# Patient Record
Sex: FEMALE | Race: WHITE | NOT HISPANIC OR LATINO | Employment: FULL TIME | URBAN - METROPOLITAN AREA
[De-identification: names, ages, dates, MRNs, and addresses within clinical notes are randomized per-mention and may not be internally consistent; named-entity substitution may affect disease eponyms.]

---

## 2017-04-12 ENCOUNTER — OFFICE VISIT (OUTPATIENT)
Dept: FAMILY MEDICINE | Facility: CLINIC | Age: 62
End: 2017-04-12
Payer: COMMERCIAL

## 2017-04-12 VITALS
OXYGEN SATURATION: 100 % | TEMPERATURE: 98 F | HEART RATE: 60 BPM | HEIGHT: 65 IN | SYSTOLIC BLOOD PRESSURE: 128 MMHG | WEIGHT: 154 LBS | DIASTOLIC BLOOD PRESSURE: 76 MMHG | BODY MASS INDEX: 25.66 KG/M2

## 2017-04-12 DIAGNOSIS — Z71.1 CONCERN ABOUT CARDIOVASCULAR DISEASE WITHOUT DIAGNOSIS: Primary | ICD-10-CM

## 2017-04-12 PROCEDURE — 99212 OFFICE O/P EST SF 10 MIN: CPT | Performed by: NURSE PRACTITIONER

## 2017-04-12 RX ORDER — CHOLECALCIFEROL (VITAMIN D3) 50 MCG
2000 TABLET ORAL DAILY
COMMUNITY
End: 2017-12-20

## 2017-04-12 NOTE — PROGRESS NOTES
HPI    SUBJECTIVE:                                                    Neli Gregorio is a 61 year old female who presents to clinic today for the following health issues:      Chief Complaint   Patient presents with     Hypertension     blood pressure at last OBGYN visit was 132/86, wanting to make sure this does not need corrective action       BP slightly elevated around 126/80 when [facility] checks [facility]  She is concerned as she has always been in the 1-teens.   She has had a couple fleeting episodes of a hazy brain feeling but those have not been severe at all   No other symptoms     Past Medical History:   Diagnosis Date     Abdominal pain      Atrophic vaginitis      Decreased libido 2015    trial of testosterone cream. Not interested in IM.     Hypercholesteraemia 2010     Hyperlipidemia     PATIENT STARTED LOVASTATIN 12/09     Insomnia, unspecified     Takes 1/4 tab of Ambien 5mg nightly.      Proctitis      Psoriasis     Dx originally by derm, mild disease. TCM prn.     Past Surgical History:   Procedure Laterality Date     MAMMOPLASTY AUGMENTATION  2007    Breast lift     Social History   Substance Use Topics     Smoking status: Former Smoker     Quit date: 1/1/1985     Smokeless tobacco: Never Used     Alcohol use 0.0 oz/week     0 Standard drinks or equivalent per week      Comment: 1/day     Current Outpatient Prescriptions   Medication Sig Dispense Refill     Cholecalciferol (VITAMIN D) 2000 UNITS tablet Take 2,000 Units by mouth daily       atorvastatin (LIPITOR) 40 MG tablet Take 1 tablet (40 mg) by mouth daily 90 tablet 3     desonide (DESOWEN) 0.05 % cream Apply sparingly to vulva area two times daily for 14 days. 15 g 1     EPINEPHrine (EPIPEN) 0.3 MG/0.3ML injection Inject 0.3 mLs (0.3 mg) into the muscle once as needed for anaphylaxis       Allergies   Allergen Reactions     Cats Other (See Comments)     scratchy throat         Reviewed PMH, med list and allergies.      ROS  Detailed as above  "      /76 (BP Location: Right arm, Patient Position: Chair, Cuff Size: Adult Regular)  Pulse 60  Temp 98  F (36.7  C) (Oral)  Ht 5' 5\" (1.651 m)  Wt 154 lb (69.9 kg)  SpO2 100%  Breastfeeding? No  BMI 25.63 kg/m2    Physical Exam   Constitutional: She is well-developed, well-nourished, and in no distress.   Neurological: She is alert.   Psychiatric: Mood and affect normal.   Vitals reviewed.      Assessment and Plan:       ICD-10-CM    1. Concern about cardiovascular disease without diagnosis Z71.1        Concern about BP recently that has been minimally elevated   Normal BP today  Recommended she continue with healthy diet and exercise   Continue following up appropriately     The total visit time was 10 minutes more  than 50% was spent in counseling and coordination of care as discussed above.      LETICIA Meier, CNP  [facility]        "

## 2017-04-12 NOTE — MR AVS SNAPSHOT
"              After Visit Summary   4/12/2017    Neli Gregorio    MRN: [id number]           Patient Information     Date Of Birth          1955        Visit Information        Provider Department      4/12/2017 8:00 AM Nilesh Mckeon APRN CNP [facility]        Today's Diagnoses     Concern about cardiovascular disease without diagnosis    -  1       Follow-ups after your visit        Who to contact     If you have questions or need follow up information about today's clinic visit or your schedule please contact [facility] directly at [phone].  Normal or non-critical lab and imaging results will be communicated to you by W[employer] Labshart, letter or phone within 4 business days after the clinic has received the results. If you do not hear from us within 7 days, please contact the clinic through [employer] or phone. If you have a critical or abnormal lab result, we will notify you by phone as soon as possible.  Submit refill requests through SEDLine or call your pharmacy and they will forward the refill request to us. Please allow 3 business days for your refill to be completed.          Additional Information About Your Visit        MyChart Information     SEDLine gives you secure access to your electronic health record. If you see a primary care provider, you can also send messages to your care team and make appointments. If you have questions, please call your primary care clinic.  If you do not have a primary care provider, please call [phone] and they will assist you.        Care EveryWhere ID     This is your Care EveryWhere ID. This could be used by other organizations to access your [place] medical records  [id number]        Your Vitals Were     Pulse Temperature Height Pulse Oximetry Breastfeeding? BMI (Body Mass Index)    60 98  F (36.7  C) (Oral) 5' 5\" (1.651 m) 100% No 25.63 kg/m2       Blood Pressure from Last 3 Encounters:   04/12/17 128/76   12/14/16 132/86 "   02/17/16 118/70    Weight from Last 3 Encounters:   04/12/17 154 lb (69.9 kg)   12/14/16 155 lb (70.3 kg)   02/17/16 148 lb (67.1 kg)              Today, you had the following     No orders found for display         Today's Medication Changes          These changes are accurate as of: 4/12/17  8:36 AM.  If you have any questions, ask your nurse or doctor.               Stop taking these medicines if you haven't already. Please contact your care team if you have questions.     calcium carb 1250 mg (500 mg [place])/vitamin D 200 units 500-200 MG-UNIT per tablet   Commonly known as:  OSCAL with D   Stopped by:  Nilesh Mckeon APRN CNP                    Primary Care Provider Office Phone # Fax #    Guru Bradley Mccord [phone]       [facility] [address]  [facility] [address]        Thank you!     Thank you for choosing [facility]  for your care. Our goal is always to provide you with excellent care. Hearing back from our patients is one way we can continue to improve our services. Please take a few minutes to complete the written survey that you may receive in the mail after your visit with us. Thank you!             Your Updated Medication List - Protect others around you: Learn how to safely use, store and throw away your medicines at www.disposemymeds.org.          This list is accurate as of: 4/12/17  8:36 AM.  Always use your most recent med list.                   Brand Name Dispense Instructions for use    atorvastatin 40 MG tablet    LIPITOR    90 tablet    Take 1 tablet (40 mg) by mouth daily       desonide 0.05 % cream    DESOWEN    15 g    Apply sparingly to vulva area two times daily for 14 days.       EPINEPHrine 0.3 MG/0.3ML injection      Inject 0.3 mLs (0.3 mg) into the muscle once as needed for anaphylaxis       vitamin D 2000 UNITS tablet      Take 2,000 Units by mouth daily

## 2017-04-12 NOTE — NURSING NOTE
"Chief Complaint   Patient presents with     Hypertension     blood pressure at last OBGYN visit was 132/86, wanting to make sure this does not need corrective action       Initial /76 (BP Location: Right arm, Patient Position: Chair, Cuff Size: Adult Regular)  Pulse 60  Temp 98  F (36.7  C) (Oral)  Ht 5' 5\" (1.651 m)  Wt 154 lb (69.9 kg)  SpO2 100%  Breastfeeding? No  BMI 25.63 kg/m2 Estimated body mass index is 25.63 kg/(m^2) as calculated from the following:    Height as of this encounter: 5' 5\" (1.651 m).    Weight as of this encounter: 154 lb (69.9 kg).  Medication Reconciliation: complete     Wolfgang Mcnally CMA     "

## 2017-04-13 DIAGNOSIS — R23.8 SKIN IRRITATION: ICD-10-CM

## 2017-04-13 RX ORDER — DESONIDE 0.5 MG/G
CREAM TOPICAL
Qty: 15 G | Refills: 1 | Status: SHIPPED | OUTPATIENT
Start: 2017-04-13 | End: 2017-12-20

## 2017-04-13 NOTE — TELEPHONE ENCOUNTER
desonide (DESOWEN) 0.05 % cream   Last Written Prescription Date:  5/3/16  Last Fill Quantity: 15 gm,   # refills: 1  Last Office Visit with [facility] primary care provider:  12/14/16  Future Office visit: none    Routing refill request to provider for review/approval because:  Refill sent per [place] Protocol.

## 2017-12-20 ENCOUNTER — RADIANT APPOINTMENT (OUTPATIENT)
Dept: MAMMOGRAPHY | Facility: CLINIC | Age: 62
End: 2017-12-20
Payer: COMMERCIAL

## 2017-12-20 ENCOUNTER — OFFICE VISIT (OUTPATIENT)
Dept: OBGYN | Facility: CLINIC | Age: 62
End: 2017-12-20
Payer: COMMERCIAL

## 2017-12-20 VITALS
SYSTOLIC BLOOD PRESSURE: 108 MMHG | WEIGHT: 147 LBS | BODY MASS INDEX: 24.49 KG/M2 | HEIGHT: 65 IN | DIASTOLIC BLOOD PRESSURE: 66 MMHG | HEART RATE: 68 BPM

## 2017-12-20 DIAGNOSIS — E78.5 HYPERLIPIDEMIA WITH TARGET LDL LESS THAN 100: ICD-10-CM

## 2017-12-20 DIAGNOSIS — E78.00 HYPERCHOLESTEROLEMIA: ICD-10-CM

## 2017-12-20 DIAGNOSIS — Z13.21 ENCOUNTER FOR VITAMIN DEFICIENCY SCREENING: ICD-10-CM

## 2017-12-20 DIAGNOSIS — Z12.31 VISIT FOR SCREENING MAMMOGRAM: ICD-10-CM

## 2017-12-20 DIAGNOSIS — R23.8 SKIN IRRITATION: ICD-10-CM

## 2017-12-20 DIAGNOSIS — Z01.419 ENCOUNTER FOR GYNECOLOGICAL EXAMINATION WITHOUT ABNORMAL FINDING: Primary | ICD-10-CM

## 2017-12-20 PROCEDURE — 82306 VITAMIN D 25 HYDROXY: CPT | Performed by: OBSTETRICS & GYNECOLOGY

## 2017-12-20 PROCEDURE — 99396 PREV VISIT EST AGE 40-64: CPT | Performed by: OBSTETRICS & GYNECOLOGY

## 2017-12-20 PROCEDURE — 80061 LIPID PANEL: CPT | Performed by: OBSTETRICS & GYNECOLOGY

## 2017-12-20 PROCEDURE — 80053 COMPREHEN METABOLIC PANEL: CPT | Performed by: OBSTETRICS & GYNECOLOGY

## 2017-12-20 PROCEDURE — 36415 COLL VENOUS BLD VENIPUNCTURE: CPT | Performed by: OBSTETRICS & GYNECOLOGY

## 2017-12-20 PROCEDURE — G0202 SCR MAMMO BI INCL CAD: HCPCS | Mod: TC

## 2017-12-20 RX ORDER — ATORVASTATIN CALCIUM 40 MG/1
40 TABLET, FILM COATED ORAL DAILY
Qty: 90 TABLET | Refills: 3 | Status: SHIPPED | OUTPATIENT
Start: 2017-12-20 | End: 2019-02-20

## 2017-12-20 RX ORDER — DESONIDE 0.5 MG/G
CREAM TOPICAL
Qty: 15 G | Refills: 3 | Status: SHIPPED | OUTPATIENT
Start: 2017-12-20 | End: 2019-07-19

## 2017-12-20 ASSESSMENT — ANXIETY QUESTIONNAIRES
1. FEELING NERVOUS, ANXIOUS, OR ON EDGE: NOT AT ALL
2. NOT BEING ABLE TO STOP OR CONTROL WORRYING: NOT AT ALL
6. BECOMING EASILY ANNOYED OR IRRITABLE: NOT AT ALL
IF YOU CHECKED OFF ANY PROBLEMS ON THIS QUESTIONNAIRE, HOW DIFFICULT HAVE THESE PROBLEMS MADE IT FOR YOU TO DO YOUR WORK, TAKE CARE OF THINGS AT HOME, OR GET ALONG WITH OTHER PEOPLE: NOT DIFFICULT AT ALL
3. WORRYING TOO MUCH ABOUT DIFFERENT THINGS: NOT AT ALL
GAD7 TOTAL SCORE: 1
7. FEELING AFRAID AS IF SOMETHING AWFUL MIGHT HAPPEN: NOT AT ALL
5. BEING SO RESTLESS THAT IT IS HARD TO SIT STILL: NOT AT ALL

## 2017-12-20 ASSESSMENT — PATIENT HEALTH QUESTIONNAIRE - PHQ9
5. POOR APPETITE OR OVEREATING: SEVERAL DAYS
SUM OF ALL RESPONSES TO PHQ QUESTIONS 1-9: 0

## 2017-12-20 NOTE — MR AVS SNAPSHOT
"              After Visit Summary   12/20/2017    Neli Gregorio    MRN: [id number]           Patient Information     Date Of Birth          1955        Visit Information        Provider Department      12/20/2017 8:20 AM Nuvia Riley MD [facility]        Today's Diagnoses     Encounter for gynecological examination without abnormal finding    -  1    Hypercholesterolemia        Skin irritation        Hyperlipidemia with target LDL less than 100        Encounter for vitamin deficiency screening           Follow-ups after your visit        Who to contact     If you have questions or need follow up information about today's clinic visit or your schedule please contact [facility] directly at [phone].  Normal or non-critical lab and imaging results will be communicated to you by Mirror Digitalhart, letter or phone within 4 business days after the clinic has received the results. If you do not hear from us within 7 days, please contact the clinic through Mirror Digitalhart or phone. If you have a critical or abnormal lab result, we will notify you by phone as soon as possible.  Submit refill requests through AdsNative or call your pharmacy and they will forward the refill request to us. Please allow 3 business days for your refill to be completed.          Additional Information About Your Visit        MyChart Information     A[employer] gives you secure access to your electronic health record. If you see a primary care provider, you can also send messages to your care team and make appointments. If you have questions, please call your primary care clinic.  If you do not have a primary care provider, please call [phone] and they will assist you.        Care EveryWhere ID     This is your Care EveryWhere ID. This could be used by other organizations to access your [place] medical records  [id number]        Your Vitals Were     Pulse Height BMI (Body Mass Index)             68 5' 5\" " (1.651 m) 24.46 kg/m2          Blood Pressure from Last 3 Encounters:   12/20/17 108/66   04/12/17 128/76   12/14/16 132/86    Weight from Last 3 Encounters:   12/20/17 147 lb (66.7 kg)   04/12/17 154 lb (69.9 kg)   12/14/16 155 lb (70.3 kg)              We Performed the Following     Comprehensive metabolic panel     Lipid panel reflex to direct LDL Fasting     Vitamin D Deficiency          Where to get your medicines      These medications were sent to [id number] - [place], [place] - [address]  [address], [place] [address]     Phone:  [phone]     atorvastatin 40 MG tablet    desonide 0.05 % cream          Primary Care Provider Office Phone # Fax #    Guru Mccord [phone] [address] [facility] [address]  [facility] [address]        Equal Access to Services     BISMARK CATES AH: Hadii star ku hadasho Soomaali, waaxda luqadaha, qaybta kaalmada adeegyada, med cross . So [facility] [phone].    ATENCIÓN: Si samirala español, tiene a bo disposición servicios gratuitos de asistencia lingüística. Llame al [phone].    We comply with applicable federal civil rights laws and [place] laws. We do not discriminate on the basis of race, color, national origin, age, disability, sex, sexual orientation, or gender identity.            Thank you!     Thank you for choosing [facility] [place] [place]  for your care. Our goal is always to provide you with excellent care. Hearing back from our patients is one way we can continue to improve our services. Please take a few minutes to complete the written survey that you may receive in the mail after your visit with us. Thank you!             Your Updated Medication List - Protect others around you: Learn how to safely use, store and throw away your medicines at www.disposemymeds.org.          This list is accurate as of: 12/20/17  9:13 AM.  Always use your most recent med list.                    Brand Name Dispense Instructions for use Diagnosis    atorvastatin 40 MG tablet    LIPITOR    90 tablet    Take 1 tablet (40 mg) by mouth daily    Hypercholesterolemia       desonide 0.05 % cream    DESOWEN    15 g    Apply sparingly to vulva area two times daily for 14 days.    Skin irritation       EPINEPHrine 0.3 MG/0.3ML injection 2-pack    EPIPEN/ADRENACLICK/or ANY BX GENERIC EQUIV     Inject 0.3 mLs (0.3 mg) into the muscle once as needed for anaphylaxis

## 2017-12-20 NOTE — PROGRESS NOTES
Neli is a 62 year old  female who presents for annual exam.     Besides routine health maintenance, she has no other health concerns today .    HPI:  The patient's PCP is Guru Mccord MD.    Prior breast reduction  Some rashes in creases occ  Wants labs today  Not taking vit D      GYNECOLOGIC HISTORY:    No LMP recorded. Patient is postmenopausal.  Her current contraception method is: menopause.  She  reports that she quit smoking about 32 years ago. She has never used smokeless tobacco.      Patient is sexually active.  STD testing offered?  Declined  Last PHQ-9 score on record = PHQ-9 SCORE 2017   Total Score 0     Last GAD7 score on record =   GRIFFIN-7 SCORE 2017   Total Score 1     Alcohol Score = 5    HEALTH MAINTENANCE:  Cholesterol: 16   Total= 179, Triglycerides=65, HDL=65, ASM=717, FBS=84  Last Mammo: one year ago, Result: normal, Next Mammo: today   Pap: 01/15/13 wnl, HPV-  Colonoscopy:  07/15/08, Result: normal, Next Colonoscopy: 1 year.  Dexa:  13 normal    Health maintenance updated:  yes    HISTORY:  Obstetric History       T1      L1     SAB0   TAB1   Ectopic0   Multiple0   Live Births1       # Outcome Date GA Lbr Delonte/2nd Weight Sex Delivery Anes PTL Lv   2 Term 85 39w0d  7 lb 6 oz (3.345 kg) M Vag-Vacuum   MAX   1 TAB                   Patient Active Problem List   Diagnosis     Hyperlipidemia with target LDL less than 100     Past Surgical History:   Procedure Laterality Date     MAMMOPLASTY AUGMENTATION  2007    Breast lift      Social History   Substance Use Topics     Smoking status: Former Smoker     Quit date: 1985     Smokeless tobacco: Never Used     Alcohol use 0.0 oz/week     0 Standard drinks or equivalent per week      Comment: 1/day      Problem (# of Occurrences) Relation (Name,Age of Onset)    Alzheimer Disease (1) Mother    HEART DISEASE (1) Father    Hyperlipidemia (2) Father, Mother    Hypertension (1) Father    OSTEOPOROSIS  "(2) Other: aunt, Maternal Grandmother            Current Outpatient Prescriptions   Medication Sig     desonide (DESOWEN) 0.05 % cream Apply sparingly to vulva area two times daily for 14 days.     atorvastatin (LIPITOR) 40 MG tablet Take 1 tablet (40 mg) by mouth daily     EPINEPHrine (EPIPEN) 0.3 MG/0.3ML injection Inject 0.3 mLs (0.3 mg) into the muscle once as needed for anaphylaxis     No current facility-administered medications for this visit.      Allergies   Allergen Reactions     Cats Other (See Comments)     scratchy throat         Past medical, surgical, social and family histories were reviewed and updated in EPIC.    ROS:   12 point review of systems negative other than symptoms noted below.  Breast: Tenderness  Respiratory: Cough  Genitourinary: No Periods and Vaginal Dryness  Skin: Rash  Endocrine: Decreased Libido    EXAM:  /66  Pulse 68  Ht 5' 5\" (1.651 m)  Wt 147 lb (66.7 kg)  BMI 24.46 kg/m2   BMI: Body mass index is 24.46 kg/(m^2).    PHYSICAL EXAM:  Constitutional:  Appearance: Well nourished, well developed, alert, in no acute distress  Neck:  Lymph Nodes:  No lymphadenopathy present    Thyroid:  Gland size normal, nontender, no nodules or masses present  on palpation  Chest:  Respiratory Effort:  Breathing unlabored  Cardiovascular:    Heart: Auscultation:  Regular rate, normal rhythm, no murmurs present  Breasts: Inspection of Breasts:  No lymphadenopathy present., Palpation of Breasts and Axillae:  No masses present on palpation, no breast tenderness., Axillary Lymph Nodes:  No lymphadenopathy present. and No nodularity, asymmetry or nipple discharge bilaterally.  Gastrointestinal:   Abdominal Examination:  Abdomen nontender to palpation, tone normal without rigidity or guarding, no masses present, umbilicus without lesions   Liver and Spleen:  No hepatomegaly present, liver nontender to palpation    Hernias:  No hernias present  Lymphatic: Lymph Nodes:  No other lymphadenopathy " present  Skin:  General Inspection:  No rashes present, no lesions present, no areas of  discoloration    Genitalia and Groin:  No rashes present, no lesions present, no areas of  discoloration, no masses present  Neurologic/Psychiatric:    Mental Status:  Oriented X3     Pelvic Exam:  External Genitalia:     Normal appearance for age, no discharge present, no tenderness present, no inflammatory lesions present, color normal  Vagina:     Normal vaginal vault without central or paravaginal defects, no discharge present, no inflammatory lesions present, no masses present  Bladder:     Nontender to palpation  Urethra:   Urethral Body:  Urethra palpation normal, urethra structural support normal   Urethral Meatus:  No erythema or lesions present  Cervix:     Appearance healthy, no lesions present, nontender to palpation, no bleeding present  Uterus:     Uterus: firm, normal sized and nontender, anteverted in position.   Adnexa:     No adnexal tenderness present, no adnexal masses present  Perineum:     Perineum within normal limits, no evidence of trauma, no rashes or skin lesions present  Anus:     Anus within normal limits, no hemorrhoids present  Inguinal Lymph Nodes:     No lymphadenopathy present  Pubic Hair:     Normal pubic hair distribution for age  Genitalia and Groin:     No rashes present, no lesions present, no areas of discoloration, no masses present      COUNSELING:   Reviewed preventive health counseling, as reflected in patient instructions       Regular exercise       Healthy diet/nutrition    BMI: Body mass index is 24.46 kg/(m^2).        ASSESSMENT:  62 year old female with satisfactory annual exam.  ICD-10-CM    1. Encounter for gynecological examination without abnormal finding Z01.419        PLAN:  Return 1 yr    Nuvia Riley MD

## 2017-12-21 LAB
ALBUMIN SERPL-MCNC: 4.2 G/DL (ref 3.4–5)
ALP SERPL-CCNC: 53 U/L (ref 40–150)
ALT SERPL W P-5'-P-CCNC: 26 U/L (ref 0–50)
ANION GAP SERPL CALCULATED.3IONS-SCNC: 6 MMOL/L (ref 3–14)
AST SERPL W P-5'-P-CCNC: 18 U/L (ref 0–45)
BILIRUB SERPL-MCNC: 0.6 MG/DL (ref 0.2–1.3)
BUN SERPL-MCNC: 17 MG/DL (ref 7–30)
CALCIUM SERPL-MCNC: 8.9 MG/DL (ref 8.5–10.1)
CHLORIDE SERPL-SCNC: 105 MMOL/L (ref 94–109)
CHOLEST SERPL-MCNC: 172 MG/DL
CO2 SERPL-SCNC: 28 MMOL/L (ref 20–32)
CREAT SERPL-MCNC: 0.64 MG/DL (ref 0.52–1.04)
DEPRECATED CALCIDIOL+CALCIFEROL SERPL-MC: 28 UG/L (ref 20–75)
GFR SERPL CREATININE-BSD FRML MDRD: >90 ML/MIN/1.7M2
GLUCOSE SERPL-MCNC: 81 MG/DL (ref 70–99)
HDLC SERPL-MCNC: 76 MG/DL
LDLC SERPL CALC-MCNC: 86 MG/DL
NONHDLC SERPL-MCNC: 96 MG/DL
POTASSIUM SERPL-SCNC: 3.9 MMOL/L (ref 3.4–5.3)
PROT SERPL-MCNC: 6.7 G/DL (ref 6.8–8.8)
SODIUM SERPL-SCNC: 139 MMOL/L (ref 133–144)
TRIGL SERPL-MCNC: 49 MG/DL

## 2017-12-21 ASSESSMENT — ANXIETY QUESTIONNAIRES: GAD7 TOTAL SCORE: 1

## 2017-12-22 ENCOUNTER — MYC MEDICAL ADVICE (OUTPATIENT)
Dept: OBGYN | Facility: CLINIC | Age: 62
End: 2017-12-22

## 2017-12-23 NOTE — TELEPHONE ENCOUNTER
The lipids are entirely normal.  We don't treat for total cholesterol numbers.  The normal value for total cholesterol is < 200 so to answer your question, 172 is completely normal, not extremely high as you were thinking.   The normal ranges show on the report you can see in My chart.    The LDL is way less than 100 which is excellent and well below goal.  That is the most important number to follow.    There is no need to adjust the medication dose.

## 2018-03-06 ENCOUNTER — OFFICE VISIT (OUTPATIENT)
Dept: FAMILY MEDICINE | Facility: CLINIC | Age: 63
End: 2018-03-06
Payer: COMMERCIAL

## 2018-03-06 VITALS
SYSTOLIC BLOOD PRESSURE: 110 MMHG | WEIGHT: 149 LBS | HEIGHT: 65 IN | BODY MASS INDEX: 24.83 KG/M2 | TEMPERATURE: 97.1 F | OXYGEN SATURATION: 98 % | DIASTOLIC BLOOD PRESSURE: 75 MMHG | HEART RATE: 70 BPM

## 2018-03-06 DIAGNOSIS — M25.511 RIGHT SHOULDER PAIN, UNSPECIFIED CHRONICITY: ICD-10-CM

## 2018-03-06 DIAGNOSIS — M54.6 BILATERAL THORACIC BACK PAIN, UNSPECIFIED CHRONICITY: Primary | ICD-10-CM

## 2018-03-06 PROCEDURE — 99213 OFFICE O/P EST LOW 20 MIN: CPT | Performed by: NURSE PRACTITIONER

## 2018-03-06 NOTE — MR AVS SNAPSHOT
After Visit Summary   3/6/2018    Neli Gregorio    MRN: [id number]           Patient Information     Date Of Birth          1955        Visit Information        Provider Department      3/6/2018 4:00 PM Nilesh Mckeon APRN [facility]        Today's Diagnoses     Bilateral thoracic back pain, unspecified chronicity    -  1    Right shoulder pain, unspecified chronicity          Care Instructions    Take Tylenol, Ibuprofen or Aleve for pain.   Ibuprofen and Aleve are both antiinflammatories so you should never take these together during the same 24 hour period.  If you take a high dose of Ibuprofen, do not take it consistently for more than 5 days   Tylenol only helps with pain and does not help with inflammation. Tylenol is the safest option if you have kidney or heart history.  You have to take at least 600mg of ibuprofen to get the antiinflammatory affect. 200-400mg of Ibuprofen will only help with pain.  It is ok to take Tylenol with Ibuprofen or Aleve  Do not take more than:  Tylenol (acetaminophen)  1000 mg 3 times a day  Ibuprofen (Advil/Motrin) 600-800 mg 3-4 times a day WITH FOOD  Aleve 220mg 2 pills twice a day WITH FOOD            Follow-ups after your visit        Additional Services     [facility] PT, HAND, AND CHIROPRACTIC REFERRAL       **This order will print in the [facility] Scheduling Office**    Physical Therapy, Hand Therapy and Chiropractic Care are available through:    *[facility]  *[facility]  *[facility]    Call one number to schedule at any of the above locations: [phone].    Your provider has referred you to: [facility] - PT and/or Chiropractic Care determined by clinical presentation at [facility] or [facility] Initial Visit    Indication/Reason for Referral: Shoulder Pain and Thoracic Pain  Onset of Illness: 6 weeks  Therapy Orders: Evaluate and Treat  Special Programs: None  Special Request:  "None    Hanna Ferreira      Additional Comments for the Therapist or Chiropractor:     Please be aware that coverage of these services is subject to the terms and limitations of your health insurance plan.  Call member services at your health plan with any benefit or coverage questions.      Please bring the following to your appointment:    *Your personal calendar for scheduling future appointments  *Comfortable clothing                  Who to contact     If you have questions or need follow up information about today's clinic visit or your schedule please contact [facility] directly at [phone].  Normal or non-critical lab and imaging results will be communicated to you by Sidensehart, letter or phone within 4 business days after the clinic has received the results. If you do not hear from us within 7 days, please contact the clinic through [employer] or phone. If you have a critical or abnormal lab result, we will notify you by phone as soon as possible.  Submit refill requests through [email or web address] or call your pharmacy and they will forward the refill request to us. Please allow 3 business days for your refill to be completed.          Additional Information About Your Visit        Sidensehart Information     [email or web address] gives you secure access to your electronic health record. If you see a primary care provider, you can also send messages to your care team and make appointments. If you have questions, please call your primary care clinic.  If you do not have a primary care provider, please call [phone] and they will assist you.        Care EveryWhere ID     This is your Care EveryWhere ID. This could be used by other organizations to access your [facility] medical records  [id number]        Your Vitals Were     Pulse Temperature Height Pulse Oximetry Breastfeeding? BMI (Body Mass Index)    70 97.1  F (36.2  C) (Oral) 5' 5\" (1.651 m) 98% No 24.79 kg/m2       Blood Pressure from Last 3 Encounters:   03/06/18 " 110/75   12/20/17 108/66   04/12/17 128/76    Weight from Last 3 Encounters:   03/06/18 149 lb (67.6 kg)   12/20/17 147 lb (66.7 kg)   04/12/17 154 lb (69.9 kg)              We Performed the Following     [facility] PT, HAND, AND CHIROPRACTIC REFERRAL          Today's Medication Changes          These changes are accurate as of 3/6/18  4:31 PM.  If you have any questions, ask your nurse or doctor.               These medicines have changed or have updated prescriptions.        Dose/Directions    atorvastatin 40 MG tablet   Commonly known as:  LIPITOR   This may have changed:  how much to take   Used for:  Hypercholesterolemia        Dose:  40 mg   Take 1 tablet (40 mg) by mouth daily   Quantity:  90 tablet   Refills:  3                Primary Care Provider Office Phone # Fax #    Guru Mccord [phone] [address] [address]        Equal Access to Services     ELOINA CATES AH: Hadii star ku hadasho Soomaali, waaxda luqadaha, qaybta kaalmada adeegyada, waxay kandis haygarcia cross . So [facility] [phone].    ATENCIÓN: Si habla español, tiene a bo disposición servicios gratuitos de asistencia lingüística. Llame al [phone].    We comply with applicable federal civil rights laws and [place] laws. We do not discriminate on the basis of race, color, national origin, age, disability, sex, sexual orientation, or gender identity.            Thank you!     Thank you for choosing [facility]  for your care. Our goal is always to provide you with excellent care. Hearing back from our patients is one way we can continue to improve our services. Please take a few minutes to complete the written survey that you may receive in the mail after your visit with us. Thank you!             Your Updated Medication List - Protect others around you: Learn how to safely use, store and throw away your medicines at www.disposemymeds.org.          This list is accurate as of  3/6/18  4:31 PM.  Always use your most recent med list.                   Brand Name Dispense Instructions for use Diagnosis    atorvastatin 40 MG tablet    LIPITOR    90 tablet    Take 1 tablet (40 mg) by mouth daily    Hypercholesterolemia       desonide 0.05 % cream    DESOWEN    15 g    Apply sparingly to vulva area two times daily for 14 days.    Skin irritation       EPINEPHrine 0.3 MG/0.3ML injection 2-pack    EPIPEN/ADRENACLICK/or ANY BX GENERIC EQUIV     Inject 0.3 mLs (0.3 mg) into the muscle once as needed for anaphylaxis

## 2018-03-06 NOTE — PROGRESS NOTES
HPI      SUBJECTIVE:   Neli Gregorio is a 62 year old female who presents to clinic today for the following health issues:    Chief Complaint   Patient presents with     Back Pain     mid to right side back pain 6 weeks & right shoulder nagging pain. No known injury, no cough, no rash        Mid back pain off and on for about 6 week. More on right than left    Also right shoulder pain   In [place] last week. Diarrhea 1 day that is resolved   Had a cold at the end of December and again at the end of Jan but never had a terrible cough   No SOB  Always has pain of right breast but other no CP   Some morning the pain is worse, but nothing specific brings on the pain   Advil resolves the pain but doesn't want to take the daily   Certain positions do help and also massage helps   No paresthesias   Shoulder pain can go a little into the deltoid   Minimal chronic neck pain       Past Medical History:   Diagnosis Date     Abdominal pain      Atrophic vaginitis      Decreased libido 2015    trial of testosterone cream. Not interested in IM.     Hypercholesteraemia 2010     Hyperlipidemia     PATIENT STARTED LOVASTATIN 12/09     Insomnia, unspecified     Takes 1/4 tab of Ambien 5mg nightly.      Proctitis      Psoriasis     Dx originally by derm, mild disease. TCM prn.     Past Surgical History:   Procedure Laterality Date     MAMMOPLASTY AUGMENTATION  2007    Breast lift     Social History   Substance Use Topics     Smoking status: Former Smoker     Quit date: 1/1/1985     Smokeless tobacco: Never Used     Alcohol use 0.0 oz/week     0 Standard drinks or equivalent per week      Comment: 1/day     Current Outpatient Prescriptions   Medication Sig Dispense Refill     atorvastatin (LIPITOR) 40 MG tablet Take 1 tablet (40 mg) by mouth daily (Patient taking differently: Take 20 mg by mouth daily ) 90 tablet 3     desonide (DESOWEN) 0.05 % cream Apply sparingly to vulva area two times daily for 14 days. 15 g 3     EPINEPHrine  "(EPIPEN) 0.3 MG/0.3ML injection Inject 0.3 mLs (0.3 mg) into the muscle once as needed for anaphylaxis       Allergies   Allergen Reactions     Cats Other (See Comments)     scratchy throat         Reviewed and updated as needed this visit by clinical staff and provider    ROS  Detailed as above       /75 (BP Location: Right arm, Cuff Size: Adult Regular)  Pulse 70  Temp 97.1  F (36.2  C) (Oral)  Ht 5' 5\" (1.651 m)  Wt 149 lb (67.6 kg)  SpO2 98%  Breastfeeding? No  BMI 24.79 kg/m2      Physical Exam   Constitutional: She is well-developed, well-nourished, and in no distress.   HENT:   Head: Normocephalic.   Eyes: Conjunctivae are normal.   Neck: Normal range of motion.   Cardiovascular: Normal rate, regular rhythm and normal heart sounds.    No murmur heard.  Pulmonary/Chest: Effort normal and breath sounds normal.   Musculoskeletal: Normal range of motion. She exhibits no tenderness.   Notable asymmetry of bilateral upper arm musculature as well as bilateral upper back.   Neurological: She is alert.   Psychiatric: Mood and affect normal.   Vitals reviewed.        Assessment and Plan:       ICD-10-CM    1. Bilateral thoracic back pain, unspecified chronicity M54.6 [facility] PT, HAND, AND CHIROPRACTIC REFERRAL   2. Right shoulder pain, unspecified chronicity M25.511 [facility] PT, HAND, AND CHIROPRACTIC REFERRAL       Persistent upper back pain with notable asymmetry of musculature.  Will send to PT for eval and treatment as this is clearly muscular.  She can take Tylenol or ibuprofen as needed for pain.  Call or return to clinic as needed      LETICIA Meier, CNP  [facility]    "

## 2018-03-06 NOTE — NURSING NOTE
"Chief Complaint   Patient presents with     Back Pain     mid to right side back pain 6 weeks & right shoulder nagging pain. No known injury, no cough, no rash        Initial /75 (BP Location: Right arm, Cuff Size: Adult Regular)  Pulse 70  Temp 97.1  F (36.2  C) (Oral)  Ht 5' 5\" (1.651 m)  Wt 149 lb (67.6 kg)  SpO2 98%  Breastfeeding? No  BMI 24.79 kg/m2 Estimated body mass index is 24.79 kg/(m^2) as calculated from the following:    Height as of this encounter: 5' 5\" (1.651 m).    Weight as of this encounter: 149 lb (67.6 kg).  Medication Reconciliation: complete    "

## 2018-03-06 NOTE — PATIENT INSTRUCTIONS
Take Tylenol, Ibuprofen or Aleve for pain.   Ibuprofen and Aleve are both antiinflammatories so you should never take these together during the same 24 hour period.  If you take a high dose of Ibuprofen, do not take it consistently for more than 5 days   Tylenol only helps with pain and does not help with inflammation. Tylenol is the safest option if you have kidney or heart history.  You have to take at least 600mg of ibuprofen to get the antiinflammatory affect. 200-400mg of Ibuprofen will only help with pain.  It is ok to take Tylenol with Ibuprofen or Aleve  Do not take more than:  Tylenol (acetaminophen)  1000 mg 3 times a day  Ibuprofen (Advil/Motrin) 600-800 mg 3-4 times a day WITH FOOD  Aleve 220mg 2 pills twice a day WITH FOOD

## 2018-03-17 ENCOUNTER — HEALTH MAINTENANCE LETTER (OUTPATIENT)
Age: 63
End: 2018-03-17

## 2018-03-21 ENCOUNTER — THERAPY VISIT (OUTPATIENT)
Dept: PHYSICAL THERAPY | Facility: CLINIC | Age: 63
End: 2018-03-21
Payer: COMMERCIAL

## 2018-03-21 DIAGNOSIS — M75.41 IMPINGEMENT SYNDROME OF SHOULDER REGION, RIGHT: Primary | ICD-10-CM

## 2018-03-21 PROCEDURE — 97112 NEUROMUSCULAR REEDUCATION: CPT | Mod: GP

## 2018-03-21 PROCEDURE — 97161 PT EVAL LOW COMPLEX 20 MIN: CPT | Mod: GP

## 2018-03-21 PROCEDURE — 97110 THERAPEUTIC EXERCISES: CPT | Mod: GP

## 2018-03-21 NOTE — PROGRESS NOTES
[facility] Initial Evaluation  Subjective:  Patient is a 62 year old female presenting with rehab right shoulder hpi.   Neli Grgeorio is a 62 year old female with a right shoulder condition.  Condition occurred with:  Unknown cause.  Condition occurred: for unknown reasons.  This is a new condition  Onset: 4-6 weeks ago, thinks it might be due to lifting at gym; .    Patient reports pain:  In the joint.    Pain is described as sharp and is intermittent and reported as 5/10.   Pain is the same all the time.  Symptoms are exacerbated by lying on extremity and certain positions Relieved by: avoidance   Pain course: a little worse since onset.                                                      Objective:  Standing Alignment:      Shoulder/UE:  Rounded shoulders                                       Shoulder Evaluation:  ROM:  AROM:    Flexion:  Right:  160    Abduction:  Right:  150    Internal Rotation:  Right:  35  External Rotation:  Right:  95                      Strength:  : R RTC: grossly 4+/5 all motions, painful with scaption, ER.                        Special Tests:      Right shoulder positive for the following special tests:Impingement  Right shoulder negative for the following special tests:Rotator cuff tear                                       General     ROS    Assessment/Plan:    Patient is a 62 year old female with right side shoulder complaints.    Patient has the following significant findings with corresponding treatment plan.                Diagnosis 1:  R shoulder pain, s&sx consistent with impingement   Decreased strength - therapeutic exercise and therapeutic activities  Impaired muscle performance - neuro re-education  Decreased function - therapeutic activities    Therapy Evaluation Codes:   1) History comprised of:   Personal factors that impact the plan of care:      None.    Comorbidity factors that impact the plan of care are:      None.     Medications impacting  care: None.  2) Examination of Body Systems comprised of:   Body structures and functions that impact the plan of care:      Shoulder.   Activity limitations that impact the plan of care are:      reaching .  3) Clinical presentation characteristics are:   Stable/Uncomplicated.  4) Decision-Making    Low complexity using standardized patient assessment instrument and/or measureable assessment of functional outcome.  Cumulative Therapy Evaluation is: Low complexity.    Previous and current functional limitations:  (See Goal Flow Sheet for this information)    Short term and Long term goals: (See Goal Flow Sheet for this information)     Communication ability:  Patient appears to be able to clearly communicate and understand verbal and written communication and follow directions correctly.  Treatment Explanation - The following has been discussed with the patient:   RX ordered/plan of care  Anticipated outcomes  Possible risks and side effects  This patient would benefit from PT intervention to resume normal activities.   Rehab potential is good.    Frequency:  1 X week, once daily  Duration:  for 6 weeks  Discharge Plan:  Achieve all LTG.  Independent in home treatment program.  Reach maximal therapeutic benefit.    Please refer to the daily flowsheet for treatment today, total treatment time and time spent performing 1:1 timed codes.

## 2018-03-21 NOTE — MR AVS SNAPSHOT
After Visit Summary   3/21/2018    Neli Gregorio    MRN: [id number]           Patient Information     Date Of Birth          1955        Visit Information        Provider Department      3/21/2018 4:50 PM Marc Quiroga, PT [facility]        Today's Diagnoses     Impingement syndrome of shoulder region, right    -  1       Follow-ups after your visit        Your next 10 appointments already scheduled     Apr 02, 2018  5:30 PM CDT   [facility] Spine with Marc Quiroga PT   [facility] ([facility] [place])    [address] [place] [address]  [address]  [place] [address]   [phone]            Apr 11, 2018  4:50 PM CDT   [facility] Spine with Marc Quiroga PT   [facility] ([facility] [place])    [address] [place] [address]  [address]  [place] [address]   [phone]              Who to contact     If you have questions or need follow up information about today's clinic visit or your schedule please contact [facility] directly at [phone].  Normal or non-critical lab and imaging results will be communicated to you by Handmade Mobilehart, letter or phone within 4 business days after the clinic has received the results. If you do not hear from us within 7 days, please contact the clinic through Handmade Mobilehart or phone. If you have a critical or abnormal lab result, we will notify you by phone as soon as possible.  Submit refill requests through [employer] or call your pharmacy and they will forward the refill request to us. Please allow 3 business days for your refill to be completed.          Additional Information About Your Visit        Handmade Mobilehart Information     [employer] gives you secure access to your electronic health record. If you see a primary care provider, you can also send messages to your care team and make  appointments. If you have questions, please call your primary care clinic.  If you do not have a primary care provider, please call [phone] and they will assist you.        Care EveryWhere ID     This is your Care EveryWhere ID. This could be used by other organizations to access your [place] medical records  [id number]         Blood Pressure from Last 3 Encounters:   03/06/18 110/75   12/20/17 108/66   04/12/17 128/76    Weight from Last 3 Encounters:   03/06/18 67.6 kg (149 lb)   12/20/17 66.7 kg (147 lb)   04/12/17 69.9 kg (154 lb)              We Performed the Following     [facility] PT EVAL, LOW COMPLEXITY     [facility] INITIAL EVAL REPORT     NEUROMUSCULAR RE-EDUCATION     THERAPEUTIC EXERCISES          Today's Medication Changes          These changes are accurate as of 3/21/18  5:24 PM.  If you have any questions, ask your nurse or doctor.               These medicines have changed or have updated prescriptions.        Dose/Directions    atorvastatin 40 MG tablet   Commonly known as:  LIPITOR   This may have changed:  how much to take   Used for:  Hypercholesterolemia        Dose:  40 mg   Take 1 tablet (40 mg) by mouth daily   Quantity:  90 tablet   Refills:  3                Primary Care Provider Office Phone # Fax #    Guru Mccord [phone] [address] [address]        Equal Access to Services     ELOINA CATES AH: Hadii star ham hadasho Soomaali, waaxda luqadaha, qaybta kaalmada edvin, med rosario. So [facility] [phone].    ATENCIÓN: Si habla español, tiene a bo disposición servicios gratuitos de asistencia lingüística. Llame al [phone].    We comply with applicable federal civil rights laws and [place] laws. We do not discriminate on the basis of race, color, national origin, age, disability, sex, sexual orientation, or gender identity.            Thank you!     Thank you for choosing [facility]  for your care. Our goal is always to provide you with excellent care. Hearing back from our patients is one way we can continue to improve our services. Please take a few minutes to complete the written survey that you may receive in the mail after your visit with us. Thank you!             Your Updated Medication List - Protect others around you: Learn how to safely use, store and throw away your medicines at www.disposemymeds.org.          This list is accurate as of 3/21/18  5:24 PM.  Always use your most recent med list.                   Brand Name Dispense Instructions for use Diagnosis    atorvastatin 40 MG tablet    LIPITOR    90 tablet    Take 1 tablet (40 mg) by mouth daily    Hypercholesterolemia       desonide 0.05 % cream    DESOWEN    15 g    Apply sparingly to vulva area two times daily for 14 days.    Skin irritation       EPINEPHrine 0.3 MG/0.3ML injection 2-pack    EPIPEN/ADRENACLICK/or ANY BX GENERIC EQUIV     Inject 0.3 mLs (0.3 mg) into the muscle once as needed for anaphylaxis

## 2018-03-23 NOTE — PROGRESS NOTES
[facility] Initial Evaluation  Subjective:  Patient is a 62 year old female presenting with rehab right shoulder hpi.                                      Past medical history: Pain at night/rest some in shoulder.  Medical allergies: no.    Medication history: Atorvostatin-20mgs.  Current occupation is Employee . .    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                                Objective:  System    Physical Exam    General     ROS    Assessment/Plan:

## 2018-03-28 ENCOUNTER — TELEPHONE (OUTPATIENT)
Dept: NURSING | Facility: CLINIC | Age: 63
End: 2018-03-28

## 2018-03-28 DIAGNOSIS — N64.4 BREAST PAIN, RIGHT: Primary | ICD-10-CM

## 2018-03-28 NOTE — TELEPHONE ENCOUNTER
[facility], they received the u/s order. They will contact patient today to schedule appt for after MD Riley sees pt at 3:40p on 4/2/18.

## 2018-03-28 NOTE — TELEPHONE ENCOUNTER
I am going to want a diagnostic mammo and breast ultrasound  [facility] after her visit with me.   She could make the appt now to speed things up  I am placing order now

## 2018-03-28 NOTE — TELEPHONE ENCOUNTER
"Patient calling in with symptoms    Reports R breast pain \"for years.\" Superficial sensitive-to-touch spot on areola near nipple  Calling today because pain now more constant, rates a 2-3/10, not bad, \"just annoying\"  No lumps, d/c, or change in appearance  No vaginal bleeding (post-menopausal). Reports yearly mammograms have been clear (most recent 12/2017)  U/s done 8-10 years ago, breast redux 10 years ago    Wonders if change in discomfort is r/t musculoskeletal issues since pain is worse with a R shoulder rotator cuff dx 1 month ago. Started PT 1 week ago for that. Reducing caffeine helped this breast pain before, not working now    Pt unsure if she's ever discussed this pain with MD Riley (no related notes). Wonders if she needs another u/s or visit to [facility]. Had pt make appt 4/2/18 with MD Riley for evaluation. Discussed calling clinic before then if sig change in sx or pain to be seen sooner. Pt verbalized understanding.    Routing to MD Riley to review and see if there is anything else you would add. Thank you!                    "

## 2018-03-30 ENCOUNTER — HOSPITAL ENCOUNTER (OUTPATIENT)
Dept: MAMMOGRAPHY | Facility: CLINIC | Age: 63
End: 2018-03-30
Attending: OBSTETRICS & GYNECOLOGY
Payer: COMMERCIAL

## 2018-03-30 ENCOUNTER — HOSPITAL ENCOUNTER (OUTPATIENT)
Dept: MAMMOGRAPHY | Facility: CLINIC | Age: 63
Discharge: HOME | End: 2018-03-30
Attending: OBSTETRICS & GYNECOLOGY | Admitting: OBSTETRICS & GYNECOLOGY
Payer: COMMERCIAL

## 2018-03-30 DIAGNOSIS — N64.4 BREAST PAIN, RIGHT: ICD-10-CM

## 2018-03-30 PROCEDURE — 76642 ULTRASOUND BREAST LIMITED: CPT | Mod: RT

## 2018-03-30 PROCEDURE — G0279 TOMOSYNTHESIS, MAMMO: HCPCS

## 2018-04-03 ENCOUNTER — THERAPY VISIT (OUTPATIENT)
Dept: PHYSICAL THERAPY | Facility: CLINIC | Age: 63
End: 2018-04-03
Payer: COMMERCIAL

## 2018-04-03 DIAGNOSIS — M75.41 IMPINGEMENT SYNDROME OF SHOULDER REGION, RIGHT: ICD-10-CM

## 2018-04-03 PROCEDURE — 97110 THERAPEUTIC EXERCISES: CPT | Mod: GP

## 2018-04-03 PROCEDURE — 97112 NEUROMUSCULAR REEDUCATION: CPT | Mod: GP

## 2018-04-11 ENCOUNTER — THERAPY VISIT (OUTPATIENT)
Dept: PHYSICAL THERAPY | Facility: CLINIC | Age: 63
End: 2018-04-11
Payer: COMMERCIAL

## 2018-04-11 DIAGNOSIS — M75.41 IMPINGEMENT SYNDROME OF SHOULDER REGION, RIGHT: ICD-10-CM

## 2018-04-11 PROCEDURE — 97110 THERAPEUTIC EXERCISES: CPT | Mod: GP

## 2018-04-11 PROCEDURE — 97112 NEUROMUSCULAR REEDUCATION: CPT | Mod: GP

## 2018-07-18 ENCOUNTER — TRANSFERRED RECORDS (OUTPATIENT)
Dept: HEALTH INFORMATION MANAGEMENT | Facility: CLINIC | Age: 63
End: 2018-07-18

## 2018-07-27 ENCOUNTER — TELEPHONE (OUTPATIENT)
Dept: FAMILY MEDICINE | Facility: CLINIC | Age: 63
End: 2018-07-27

## 2018-07-27 NOTE — TELEPHONE ENCOUNTER
Per outreach, patient is aware of overdue screening and will call on their own time for scheduling. STATES SHE WILL GET IT DONE AT HER NEXT ANNUAL PHYSICAL     Screening: Preventive Health Screening Cervical/PAP    SHE ALSO GETS THERE COLONOSCOPY DONE ELSEWHERE    Thanks

## 2018-09-10 ENCOUNTER — OFFICE VISIT (OUTPATIENT)
Dept: FAMILY MEDICINE | Facility: CLINIC | Age: 63
End: 2018-09-10
Payer: COMMERCIAL

## 2018-09-10 VITALS
TEMPERATURE: 97.2 F | HEIGHT: 65 IN | DIASTOLIC BLOOD PRESSURE: 80 MMHG | WEIGHT: 148 LBS | HEART RATE: 61 BPM | OXYGEN SATURATION: 99 % | SYSTOLIC BLOOD PRESSURE: 128 MMHG | BODY MASS INDEX: 24.66 KG/M2

## 2018-09-10 DIAGNOSIS — Z01.818 PREOP GENERAL PHYSICAL EXAM: Primary | ICD-10-CM

## 2018-09-10 DIAGNOSIS — G89.29 CHRONIC LEFT SHOULDER PAIN: ICD-10-CM

## 2018-09-10 DIAGNOSIS — M25.512 CHRONIC LEFT SHOULDER PAIN: ICD-10-CM

## 2018-09-10 LAB
HGB BLD-MCNC: 13.1 G/DL (ref 11.7–15.7)
PLATELET # BLD AUTO: 172 10E9/L (ref 150–450)

## 2018-09-10 PROCEDURE — 36415 COLL VENOUS BLD VENIPUNCTURE: CPT | Performed by: INTERNAL MEDICINE

## 2018-09-10 PROCEDURE — 85018 HEMOGLOBIN: CPT | Performed by: INTERNAL MEDICINE

## 2018-09-10 PROCEDURE — 99214 OFFICE O/P EST MOD 30 MIN: CPT | Performed by: INTERNAL MEDICINE

## 2018-09-10 PROCEDURE — 85049 AUTOMATED PLATELET COUNT: CPT | Performed by: INTERNAL MEDICINE

## 2018-09-10 PROCEDURE — 93000 ELECTROCARDIOGRAM COMPLETE: CPT | Performed by: INTERNAL MEDICINE

## 2018-09-10 NOTE — PROGRESS NOTES
[address] [address]  [phone]  Dept: [phone]    PRE-OP EVALUATION:  Today's date: 9/10/2018    Neli Gregorio (: 1955) presents for pre-operative evaluation assessment as requested by Dr. Thanh Barajas.  She requires evaluation and anesthesia risk assessment prior to undergoing surgery/procedure for treatment of chronic Left Shoulder pain.    Fax number for surgical facility: [phone]  Primary Physician: Guru Mccord  Type of Anesthesia Anticipated: General  Time of Procedure: 6:30am  Patient has a Health Care Directive or Living Will:  NO    Preop Questions 9/10/2018   Who is doing your surgery? Dr. Thanh Barajas   What are you having done? left shoulder arthroscopic   Date of Surgery/Procedure: 2018   Facility or Hospital where procedure/surgery will be performed: [facility]   1.  Do you have a history of Heart attack, stroke, stent, coronary bypass surgery, or other heart surgery? No   2.  Do you ever have any pain or discomfort in your chest? No   3.  Do you have a history of  Heart Failure? No   4.   Are you troubled by shortness of breath when:  walking on a level surface, or up a slight hill, or at night? No   5.  Do you currently have a cold, bronchitis or other respiratory infection? No   6.  Do you have a cough, shortness of breath, or wheezing? No   7.  Do you sometimes get pains in the calves of your legs when you walk? No   8. Do you or anyone in your family have previous history of blood clots? No   9.  Do you or does anyone in your family have a serious bleeding problem such as prolonged bleeding following surgeries or cuts? No   10. Have you ever had problems with anemia or been told to take iron pills? YES - Hx of in 20's.   11. Have you had any abnormal blood loss such as black, tarry or bloody stools, or abnormal vaginal bleeding? No   12. Have you ever had a blood transfusion? No   13. Have you or  any of your relatives ever had problems with anesthesia? No   14. Do you have sleep apnea, excessive snoring or daytime drowsiness? No   15. Do you have any prosthetic heart valves? No   16. Do you have prosthetic joints? No   17. Is there any chance that you may be pregnant? No         HPI:     HPI related to upcoming procedure: patient Neli Gregorio is a very pleasant 63 year old female with history of chronic left shoulder pains who presents to internal medicine clinic for pre op cardiac evaluation for upcoming ortho surgery for   left shoulder arthroscopic surgery for treatment of chronic left shoulder pains. Patient denies any chest pain, headaches, fever or chills. Patient denies any known CAD, CVA or Type 2 Diabetes. Patient does not take any daily aspirin or any other blood thinner medications. Patient denies any known allergies to anesthesia agents.       MEDICAL HISTORY:     Patient Active Problem List    Diagnosis Date Noted     Impingement syndrome of shoulder region, right 03/21/2018     Priority: Medium     Hyperlipidemia with target LDL less than 100 07/16/2015     Priority: Medium     Diagnosis updated by automated process. Provider to review and confirm.        Past Medical History:   Diagnosis Date     Abdominal pain      Atrophic vaginitis      Decreased libido 2015    trial of testosterone cream. Not interested in IM.     Hypercholesteraemia 2010     Hyperlipidemia     PATIENT STARTED LOVASTATIN 12/09     Insomnia, unspecified     Takes 1/4 tab of Ambien 5mg nightly.      Proctitis      Psoriasis     Dx originally by derm, mild disease. TCM prn.     Past Surgical History:   Procedure Laterality Date     MAMMOPLASTY AUGMENTATION  2007    Breast lift     Current Outpatient Prescriptions   Medication Sig Dispense Refill     atorvastatin (LIPITOR) 40 MG tablet Take 1 tablet (40 mg) by mouth daily (Patient taking differently: Take 20 mg by mouth daily ) 90 tablet 3     desonide (DESOWEN) 0.05 % cream  "Apply sparingly to vulva area two times daily for 14 days. 15 g 3     EPINEPHrine (EPIPEN) 0.3 MG/0.3ML injection Inject 0.3 mLs (0.3 mg) into the muscle once as needed for anaphylaxis       OTC products: None, except as noted above    Allergies   Allergen Reactions     Cats Other (See Comments)     scratchy throat        Latex Allergy: NO    Social History   Substance Use Topics     Smoking status: Former Smoker     Quit date: 1/1/1985     Smokeless tobacco: Never Used     Alcohol use 0.0 oz/week     0 Standard drinks or equivalent per week      Comment: 1/day     History   Drug Use No       REVIEW OF SYSTEMS:   Constitutional, neuro, ENT, endocrine, pulmonary, cardiac, gastrointestinal, genitourinary, musculoskeletal, integument and psychiatric systems are negative, except as otherwise noted.    EXAM:   /80 (BP Location: Left arm, Patient Position: Sitting, Cuff Size: Adult Regular)  Pulse 61  Temp 97.2  F (36.2  C) (Oral)  Ht 5' 5\" (1.651 m)  Wt 148 lb (67.1 kg)  SpO2 99%  Breastfeeding? No  BMI 24.63 kg/m2    GENERAL APPEARANCE: healthy, alert and no distress     EYES: EOMI, PERRL     HENT: ear canals and TM's normal and nose and mouth without ulcers or lesions     NECK: no adenopathy, no asymmetry, masses, or scars and thyroid normal to palpation     RESP: lungs clear to auscultation - no rales, rhonchi or wheezes     CV: regular rates and rhythm, normal S1 S2, no S3 or S4 and no murmur, click or rub     ABDOMEN:  soft, nontender, no HSM or masses and bowel sounds normal     MS: left shoulder pains noted     SKIN: no suspicious lesions or rashes     NEURO: Normal strength and tone, sensory exam grossly normal, mentation intact and speech normal     PSYCH: mentation appears normal. and affect normal/bright     LYMPHATICS: No cervical adenopathy    DIAGNOSTICS:   EKG: sinus bradycardia, normal axis, normal intervals, no acute ST/T changes c/w ischemia, no LVH by voltage criteria    Recent Labs   Lab " Test  12/20/17   0849  12/14/16   0931   NA  139  139   POTASSIUM  3.9  3.6   CR  0.64  0.71       Lab Results   Component Value Date    HGB 13.1 09/10/2018       Lab Results   Component Value Date     09/10/2018         IMPRESSION:   Reason for surgery/procedure: chronic left shoulder pains.   Diagnosis/reason for consult: pre op cardiac evaluation for upcoming ortho surgery for left shoulder arthroscopic surgery for treatment of chronic left shoulder pains.     The proposed surgical procedure is considered INTERMEDIATE risk.    REVISED CARDIAC RISK INDEX  The patient has the following serious cardiovascular risks for perioperative complications such as (MI, PE, VFib and 3  AV Block):  No serious cardiac risks  INTERPRETATION: 0 risks: Class I (very low risk - 0.4% complication rate)    The patient has the following additional risks for perioperative complications:  No identified additional risks      ICD-10-CM    1. Preop general physical exam Z01.818 EKG 12-lead complete w/read - Clinics     Hemoglobin     Platelet count   2. Chronic left shoulder pain M25.512 EKG 12-lead complete w/read - Clinics    G89.29 Hemoglobin     Platelet count       RECOMMENDATIONS:     --Patient is to take all scheduled medications on the day of surgery.    APPROVAL GIVEN to proceed with proposed procedure, without further diagnostic evaluation       Signed Electronically by: Berna Goodson MD    Copy of this evaluation report is provided to requesting physician.    [facility] Preop Guidelines    Revised Cardiac Risk Index

## 2018-09-10 NOTE — MR AVS SNAPSHOT
After Visit Summary   9/10/2018    Neli Gregorio    MRN: [id number]           Patient Information     Date Of Birth          1955        Visit Information        Provider Department      9/10/2018 4:40 PM Berna Goodson MD [facility]        Today's Diagnoses     Preop general physical exam    -  1    Chronic left shoulder pain          Care Instructions      Before Your Surgery      Call your surgeon if there is any change in your health. This includes signs of a cold or flu (such as a sore throat, runny nose, cough, rash or fever).    Do not smoke, drink alcohol or take over the counter medicine (unless your surgeon or primary care doctor tells you to) for the 24 hours before and after surgery.    If you take prescribed drugs: Follow your doctor s orders about which medicines to take and which to stop until after surgery.    Eating and drinking prior to surgery: follow the instructions from your surgeon    Take a shower or bath the night before surgery. Use the soap your surgeon gave you to gently clean your skin. If you do not have soap from your surgeon, use your regular soap. Do not shave or scrub the surgery site.  Wear clean pajamas and have clean sheets on your bed.           Follow-ups after your visit        Who to contact     If you have questions or need follow up information about today's clinic visit or your schedule please contact [facility] directly at [phone].  Normal or non-critical lab and imaging results will be communicated to you by MyChart, letter or phone within 4 business days after the clinic has received the results. If you do not hear from us within 7 days, please contact the clinic through MyChart or phone. If you have a critical or abnormal lab result, we will notify you by phone as soon as possible.  Submit refill requests through [employer] or call your pharmacy and they will forward the refill request to us. Please allow 3 business  "days for your refill to be completed.          Additional Information About Your Visit        MyChart Information     [employer] gives you secure access to your electronic health record. If you see a primary care provider, you can also send messages to your care team and make appointments. If you have questions, please call your primary care clinic.  If you do not have a primary care provider, please call [phone] and they will assist you.        Care EveryWhere ID     This is your Care EveryWhere ID. This could be used by other organizations to access your [facility] medical records  [id number]        Your Vitals Were     Pulse Temperature Height Pulse Oximetry Breastfeeding? BMI (Body Mass Index)    61 97.2  F (36.2  C) (Oral) 5' 5\" (1.651 m) 99% No 24.63 kg/m2       Blood Pressure from Last 3 Encounters:   09/10/18 128/80   03/06/18 110/75   12/20/17 108/66    Weight from Last 3 Encounters:   09/10/18 148 lb (67.1 kg)   03/06/18 149 lb (67.6 kg)   12/20/17 147 lb (66.7 kg)              We Performed the Following     EKG 12-lead complete w/read - Clinics     Hemoglobin     Platelet count          Today's Medication Changes          These changes are accurate as of 9/10/18  5:26 PM.  If you have any questions, ask your nurse or doctor.               These medicines have changed or have updated prescriptions.        Dose/Directions    atorvastatin 40 MG tablet   Commonly known as:  LIPITOR   This may have changed:  how much to take   Used for:  Hypercholesterolemia        Dose:  40 mg   Take 1 tablet (40 mg) by mouth daily   Quantity:  90 tablet   Refills:  3                Primary Care Provider Office Phone # Fax #    Guru Mccord [phone] [address] [facility] [address]  [place] [address]        Equal Access to Services     ELOINA CATES : Gera Huber, brian blue, kevin pardo, med rosario. So [facility] [phone].    ATENCIÓN: " Si habla arabella, tiene a bo disposición servicios gratuitos de asistencia lingüística. Reji lund [phone].    We comply with applicable federal civil rights laws and [place] laws. We do not discriminate on the basis of race, color, national origin, age, disability, sex, sexual orientation, or gender identity.            Thank you!     Thank you for choosing [facility]  for your care. Our goal is always to provide you with excellent care. Hearing back from our patients is one way we can continue to improve our services. Please take a few minutes to complete the written survey that you may receive in the mail after your visit with us. Thank you!             Your Updated Medication List - Protect others around you: Learn how to safely use, store and throw away your medicines at www.disposemymeds.org.          This list is accurate as of 9/10/18  5:26 PM.  Always use your most recent med list.                   Brand Name Dispense Instructions for use Diagnosis    atorvastatin 40 MG tablet    LIPITOR    90 tablet    Take 1 tablet (40 mg) by mouth daily    Hypercholesterolemia       desonide 0.05 % cream    DESOWEN    15 g    Apply sparingly to vulva area two times daily for 14 days.    Skin irritation       EPINEPHrine 0.3 MG/0.3ML injection 2-pack    EPIPEN/ADRENACLICK/or ANY BX GENERIC EQUIV     Inject 0.3 mLs (0.3 mg) into the muscle once as needed for anaphylaxis

## 2018-09-15 ENCOUNTER — HEALTH MAINTENANCE LETTER (OUTPATIENT)
Age: 63
End: 2018-09-15

## 2018-10-01 ENCOUNTER — TRANSFERRED RECORDS (OUTPATIENT)
Dept: HEALTH INFORMATION MANAGEMENT | Facility: CLINIC | Age: 63
End: 2018-10-01

## 2018-10-24 ENCOUNTER — TRANSFERRED RECORDS (OUTPATIENT)
Dept: HEALTH INFORMATION MANAGEMENT | Facility: CLINIC | Age: 63
End: 2018-10-24

## 2018-10-29 ENCOUNTER — TRANSFERRED RECORDS (OUTPATIENT)
Dept: HEALTH INFORMATION MANAGEMENT | Facility: CLINIC | Age: 63
End: 2018-10-29

## 2018-12-03 ENCOUNTER — TRANSFERRED RECORDS (OUTPATIENT)
Dept: HEALTH INFORMATION MANAGEMENT | Facility: CLINIC | Age: 63
End: 2018-12-03

## 2018-12-17 ENCOUNTER — OFFICE VISIT (OUTPATIENT)
Dept: FAMILY MEDICINE | Facility: CLINIC | Age: 63
End: 2018-12-17
Payer: COMMERCIAL

## 2018-12-17 VITALS — OXYGEN SATURATION: 100 % | HEART RATE: 63 BPM | HEIGHT: 65 IN | BODY MASS INDEX: 24.99 KG/M2 | WEIGHT: 150 LBS

## 2018-12-17 DIAGNOSIS — Z12.11 SPECIAL SCREENING FOR MALIGNANT NEOPLASMS, COLON: ICD-10-CM

## 2018-12-17 DIAGNOSIS — Z01.818 PREOP GENERAL PHYSICAL EXAM: Primary | ICD-10-CM

## 2018-12-17 DIAGNOSIS — K64.9 HEMORRHOIDS, UNSPECIFIED HEMORRHOID TYPE: ICD-10-CM

## 2018-12-17 PROCEDURE — 99214 OFFICE O/P EST MOD 30 MIN: CPT | Performed by: INTERNAL MEDICINE

## 2018-12-17 ASSESSMENT — MIFFLIN-ST. JEOR: SCORE: 1246.28

## 2018-12-17 NOTE — PROGRESS NOTES
[facility]  [address] [address]  [phone]  Dept: [phone]    PRE-OP EVALUATION:  Today's date: 2018    Neli Gregorio (: 1955) presents for pre-operative evaluation assessment as requested by Mary Portillo.  She requires evaluation and anesthesia risk assessment prior to undergoing surgery/procedure for treatment of Hemorrhoids and for colon cancer screening.    Proposed Surgery/ Procedure:   INTRAOPERATIVE COLONOSCOPY & HEMORRHOIDECTOMY INTERNAL  Date of Surgery/ Procedure: 2018  Time of Surgery/ Procedure: 11:50   Hospital/Surgical Facility: [facility]     Primary Physician: Guru Mccord  Type of Anesthesia Anticipated: Monitor Anesthesia Care    Patient has a Health Care Directive or Living Will:  NO    1. NO - Do you have a history of heart attack, stroke, stent, bypass or surgery on an artery in the head, neck, heart or legs?  2. NO - Do you ever have any pain or discomfort in your chest?  3. NO - Do you have a history of  Heart Failure?  4. NO - Are you troubled by shortness of breath when: walking on the level, up a slight hill or at night?  5. NO - Do you currently have a cold, bronchitis or other respiratory infection?  6. NO - Do you have a cough, shortness of breath or wheezing?  7. NO - Do you sometimes get pains in the calves of your legs when you walk?  8. NO - Do you or anyone in your family have previous history of blood clots?  9. NO - Do you or does anyone in your family have a serious bleeding problem such as prolonged bleeding following surgeries or cuts?  10. NO - HAVE YOU EVER HAD PROBLEMS WITH ANEMIA OR BEEN TOLD TO TAKE IRON PILLS?   11. NO - Have you had any abnormal blood loss such as black, tarry or bloody stools, or abnormal vaginal bleeding?  12. NO - Have you ever had a blood transfusion?  13. NO - Have you or any of your relatives ever had problems with anesthesia?  14. NO - Do you have sleep apnea,  excessive snoring or daytime drowsiness?  15. NO - Do you have any prosthetic heart valves?  16. NO - Do you have prosthetic joints?  17. NO - Is there any chance that you may be pregnant?      HPI:     HPI related to upcoming procedure: patient Neli Gregorio is a very pleasant 63 year old female who presents to internal medicine clinic for pre op cardiac evaluation for upcoming INTRAOPERATIVE COLONOSCOPY & HEMORRHOIDECTOMY INTERNAL for treatment of Hemorrhoids and for colon cancer screening. Patient denies any allergies to anesthesia agents. patient denies any chest pain, headaches, fever or chills. Patient denies any history of CAD, CVA or Type 2 Diabetes.     MEDICAL HISTORY:     Patient Active Problem List    Diagnosis Date Noted     Impingement syndrome of shoulder region, right 03/21/2018     Priority: Medium     Hyperlipidemia with target LDL less than 100 07/16/2015     Priority: Medium     Diagnosis updated by automated process. Provider to review and confirm.        Past Medical History:   Diagnosis Date     Abdominal pain      Atrophic vaginitis      Decreased libido 2015    trial of testosterone cream. Not interested in IM.     Hypercholesteraemia 2010     Hyperlipidemia     PATIENT STARTED LOVASTATIN 12/09     Insomnia, unspecified     Takes 1/4 tab of Ambien 5mg nightly.      Proctitis      Psoriasis     Dx originally by derm, mild disease. TCM prn.     Past Surgical History:   Procedure Laterality Date     MAMMOPLASTY AUGMENTATION  2007    Breast lift     Current Outpatient Medications   Medication Sig Dispense Refill     atorvastatin (LIPITOR) 40 MG tablet Take 1 tablet (40 mg) by mouth daily (Patient taking differently: Take 20 mg by mouth daily ) 90 tablet 3     desonide (DESOWEN) 0.05 % cream Apply sparingly to vulva area two times daily for 14 days. 15 g 3     EPINEPHrine (EPIPEN) 0.3 MG/0.3ML injection Inject 0.3 mLs (0.3 mg) into the muscle once as needed for anaphylaxis       OTC products:  None, except as noted above    Allergies   Allergen Reactions     Bee Venom      Cats Other (See Comments)     scratchy throat        Latex Allergy: NO    Social History     Tobacco Use     Smoking status: Former Smoker     Last attempt to quit: 1985     Years since quittin.9     Smokeless tobacco: Never Used   Substance Use Topics     Alcohol use: Yes     Alcohol/week: 0.0 oz     Comment: 1/day     History   Drug Use No       REVIEW OF SYSTEMS:   Constitutional, neuro, ENT, endocrine, pulmonary, cardiac, gastrointestinal, genitourinary, musculoskeletal, integument and psychiatric systems are negative, except as otherwise noted.    EXAM:   There were no vitals taken for this visit.    GENERAL APPEARANCE: healthy, alert and no distress     EYES: EOMI, PERRL     HENT: ear canals and TM's normal and nose and mouth without ulcers or lesions     NECK: no adenopathy, no asymmetry, masses, or scars and thyroid normal to palpation     RESP: lungs clear to auscultation - no rales, rhonchi or wheezes     CV: regular rates and rhythm, normal S1 S2, no S3 or S4 and no murmur, click or rub     ABDOMEN:  soft, nontender, no HSM or masses and bowel sounds normal     MS: extremities normal- no gross deformities noted, no evidence of inflammation in joints, FROM in all extremities.     SKIN: no suspicious lesions or rashes     NEURO: Normal strength and tone, sensory exam grossly normal, mentation intact and speech normal     PSYCH: mentation appears normal. and affect normal/bright     LYMPHATICS: No cervical adenopathy    DIAGNOSTICS:   EKG: appears normal, NSR, normal axis, normal intervals, no acute ST/T changes c/w ischemia, no LVH by voltage criteria    Recent Labs   Lab Test 09/10/18  1706 17  0849 16  0931   HGB 13.1  --   --      --   --    NA  --  139 139   POTASSIUM  --  3.9 3.6   CR  --  0.64 0.71        IMPRESSION:   Reason for surgery/procedure: Hemorrhoids and for colon cancer  screening.  Diagnosis/reason for consult: pre op cardiac evaluation for upcoming INTRAOPERATIVE COLONOSCOPY & HEMORRHOIDECTOMY INTERNAL for treatment of Hemorrhoids and for colon cancer screening.    The proposed surgical procedure is considered INTERMEDIATE risk.    REVISED CARDIAC RISK INDEX  The patient has the following serious cardiovascular risks for perioperative complications such as (MI, PE, VFib and 3  AV Block):  No serious cardiac risks  INTERPRETATION: 0 risks: Class I (very low risk - 0.4% complication rate)    The patient has the following additional risks for perioperative complications:  No identified additional risks      ICD-10-CM    1. Preop general physical exam Z01.818    2. Hemorrhoids, unspecified hemorrhoid type K64.9    3. Special screening for malignant neoplasms, colon Z12.11        RECOMMENDATIONS:     --Patient is to take all scheduled medications on the day of surgery.    APPROVAL GIVEN to proceed with proposed procedure, without further diagnostic evaluation       Signed Electronically by: Berna Goodson MD    Copy of this evaluation report is provided to requesting physician.    [place] Preop Guidelines    Revised Cardiac Risk Index

## 2018-12-21 ENCOUNTER — ANESTHESIA EVENT (OUTPATIENT)
Dept: SURGERY | Facility: CLINIC | Age: 63
End: 2018-12-21
Payer: COMMERCIAL

## 2018-12-21 ENCOUNTER — HOSPITAL ENCOUNTER (OUTPATIENT)
Facility: CLINIC | Age: 63
Discharge: HOME | End: 2018-12-21
Attending: COLON & RECTAL SURGERY | Admitting: COLON & RECTAL SURGERY
Payer: COMMERCIAL

## 2018-12-21 ENCOUNTER — ANESTHESIA (OUTPATIENT)
Dept: SURGERY | Facility: CLINIC | Age: 63
End: 2018-12-21
Payer: COMMERCIAL

## 2018-12-21 VITALS
HEIGHT: 65 IN | RESPIRATION RATE: 20 BRPM | HEART RATE: 56 BPM | WEIGHT: 148.5 LBS | DIASTOLIC BLOOD PRESSURE: 76 MMHG | OXYGEN SATURATION: 100 % | BODY MASS INDEX: 24.74 KG/M2 | TEMPERATURE: 97 F | SYSTOLIC BLOOD PRESSURE: 121 MMHG

## 2018-12-21 DIAGNOSIS — K64.9 HEMORRHOIDS, UNSPECIFIED HEMORRHOID TYPE: Primary | ICD-10-CM

## 2018-12-21 LAB — COLONOSCOPY: NORMAL

## 2018-12-21 PROCEDURE — 71000012 ZZH RECOVERY PHASE 1 LEVEL 1 FIRST HR: Performed by: COLON & RECTAL SURGERY

## 2018-12-21 PROCEDURE — 27210794 ZZH OR GENERAL SUPPLY STERILE: Performed by: COLON & RECTAL SURGERY

## 2018-12-21 PROCEDURE — 88304 TISSUE EXAM BY PATHOLOGIST: CPT | Mod: 26 | Performed by: COLON & RECTAL SURGERY

## 2018-12-21 PROCEDURE — 25000128 H RX IP 250 OP 636: Performed by: NURSE ANESTHETIST, CERTIFIED REGISTERED

## 2018-12-21 PROCEDURE — 25000132 ZZH RX MED GY IP 250 OP 250 PS 637: Performed by: COLON & RECTAL SURGERY

## 2018-12-21 PROCEDURE — 37000009 ZZH ANESTHESIA TECHNICAL FEE, EACH ADDTL 15 MIN: Performed by: COLON & RECTAL SURGERY

## 2018-12-21 PROCEDURE — 37000008 ZZH ANESTHESIA TECHNICAL FEE, 1ST 30 MIN: Performed by: COLON & RECTAL SURGERY

## 2018-12-21 PROCEDURE — 88304 TISSUE EXAM BY PATHOLOGIST: CPT | Performed by: COLON & RECTAL SURGERY

## 2018-12-21 PROCEDURE — 40000170 ZZH STATISTIC PRE-PROCEDURE ASSESSMENT II: Performed by: COLON & RECTAL SURGERY

## 2018-12-21 PROCEDURE — 36000052 ZZH SURGERY LEVEL 2 EA 15 ADDTL MIN: Performed by: COLON & RECTAL SURGERY

## 2018-12-21 PROCEDURE — 36000050 ZZH SURGERY LEVEL 2 1ST 30 MIN: Performed by: COLON & RECTAL SURGERY

## 2018-12-21 PROCEDURE — 71000027 ZZH RECOVERY PHASE 2 EACH 15 MINS: Performed by: COLON & RECTAL SURGERY

## 2018-12-21 PROCEDURE — 25000125 ZZHC RX 250: Performed by: NURSE ANESTHETIST, CERTIFIED REGISTERED

## 2018-12-21 RX ORDER — KETOROLAC TROMETHAMINE 30 MG/ML
30 INJECTION, SOLUTION INTRAMUSCULAR; INTRAVENOUS EVERY 6 HOURS PRN
Status: DISCONTINUED | OUTPATIENT
Start: 2018-12-21 | End: 2018-12-21 | Stop reason: HOSPADM

## 2018-12-21 RX ORDER — FENTANYL CITRATE 50 UG/ML
25-50 INJECTION, SOLUTION INTRAMUSCULAR; INTRAVENOUS
Status: DISCONTINUED | OUTPATIENT
Start: 2018-12-21 | End: 2018-12-21 | Stop reason: HOSPADM

## 2018-12-21 RX ORDER — HYDROMORPHONE HYDROCHLORIDE 1 MG/ML
.3-.5 INJECTION, SOLUTION INTRAMUSCULAR; INTRAVENOUS; SUBCUTANEOUS EVERY 10 MIN PRN
Status: DISCONTINUED | OUTPATIENT
Start: 2018-12-21 | End: 2018-12-21 | Stop reason: HOSPADM

## 2018-12-21 RX ORDER — ONDANSETRON 2 MG/ML
4 INJECTION INTRAMUSCULAR; INTRAVENOUS EVERY 30 MIN PRN
Status: DISCONTINUED | OUTPATIENT
Start: 2018-12-21 | End: 2018-12-21 | Stop reason: HOSPADM

## 2018-12-21 RX ORDER — SODIUM CHLORIDE, SODIUM LACTATE, POTASSIUM CHLORIDE, CALCIUM CHLORIDE 600; 310; 30; 20 MG/100ML; MG/100ML; MG/100ML; MG/100ML
INJECTION, SOLUTION INTRAVENOUS CONTINUOUS PRN
Status: DISCONTINUED | OUTPATIENT
Start: 2018-12-21 | End: 2018-12-21

## 2018-12-21 RX ORDER — ACETAMINOPHEN 325 MG/1
975 TABLET ORAL ONCE
Status: COMPLETED | OUTPATIENT
Start: 2018-12-21 | End: 2018-12-21

## 2018-12-21 RX ORDER — LIDOCAINE HYDROCHLORIDE 20 MG/ML
INJECTION, SOLUTION INFILTRATION; PERINEURAL PRN
Status: DISCONTINUED | OUTPATIENT
Start: 2018-12-21 | End: 2018-12-21

## 2018-12-21 RX ORDER — HYDRALAZINE HYDROCHLORIDE 20 MG/ML
2.5-5 INJECTION INTRAMUSCULAR; INTRAVENOUS EVERY 10 MIN PRN
Status: DISCONTINUED | OUTPATIENT
Start: 2018-12-21 | End: 2018-12-21 | Stop reason: HOSPADM

## 2018-12-21 RX ORDER — LIDOCAINE 40 MG/G
CREAM TOPICAL
Status: DISCONTINUED | OUTPATIENT
Start: 2018-12-21 | End: 2018-12-21 | Stop reason: HOSPADM

## 2018-12-21 RX ORDER — DEXAMETHASONE SODIUM PHOSPHATE 4 MG/ML
4 INJECTION, SOLUTION INTRA-ARTICULAR; INTRALESIONAL; INTRAMUSCULAR; INTRAVENOUS; SOFT TISSUE EVERY 10 MIN PRN
Status: DISCONTINUED | OUTPATIENT
Start: 2018-12-21 | End: 2018-12-21 | Stop reason: HOSPADM

## 2018-12-21 RX ORDER — SODIUM CHLORIDE, SODIUM LACTATE, POTASSIUM CHLORIDE, CALCIUM CHLORIDE 600; 310; 30; 20 MG/100ML; MG/100ML; MG/100ML; MG/100ML
INJECTION, SOLUTION INTRAVENOUS CONTINUOUS
Status: DISCONTINUED | OUTPATIENT
Start: 2018-12-21 | End: 2018-12-21 | Stop reason: HOSPADM

## 2018-12-21 RX ORDER — PROPOFOL 10 MG/ML
INJECTION, EMULSION INTRAVENOUS PRN
Status: DISCONTINUED | OUTPATIENT
Start: 2018-12-21 | End: 2018-12-21

## 2018-12-21 RX ORDER — PROPOFOL 10 MG/ML
INJECTION, EMULSION INTRAVENOUS CONTINUOUS PRN
Status: DISCONTINUED | OUTPATIENT
Start: 2018-12-21 | End: 2018-12-21

## 2018-12-21 RX ORDER — ONDANSETRON 4 MG/1
4 TABLET, ORALLY DISINTEGRATING ORAL EVERY 30 MIN PRN
Status: DISCONTINUED | OUTPATIENT
Start: 2018-12-21 | End: 2018-12-21 | Stop reason: HOSPADM

## 2018-12-21 RX ORDER — MEPERIDINE HYDROCHLORIDE 25 MG/ML
12.5 INJECTION INTRAMUSCULAR; INTRAVENOUS; SUBCUTANEOUS
Status: DISCONTINUED | OUTPATIENT
Start: 2018-12-21 | End: 2018-12-21 | Stop reason: HOSPADM

## 2018-12-21 RX ORDER — NALOXONE HYDROCHLORIDE 0.4 MG/ML
.1-.4 INJECTION, SOLUTION INTRAMUSCULAR; INTRAVENOUS; SUBCUTANEOUS
Status: DISCONTINUED | OUTPATIENT
Start: 2018-12-21 | End: 2018-12-21 | Stop reason: HOSPADM

## 2018-12-21 RX ORDER — LABETALOL HYDROCHLORIDE 5 MG/ML
10 INJECTION, SOLUTION INTRAVENOUS
Status: DISCONTINUED | OUTPATIENT
Start: 2018-12-21 | End: 2018-12-21 | Stop reason: HOSPADM

## 2018-12-21 RX ORDER — ONDANSETRON 2 MG/ML
4 INJECTION INTRAMUSCULAR; INTRAVENOUS
Status: DISCONTINUED | OUTPATIENT
Start: 2018-12-21 | End: 2018-12-21 | Stop reason: HOSPADM

## 2018-12-21 RX ORDER — OXYCODONE HYDROCHLORIDE 5 MG/1
5-10 TABLET ORAL EVERY 4 HOURS PRN
Qty: 10 TABLET | Refills: 0 | Status: SHIPPED | OUTPATIENT
Start: 2018-12-21 | End: 2019-02-20

## 2018-12-21 RX ADMIN — SODIUM CHLORIDE, POTASSIUM CHLORIDE, SODIUM LACTATE AND CALCIUM CHLORIDE: 600; 310; 30; 20 INJECTION, SOLUTION INTRAVENOUS at 12:00

## 2018-12-21 RX ADMIN — PROPOFOL 80 MG: 10 INJECTION, EMULSION INTRAVENOUS at 12:11

## 2018-12-21 RX ADMIN — ACETAMINOPHEN 975 MG: 325 TABLET, FILM COATED ORAL at 11:23

## 2018-12-21 RX ADMIN — MIDAZOLAM 2 MG: 1 INJECTION INTRAMUSCULAR; INTRAVENOUS at 12:09

## 2018-12-21 RX ADMIN — PROPOFOL 150 MCG/KG/MIN: 10 INJECTION, EMULSION INTRAVENOUS at 12:11

## 2018-12-21 RX ADMIN — LIDOCAINE HYDROCHLORIDE 60 MG: 20 INJECTION, SOLUTION INFILTRATION; PERINEURAL at 12:09

## 2018-12-21 ASSESSMENT — MIFFLIN-ST. JEOR: SCORE: 1239.47

## 2018-12-21 NOTE — ANESTHESIA PREPROCEDURE EVALUATION
Anesthesia Pre-Procedure Evaluation    Patient: Neli Gregorio   MRN: [id number] : 1955          Preoperative Diagnosis: HEMORRHOIDS     Procedure(s):  INTRAOPERATIVE COLONOSCOPY  HEMORRHOIDECTOMY INTERNAL    Past Medical History:   Diagnosis Date     Abdominal pain      Atrophic vaginitis      Decreased libido     trial of testosterone cream. Not interested in IM.     Hypercholesteraemia      Hyperlipidemia     PATIENT STARTED LOVASTATIN      Insomnia, unspecified     Takes 1/4 tab of Ambien 5mg nightly.      Proctitis      Psoriasis     Dx originally by derm, mild disease. TCM prn.     Past Surgical History:   Procedure Laterality Date     MAMMOPLASTY AUGMENTATION      Breast lift       Anesthesia Evaluation     . Pt has had prior anesthetic.     No history of anesthetic complications          ROS/MED HX    ENT/Pulmonary:      (-) sleep apnea   Neurologic:  - neg neurologic ROS     Cardiovascular:     (+) Dyslipidemia, ----. : . . . :. .       METS/Exercise Tolerance:     Hematologic:         Musculoskeletal: Comment: Shoulder impingement syndrome        GI/Hepatic:        (-) GERD   Renal/Genitourinary:  - ROS Renal section negative       Endo:  - neg endo ROS       Psychiatric:         Infectious Disease:         Malignancy:         Other:                          Physical Exam  Normal systems: cardiovascular and pulmonary    Airway   Mallampati: II  TM distance: >3 FB  Neck ROM: full    Dental   (+) caps    Cardiovascular       Pulmonary             Lab Results   Component Value Date    HGB 13.1 09/10/2018     09/10/2018     2017    POTASSIUM 3.9 2017    CHLORIDE 105 2017    CO2 28 2017    BUN 17 2017    CR 0.64 2017    GLC 81 2017    LIBIA 8.9 2017    ALBUMIN 4.2 2017    PROTTOTAL 6.7 (L) 2017    ALT 26 2017    AST 18 2017    ALKPHOS 53 2017    BILITOTAL 0.6 2017       Preop Vitals  BP  "Readings from Last 3 Encounters:   12/21/18 127/81   09/10/18 128/80   03/06/18 110/75    Pulse Readings from Last 3 Encounters:   12/17/18 63   09/10/18 61   03/06/18 70      Resp Readings from Last 3 Encounters:   12/21/18 16   02/17/16 16   02/01/16 16    SpO2 Readings from Last 3 Encounters:   12/21/18 100%   12/17/18 100%   09/10/18 99%      Temp Readings from Last 1 Encounters:   12/21/18 36.5  C (97.7  F) (Temporal)    Ht Readings from Last 1 Encounters:   12/21/18 1.651 m (5' 5\")      Wt Readings from Last 1 Encounters:   12/21/18 67.4 kg (148 lb 8 oz)    Estimated body mass index is 24.71 kg/m  as calculated from the following:    Height as of this encounter: 1.651 m (5' 5\").    Weight as of this encounter: 67.4 kg (148 lb 8 oz).       Anesthesia Plan      History & Physical Review  History and physical reviewed and following examination; no interval change.    ASA Status:  2 .    NPO Status:  > 8 hours    Plan for MAC Reason for MAC:  Deep or markedly invasive procedure (G8)         Postoperative Care  Postoperative pain management:  IV analgesics.      Consents  Anesthetic plan, risks, benefits and alternatives discussed with:  Patient..                 Hector Metcalf MD  "

## 2018-12-21 NOTE — ANESTHESIA CARE TRANSFER NOTE
Patient: Neli Gregorio    Procedure(s):  INTRAOPERATIVE COLONOSCOPY  HEMORRHOIDECTOMY INTERNAL    Diagnosis: HEMORRHOIDS   Diagnosis Additional Information: No value filed.    Anesthesia Type:   MAC     Note:    Patient transferred to:PACU  Handoff Report: Identifed the Patient, Identified the Reponsible Provider, Reviewed the pertinent medical history, Discussed the surgical course, Reviewed Intra-OP anesthesia mangement and issues during anesthesia, Set expectations for post-procedure period and Allowed opportunity for questions and acknowledgement of understanding      Vitals: (Last set prior to Anesthesia Care Transfer)    CRNA VITALS  12/21/2018 1244 - 12/21/2018 1319      12/21/2018             Pulse:  85    SpO2:  99 %    Resp Rate (set):  10                Electronically Signed By: LETICIA Cash CRNA  December 21, 2018  1:19 PM

## 2018-12-21 NOTE — BRIEF OP NOTE
[facility]    Brief Operative Note    Pre-operative diagnosis: HEMORRHOIDS   Post-operative diagnosis normal colon, mixed hemorrhoids  Procedure: Procedure(s):  INTRAOPERATIVE COLONOSCOPY  HEMORRHOIDECTOMY INTERNAL  Surgeon: Surgeon(s) and Role:     * Mary Grigsby MD - Primary  Anesthesia: Monitor Anesthesia Care   Estimated blood loss:   5 cc  Drains: None  Specimens:   ID Type Source Tests Collected by Time Destination   A : hemorrhoids Tissue Other SURGICAL PATHOLOGY EXAM Mary Grigsby MD 12/21/2018  1:02 PM      Findings:   Normal colon, mixed anterior hemorrhoid.  Complications: None.  Implants: None.

## 2018-12-21 NOTE — DISCHARGE INSTRUCTIONS

## 2018-12-21 NOTE — ANESTHESIA POSTPROCEDURE EVALUATION
Patient: Neli Gregorio    Procedure(s):  INTRAOPERATIVE COLONOSCOPY  HEMORRHOIDECTOMY INTERNAL    Diagnosis:HEMORRHOIDS   Diagnosis Additional Information: No value filed.    Anesthesia Type:  MAC    Note:  Anesthesia Post Evaluation    Patient location during evaluation: PACU  Patient participation: Able to fully participate in evaluation  Level of consciousness: awake and alert  Pain management: adequate  Airway patency: patent  Cardiovascular status: acceptable  Respiratory status: acceptable and unassisted  Hydration status: acceptable  PONV: none             Last vitals:  Vitals:    12/21/18 1049 12/21/18 1316 12/21/18 1319   BP: 127/81 (!) 86/73 112/68   Pulse:  78 73   Resp: 16 16 21   Temp: 36.5  C (97.7  F) 36.1  C (97  F)    SpO2: 100% 100% 100%         Electronically Signed By: Hector Metcalf MD  December 21, 2018  1:58 PM

## 2018-12-24 LAB — COPATH REPORT: NORMAL

## 2018-12-27 ENCOUNTER — OFFICE VISIT (OUTPATIENT)
Dept: FAMILY MEDICINE | Facility: CLINIC | Age: 63
End: 2018-12-27
Payer: COMMERCIAL

## 2018-12-27 VITALS
TEMPERATURE: 97.7 F | DIASTOLIC BLOOD PRESSURE: 77 MMHG | SYSTOLIC BLOOD PRESSURE: 115 MMHG | BODY MASS INDEX: 24.99 KG/M2 | HEART RATE: 53 BPM | HEIGHT: 65 IN | WEIGHT: 150 LBS

## 2018-12-27 DIAGNOSIS — L67.9 HAIR CHANGES: Primary | ICD-10-CM

## 2018-12-27 PROCEDURE — 99213 OFFICE O/P EST LOW 20 MIN: CPT | Performed by: NURSE PRACTITIONER

## 2018-12-27 PROCEDURE — 82728 ASSAY OF FERRITIN: CPT | Performed by: NURSE PRACTITIONER

## 2018-12-27 PROCEDURE — 36415 COLL VENOUS BLD VENIPUNCTURE: CPT | Performed by: NURSE PRACTITIONER

## 2018-12-27 PROCEDURE — 84443 ASSAY THYROID STIM HORMONE: CPT | Performed by: NURSE PRACTITIONER

## 2018-12-27 ASSESSMENT — MIFFLIN-ST. JEOR: SCORE: 1246.28

## 2018-12-27 NOTE — PATIENT INSTRUCTIONS
Think about a multivitamin. [employer] is a good company     Try to cut out dairy and gluten to see how your stomach does

## 2018-12-27 NOTE — PROGRESS NOTES
HPI    SUBJECTIVE:   Neli Gregorio is a 63 year old female who presents to clinic today for the following health issues:      Issues with hair and skin  Hair issues on and off for years. Hair is significantly thinner and breaks   Skin is different. Extra dry and itchy   Is always bloated   Just had a colonoscopy that was normal   Diet is fairly good.   No significant stress. Had left shoulder surgery in Sept and that is healing well   Otherwise she feels well     Past Medical History:   Diagnosis Date     Abdominal pain      Atrophic vaginitis      Decreased libido     trial of testosterone cream. Not interested in IM.     Hypercholesteraemia      Hyperlipidemia     PATIENT STARTED LOVASTATIN      Insomnia, unspecified     Takes 1/4 tab of Ambien 5mg nightly.      Proctitis      Psoriasis     Dx originally by derm, mild disease. TCM prn.     Family History   Problem Relation Age of Onset     Heart Disease Father      Hypertension Father      Hyperlipidemia Father      Alzheimer Disease Mother      Hyperlipidemia Mother      Osteoporosis Other         aunt     Osteoporosis Maternal Grandmother      Past Surgical History:   Procedure Laterality Date     COLONOSCOPY       COLONOSCOPY N/A 2018    Procedure: INTRAOPERATIVE COLONOSCOPY;  Surgeon: Mary Grigsby MD;  Location: [facility] OR     HEMORRHOIDECTOMY INTERNAL N/A 2018    Procedure: HEMORRHOIDECTOMY INTERNAL;  Surgeon: Mary Grigsby MD;  Location:  OR     MAMMOPLASTY AUGMENTATION      Breast lift     ORTHOPEDIC SURGERY  2018    left shoulder repair     Social History     Tobacco Use     Smoking status: Former Smoker     Last attempt to quit: 1985     Years since quittin.0     Smokeless tobacco: Never Used   Substance Use Topics     Alcohol use: Yes     Alcohol/week: 0.0 oz     Comment: 1/day     Current Outpatient Medications   Medication Sig Dispense Refill     atorvastatin (LIPITOR) 40 MG tablet Take 1 tablet (40  "mg) by mouth daily (Patient taking differently: Take 20 mg by mouth daily (0.5 x 40 mg = 20 mg dose)) 90 tablet 3     BIOTIN PO Take 5,000 mcg by mouth daily       Cholecalciferol (VITAMIN D3 PO) Take 2,000 Units by mouth daily       desonide (DESOWEN) 0.05 % cream Apply sparingly to vulva area two times daily for 14 days. (Patient taking differently: Apply sparingly to perineal area two times daily for 14 days.) 15 g 3     EPINEPHrine (EPIPEN) 0.3 MG/0.3ML injection Inject 0.3 mLs (0.3 mg) into the muscle once as needed for anaphylaxis       Allergies   Allergen Reactions     Bee Venom Anaphylaxis     Cats Other (See Comments)     scratchy throat         Reviewed and updated as needed this visit by clinical staff and provider     ROS  Detailed as above     /77 (BP Location: Right arm, Patient Position: Chair, Cuff Size: Adult Large)   Pulse 53   Temp 97.7  F (36.5  C) (Oral)   Ht 1.651 m (5' 5\")   Wt 68 kg (150 lb)   Breastfeeding? No   BMI 24.96 kg/m        Physical Exam   Constitutional: She appears well-developed.   Dry hair   HENT:   Head: Normocephalic.   Eyes: Conjunctivae are normal.   Pulmonary/Chest: Effort normal.   Neurological: She is alert.   Psychiatric: She has a normal mood and affect. Judgment normal.     Assessment and Plan:       ICD-10-CM    1. Hair changes L67.9 TSH with free T4 reflex     Ferritin     JUST IN CASE       Screening labs completed and are normal. Already saw dermatology who was not concerned.   We talked about using natural products and eating a healthy diet and exercising.  She may consider a multivitamin    She could see a functional medicine provider if she desires.   F/u with PCP with further concerns       Nilesh Mckeon, APRN, CNP  [facility]      "

## 2018-12-28 LAB
FERRITIN SERPL-MCNC: 88 NG/ML (ref 8–252)
TSH SERPL DL<=0.005 MIU/L-ACNC: 2.75 MU/L (ref 0.4–4)

## 2018-12-28 NOTE — RESULT ENCOUNTER NOTE
Neli  Your thyroid is absolutely perfect and so are your iron stores. I'm not sure an endocrinologist will help us much with your number being so perfect. One other option is you could consider a function medicine provider. They are not covered by insurance but they really dig into things and could potentially find a cause. I recommend [facility]. They have 3 amazing providers. You can look them up online. Let me know if you have any questions   Nilesh

## 2019-01-16 ENCOUNTER — TRANSFERRED RECORDS (OUTPATIENT)
Dept: HEALTH INFORMATION MANAGEMENT | Facility: CLINIC | Age: 64
End: 2019-01-16

## 2019-01-19 NOTE — OP NOTE
Procedure Date: 12/21/2018      PREOPERATIVE DIAGNOSIS:  Screening colonoscopy.      POSTOPERATIVE DIAGNOSIS:  Hemorrhoids.      PROCEDURE:  Colonoscopy and 1 quadrant hemorrhoidectomy.      SURGEON:  Mary Grigsby MD      ASSISTANT:  None.      ANESTHESIA:  MAC.      INDICATIONS:  The patient is a 63-year-old woman who had been having difficulty with a symptomatic anterior hemorrhoid.  This hemorrhoid had both external and internal components.  After discussion of the options, she wished to have that hemorrhoid excised.  The indications, procedure and risks were discussed with the patient who understands and wishes to proceed.  In addition, the patient was due for a screening colonoscopy as she had not had one for more than 10 years.  The indications, procedure and risks for screening colonoscopy were discussed with the patient and she wished to proceed.      OPERATIVE PROCEDURE:  After mechanical prep, the patient was brought to the operating room.  She was placed in the left lateral position and then received sedation from the anesthesia team.  When she was comfortable, the colonoscopy was performed.  Details of this are documented in [employer]ation, but in summary of her colon was normal.  The patient was then placed in the prone jackknife position.  Her buttocks were taped apart for exposure.  A second timeout was done and everyone present in the operating room agreed to the planned procedure.  The area was prepped and draped in the usual sterile manner.  A perianal nerve block using a mixture of Marcaine, lidocaine and sodium bicarbonate was then induced.  When she was comfortable, an anoscope was introduced.  An elliptical incision was made from the anal verge to up above the anorectal ring, excising the underlying internal and external hemorrhoids in continuity.  Hemostasis was checked for and felt to be adequate.  A proctoplasty was performed with a continuous 3-0 chromic suture.  A pad was placed over the anal  canal.  All sponge, blade and needle counts were reported as correct x 2.  The patient was transferred to the recovery room in stable condition.         MARY SOLOMON MD             D: 2019   T: 2019   MT: JUANITO      Name:     CIRO MANZO   MRN:      [id number]        Account:        [id number]   :      1955           Procedure Date: 2018      Document: [id number]       cc: Mary Riley MD

## 2019-02-19 NOTE — PROGRESS NOTES
Neli is a 63 year old  female who presents for annual exam.     Besides routine health maintenance, she has no other health concerns today .    HPI:    Patient doing well  Takes vit D  Refill meds  Fasting labs today    The patient's PCP is Dr Guru Mccord MD as needed.      GYNECOLOGIC HISTORY:    No LMP recorded. Patient is postmenopausal.  She  reports that she quit smoking about 34 years ago. she has never used smokeless tobacco.  Patient is sexually active.  STD testing offered?  Declined     Last PHQ-9 score on record =   PHQ-9 SCORE 2019   PHQ-9 Total Score 0     Last GAD7 score on record =   GRIFFIN-7 SCORE 2019   Total Score 0     Alcohol Score = 2    HEALTH MAINTENANCE:  Cholesterol: 17   Total= 172, Triglycerides=49, HDL=76, LDL=86, FBS=81, Vit D = 28  Cholesterol   Date Value Ref Range Status   2017 172 <200 mg/dL Final   2016 179 <200 mg/dL Final   Last Mammo: 17, Result: normal, Next Mammo: today   Diagnostic Mammo: 3/20/18 for right breast pain negative  Pap: 1/15/13  Negative, Neg-HPV, due for co-test  Colonoscopy:  18, Result: normal, repeat 10 years, Next Colonoscopy:   Dexa:  14 Spine 0.2, Hip -0.1  Health maintenance updated:  yes    HISTORY:  Obstetric History       T1      L1     SAB0   TAB1   Ectopic0   Multiple0   Live Births1       # Outcome Date GA Lbr Delonte/2nd Weight Sex Delivery Anes PTL Lv   2 Term 85 39w0d  3.345 kg (7 lb 6 oz) M Vag-Vacuum   MAX   1 TAB                   Patient Active Problem List   Diagnosis     Hyperlipidemia with target LDL less than 100     Impingement syndrome of shoulder region, right     Past Surgical History:   Procedure Laterality Date     COLONOSCOPY       COLONOSCOPY N/A 2018    Procedure: INTRAOPERATIVE COLONOSCOPY;  Surgeon: Mary Grigsby MD;  Location: [facility] OR     HEMORRHOIDECTOMY INTERNAL N/A 2018    Procedure: HEMORRHOIDECTOMY INTERNAL;  Surgeon: Mary Grigsby  "MD Shelby;  Location: [facility] OR     MAMMOPLASTY AUGMENTATION  2007    Breast lift     ORTHOPEDIC SURGERY  2018    left shoulder repair      Social History     Tobacco Use     Smoking status: Former Smoker     Last attempt to quit: 1985     Years since quittin.1     Smokeless tobacco: Never Used   Substance Use Topics     Alcohol use: Yes     Alcohol/week: 0.0 oz     Comment: 1/day      Problem (# of Occurrences) Relation (Name,Age of Onset)    Alzheimer Disease (1) Mother    Heart Disease (1) Father    Hyperlipidemia (2) Father, Mother    Hypertension (1) Father    Osteoporosis (2) Other: aunt, Maternal Grandmother            Current Outpatient Medications   Medication Sig     atorvastatin (LIPITOR) 40 MG tablet Take 1 tablet (40 mg) by mouth daily     BIOTIN PO Take 5,000 mcg by mouth daily     Cholecalciferol (VITAMIN D3 PO) Take 4,000 Units by mouth daily      desonide (DESOWEN) 0.05 % cream Apply sparingly to vulva area two times daily for 14 days. (Patient taking differently: Apply sparingly to perineal area two times daily for 14 days.)     EPINEPHrine (EPIPEN) 0.3 MG/0.3ML injection Inject 0.3 mLs (0.3 mg) into the muscle once as needed for anaphylaxis     No current facility-administered medications for this visit.      Allergies   Allergen Reactions     Bee Venom Anaphylaxis     Cats Other (See Comments)     scratchy throat         Past medical, surgical, social and family histories were reviewed and updated in EPIC.    ROS:   12 point review of systems negative other than symptoms noted below.  Head: Nasal Congestion  Genitourinary: No Periods and Vaginal Dryness  Endocrine: Decreased Libido    EXAM:  /70   Ht 1.651 m (5' 5\")   Wt 67.1 kg (148 lb)   BMI 24.63 kg/m     BMI: Body mass index is 24.63 kg/m .    PHYSICAL EXAM:  Constitutional:  Appearance: Well nourished, well developed, alert, in no acute distress  Neck:  Lymph Nodes:  No lymphadenopathy present    Thyroid:  Gland size " normal, nontender, no nodules or masses present  on palpation  Chest:  Respiratory Effort:  Breathing unlabored  Cardiovascular:    Heart: Auscultation:  Regular rate, normal rhythm, no murmurs present  Breasts: Inspection of Breasts:  No lymphadenopathy present., Palpation of Breasts and Axillae:  No masses present on palpation, no breast tenderness., Axillary Lymph Nodes:  No lymphadenopathy present. and No nodularity, asymmetry or nipple discharge bilaterally.  Gastrointestinal:   Abdominal Examination:  Abdomen nontender to palpation, tone normal without rigidity or guarding, no masses present, umbilicus without lesions   Liver and Spleen:  No hepatomegaly present, liver nontender to palpation    Hernias:  No hernias present  Lymphatic: Lymph Nodes:  No other lymphadenopathy present  Skin:  General Inspection:  No rashes present, no lesions present, no areas of  discoloration    Genitalia and Groin:  No rashes present, no lesions present, no areas of  discoloration, no masses present  Neurologic/Psychiatric:    Mental Status:  Oriented X3     Pelvic Exam:  External Genitalia:     Normal appearance for age, no discharge present, no tenderness present, no inflammatory lesions present, color normal  Vagina:     Normal vaginal vault without central or paravaginal defects, no discharge present, no inflammatory lesions present, no masses present  Bladder:     Nontender to palpation  Urethra:   Urethral Body:  Urethra palpation normal, urethra structural support normal   Urethral Meatus:  No erythema or lesions present  Cervix:     Appearance healthy, no lesions present, nontender to palpation, no bleeding present  Uterus:     Uterus: firm, normal sized and nontender, midplane in position.   Adnexa:     No adnexal tenderness present, no adnexal masses present  Perineum:     Perineum within normal limits, no evidence of trauma, no rashes or skin lesions present  Anus:     Anus within normal limits, no hemorrhoids  present  Inguinal Lymph Nodes:     No lymphadenopathy present  Pubic Hair:     Normal pubic hair distribution for age  Genitalia and Groin:     No rashes present, no lesions present, no areas of discoloration, no masses present      COUNSELING:   Reviewed preventive health counseling, as reflected in patient instructions       Regular exercise       Healthy diet/nutrition    BMI: Body mass index is 24.63 kg/m .      ASSESSMENT:  63 year old female with satisfactory annual exam.    ICD-10-CM    1. Encounter for gynecological examination without abnormal finding Z01.419 Pap imaged thin layer screen with HPV - recommended age 30 - 65     HPV High Risk Types DNA Cervical   2. Screening for metabolic disorder Z13.228 Comprehensive metabolic panel   3. Hyperlipidemia with target LDL less than 100 E78.5 Lipid panel reflex to direct LDL Fasting   4. Hypercholesterolemia E78.00 atorvastatin (LIPITOR) 40 MG tablet       PLAN:  Return 1 years  mammo done  Pap and hpv done today  Discussed pap guidelines    Nuvia Riley MD

## 2019-02-20 ENCOUNTER — TELEPHONE (OUTPATIENT)
Dept: OBGYN | Facility: CLINIC | Age: 64
End: 2019-02-20

## 2019-02-20 ENCOUNTER — OFFICE VISIT (OUTPATIENT)
Dept: OBGYN | Facility: CLINIC | Age: 64
End: 2019-02-20
Attending: OBSTETRICS & GYNECOLOGY
Payer: COMMERCIAL

## 2019-02-20 ENCOUNTER — HOSPITAL ENCOUNTER (OUTPATIENT)
Dept: MAMMOGRAPHY | Facility: CLINIC | Age: 64
Discharge: HOME | End: 2019-02-20
Attending: OBSTETRICS & GYNECOLOGY | Admitting: OBSTETRICS & GYNECOLOGY
Payer: COMMERCIAL

## 2019-02-20 VITALS
DIASTOLIC BLOOD PRESSURE: 70 MMHG | SYSTOLIC BLOOD PRESSURE: 112 MMHG | WEIGHT: 148 LBS | HEIGHT: 65 IN | BODY MASS INDEX: 24.66 KG/M2

## 2019-02-20 DIAGNOSIS — Z01.419 ENCOUNTER FOR GYNECOLOGICAL EXAMINATION WITHOUT ABNORMAL FINDING: Primary | ICD-10-CM

## 2019-02-20 DIAGNOSIS — E78.5 HYPERLIPIDEMIA WITH TARGET LDL LESS THAN 100: ICD-10-CM

## 2019-02-20 DIAGNOSIS — Z13.228 SCREENING FOR METABOLIC DISORDER: ICD-10-CM

## 2019-02-20 DIAGNOSIS — Z12.31 VISIT FOR SCREENING MAMMOGRAM: ICD-10-CM

## 2019-02-20 DIAGNOSIS — E78.00 HYPERCHOLESTEROLEMIA: ICD-10-CM

## 2019-02-20 LAB
ALBUMIN SERPL-MCNC: 4.2 G/DL (ref 3.4–5)
ALP SERPL-CCNC: 52 U/L (ref 40–150)
ALT SERPL W P-5'-P-CCNC: 29 U/L (ref 0–50)
ANION GAP SERPL CALCULATED.3IONS-SCNC: <1 MMOL/L (ref 3–14)
AST SERPL W P-5'-P-CCNC: 17 U/L (ref 0–45)
BILIRUB SERPL-MCNC: 0.5 MG/DL (ref 0.2–1.3)
BUN SERPL-MCNC: 9 MG/DL (ref 7–30)
CALCIUM SERPL-MCNC: 8.7 MG/DL (ref 8.5–10.1)
CHLORIDE SERPL-SCNC: 106 MMOL/L (ref 94–109)
CHOLEST SERPL-MCNC: 168 MG/DL
CO2 SERPL-SCNC: 31 MMOL/L (ref 20–32)
CREAT SERPL-MCNC: 0.66 MG/DL (ref 0.52–1.04)
GFR SERPL CREATININE-BSD FRML MDRD: >90 ML/MIN/{1.73_M2}
GLUCOSE SERPL-MCNC: 87 MG/DL (ref 70–99)
HDLC SERPL-MCNC: 70 MG/DL
LDLC SERPL CALC-MCNC: 87 MG/DL
NONHDLC SERPL-MCNC: 98 MG/DL
POTASSIUM SERPL-SCNC: 3.6 MMOL/L (ref 3.4–5.3)
PROT SERPL-MCNC: 6.8 G/DL (ref 6.8–8.8)
SODIUM SERPL-SCNC: 137 MMOL/L (ref 133–144)
TRIGL SERPL-MCNC: 57 MG/DL

## 2019-02-20 PROCEDURE — 80061 LIPID PANEL: CPT | Performed by: OBSTETRICS & GYNECOLOGY

## 2019-02-20 PROCEDURE — 36415 COLL VENOUS BLD VENIPUNCTURE: CPT | Performed by: OBSTETRICS & GYNECOLOGY

## 2019-02-20 PROCEDURE — 99396 PREV VISIT EST AGE 40-64: CPT | Performed by: OBSTETRICS & GYNECOLOGY

## 2019-02-20 PROCEDURE — 80053 COMPREHEN METABOLIC PANEL: CPT | Performed by: OBSTETRICS & GYNECOLOGY

## 2019-02-20 PROCEDURE — 77067 SCR MAMMO BI INCL CAD: CPT

## 2019-02-20 PROCEDURE — 87624 HPV HI-RISK TYP POOLED RSLT: CPT | Performed by: OBSTETRICS & GYNECOLOGY

## 2019-02-20 PROCEDURE — G0145 SCR C/V CYTO,THINLAYER,RESCR: HCPCS | Performed by: OBSTETRICS & GYNECOLOGY

## 2019-02-20 RX ORDER — ATORVASTATIN CALCIUM 40 MG/1
40 TABLET, FILM COATED ORAL DAILY
Qty: 90 TABLET | Refills: 3 | Status: SHIPPED | OUTPATIENT
Start: 2019-02-20 | End: 2020-02-26

## 2019-02-20 ASSESSMENT — PATIENT HEALTH QUESTIONNAIRE - PHQ9
SUM OF ALL RESPONSES TO PHQ QUESTIONS 1-9: 0
5. POOR APPETITE OR OVEREATING: NOT AT ALL

## 2019-02-20 ASSESSMENT — ANXIETY QUESTIONNAIRES
7. FEELING AFRAID AS IF SOMETHING AWFUL MIGHT HAPPEN: NOT AT ALL
1. FEELING NERVOUS, ANXIOUS, OR ON EDGE: NOT AT ALL
GAD7 TOTAL SCORE: 0
IF YOU CHECKED OFF ANY PROBLEMS ON THIS QUESTIONNAIRE, HOW DIFFICULT HAVE THESE PROBLEMS MADE IT FOR YOU TO DO YOUR WORK, TAKE CARE OF THINGS AT HOME, OR GET ALONG WITH OTHER PEOPLE: NOT DIFFICULT AT ALL
6. BECOMING EASILY ANNOYED OR IRRITABLE: NOT AT ALL
5. BEING SO RESTLESS THAT IT IS HARD TO SIT STILL: NOT AT ALL
2. NOT BEING ABLE TO STOP OR CONTROL WORRYING: NOT AT ALL
3. WORRYING TOO MUCH ABOUT DIFFERENT THINGS: NOT AT ALL

## 2019-02-20 ASSESSMENT — MIFFLIN-ST. JEOR: SCORE: 1237.2

## 2019-02-21 ASSESSMENT — ANXIETY QUESTIONNAIRES: GAD7 TOTAL SCORE: 0

## 2019-02-23 LAB
COPATH REPORT: NORMAL
PAP: NORMAL

## 2019-02-25 LAB
FINAL DIAGNOSIS: NORMAL
HPV HR 12 DNA CVX QL NAA+PROBE: NEGATIVE
HPV16 DNA SPEC QL NAA+PROBE: NEGATIVE
HPV18 DNA SPEC QL NAA+PROBE: NEGATIVE
SPECIMEN DESCRIPTION: NORMAL
SPECIMEN SOURCE CVX/VAG CYTO: NORMAL

## 2019-04-18 ENCOUNTER — TRANSFERRED RECORDS (OUTPATIENT)
Dept: HEALTH INFORMATION MANAGEMENT | Facility: CLINIC | Age: 64
End: 2019-04-18

## 2019-06-05 ENCOUNTER — MYC MEDICAL ADVICE (OUTPATIENT)
Dept: FAMILY MEDICINE | Facility: CLINIC | Age: 64
End: 2019-06-05

## 2019-06-05 NOTE — TELEPHONE ENCOUNTER
Vaccine documented in patient's immunization history.    Dorian Sullivan, CMA on 6/5/2019 at 10:26 AM

## 2019-07-19 DIAGNOSIS — R23.8 SKIN IRRITATION: ICD-10-CM

## 2019-07-19 RX ORDER — DESONIDE 0.5 MG/G
CREAM TOPICAL
Qty: 15 G | Refills: 3 | Status: SHIPPED | OUTPATIENT
Start: 2019-07-19 | End: 2020-02-25

## 2019-07-19 NOTE — TELEPHONE ENCOUNTER
Requested Prescriptions   Pending Prescriptions Disp Refills     desonide (DESOWEN) 0.05 % external cream 15 g 3     Sig: Apply sparingly to perineal area two times daily for 14 days.       There is no refill protocol information for this order        Medication refilled  Lia Martinez RN on 7/19/2019 at 3:25 PM

## 2019-07-19 NOTE — TELEPHONE ENCOUNTER
Requested Prescriptions   Pending Prescriptions Disp Refills     desonide (DESOWEN) 0.05 % external cream 15 g 3     Sig: Apply sparingly to perineal area two times daily for 14 days.       There is no refill protocol information for this order        Last Written Prescription Date:  12/20/17  Last Fill Quantity: 15g,  # refills: 3   Last office visit: 2/20/2019 with prescribing provider:  Dr Nuvia Riley   Future Office Visit:

## 2019-08-05 ENCOUNTER — TELEPHONE (OUTPATIENT)
Dept: FAMILY MEDICINE | Facility: CLINIC | Age: 64
End: 2019-08-05

## 2019-08-07 ENCOUNTER — OFFICE VISIT (OUTPATIENT)
Dept: URGENT CARE | Facility: URGENT CARE | Age: 64
End: 2019-08-07
Payer: COMMERCIAL

## 2019-08-07 ENCOUNTER — TELEPHONE (OUTPATIENT)
Dept: FAMILY MEDICINE | Facility: CLINIC | Age: 64
End: 2019-08-07

## 2019-08-07 VITALS
TEMPERATURE: 98.4 F | OXYGEN SATURATION: 98 % | HEART RATE: 76 BPM | DIASTOLIC BLOOD PRESSURE: 81 MMHG | SYSTOLIC BLOOD PRESSURE: 129 MMHG

## 2019-08-07 DIAGNOSIS — R07.81 RIB PAIN ON LEFT SIDE: ICD-10-CM

## 2019-08-07 DIAGNOSIS — M54.50 ACUTE LEFT-SIDED LOW BACK PAIN WITHOUT SCIATICA: Primary | ICD-10-CM

## 2019-08-07 DIAGNOSIS — Z91.030 ALLERGY TO HONEY BEE VENOM: ICD-10-CM

## 2019-08-07 LAB
ALBUMIN UR-MCNC: NEGATIVE MG/DL
APPEARANCE UR: CLEAR
BASOPHILS # BLD AUTO: 0 10E9/L (ref 0–0.2)
BASOPHILS NFR BLD AUTO: 0.2 %
BILIRUB UR QL STRIP: NEGATIVE
COLOR UR AUTO: YELLOW
DIFFERENTIAL METHOD BLD: NORMAL
EOSINOPHIL # BLD AUTO: 0.1 10E9/L (ref 0–0.7)
EOSINOPHIL NFR BLD AUTO: 1.7 %
ERYTHROCYTE [DISTWIDTH] IN BLOOD BY AUTOMATED COUNT: 13.5 % (ref 10–15)
GLUCOSE UR STRIP-MCNC: NEGATIVE MG/DL
HCT VFR BLD AUTO: 41.8 % (ref 35–47)
HGB BLD-MCNC: 13.8 G/DL (ref 11.7–15.7)
HGB UR QL STRIP: ABNORMAL
KETONES UR STRIP-MCNC: NEGATIVE MG/DL
LEUKOCYTE ESTERASE UR QL STRIP: NEGATIVE
LYMPHOCYTES # BLD AUTO: 2 10E9/L (ref 0.8–5.3)
LYMPHOCYTES NFR BLD AUTO: 37.6 %
MCH RBC QN AUTO: 32.5 PG (ref 26.5–33)
MCHC RBC AUTO-ENTMCNC: 33 G/DL (ref 31.5–36.5)
MCV RBC AUTO: 98 FL (ref 78–100)
MONOCYTES # BLD AUTO: 0.5 10E9/L (ref 0–1.3)
MONOCYTES NFR BLD AUTO: 9.5 %
NEUTROPHILS # BLD AUTO: 2.7 10E9/L (ref 1.6–8.3)
NEUTROPHILS NFR BLD AUTO: 51 %
NITRATE UR QL: NEGATIVE
PH UR STRIP: 6.5 PH (ref 5–7)
PLATELET # BLD AUTO: 151 10E9/L (ref 150–450)
RBC # BLD AUTO: 4.25 10E12/L (ref 3.8–5.2)
RBC #/AREA URNS AUTO: NORMAL /HPF
SOURCE: ABNORMAL
SP GR UR STRIP: 1.01 (ref 1–1.03)
UROBILINOGEN UR STRIP-ACNC: 0.2 EU/DL (ref 0.2–1)
WBC # BLD AUTO: 5.3 10E9/L (ref 4–11)
WBC #/AREA URNS AUTO: NORMAL /HPF

## 2019-08-07 PROCEDURE — 99214 OFFICE O/P EST MOD 30 MIN: CPT | Performed by: FAMILY MEDICINE

## 2019-08-07 PROCEDURE — 85025 COMPLETE CBC W/AUTO DIFF WBC: CPT | Performed by: FAMILY MEDICINE

## 2019-08-07 PROCEDURE — 81001 URINALYSIS AUTO W/SCOPE: CPT | Performed by: FAMILY MEDICINE

## 2019-08-07 PROCEDURE — 36415 COLL VENOUS BLD VENIPUNCTURE: CPT | Performed by: FAMILY MEDICINE

## 2019-08-07 RX ORDER — CYCLOBENZAPRINE HCL 10 MG
10 TABLET ORAL 3 TIMES DAILY PRN
Qty: 20 TABLET | Refills: 0 | Status: SHIPPED | OUTPATIENT
Start: 2019-08-07 | End: 2021-07-28

## 2019-08-07 RX ORDER — EPINEPHRINE 0.3 MG/.3ML
0.3 INJECTION SUBCUTANEOUS
Qty: 2 EACH | Refills: 1 | Status: SHIPPED | OUTPATIENT
Start: 2019-08-07 | End: 2020-02-25

## 2019-08-07 NOTE — TELEPHONE ENCOUNTER
"No \"MD\" availability today. Declines nurse practitioner.  Offered urgent care if she prefers after 5 pm. She will consider.    I spoke to patient directly (not spouse).  Felt pretty \"crappy\" on Friday evening.  Pain reached 4/10 on left side UP under rib cage.  Was somewhat positional. Then moved to low back \"like menstrual cramps\" that resolved over the next day or so.  Has not worsened. \"just feels like there's a dull achiness, very generalized in abdomen, pelvis, low back.\"  No fever.        Nausea with eating. \"Eating makes my mid/low back and left side feel worse\". No burping, reflux, or vomiting.  More constipated than usual.   No pain with urination. No urgency or frequency. No blood or odor in urine.      Would like to keep appt.tomorrow with .  To UC or ED with increase in symptoms.  Push fluids today. Small, bland foods as tolerated.      Safia Harris RN    "

## 2019-08-07 NOTE — PROGRESS NOTES
SUBJECTIVE:  Chief Complaint   Patient presents with     Urgent Care     Back Pain     mid and lower back pain on both sides and left side pain right under rib. gets worse when eating x1 week     Neli Gregorio is a 64 year old female with h/o abd pain , hyperlipidemia, insomnia who presents with a chief complaint of bilateral back pain and tenderness.  Symptoms began 7 day(s) ago, are moderate and sudden onset  Context:  Injury:No. She also feels nauseous when eating anything   Is constipated for last 1 week  Though did have BM today, she has been feeling fine otherwise, no recent uri   She had similar episode a week back and the pain lasted for 1 night and then the pain resolved.  Beginning her pain is back she describes as twinges of pain sharp in nature along the left lower rib area radiating from the left lower back to the left lower rib area.  She has no chest heaviness or chest pains or shortness of breath symptoms.  She did have her grandkids and did play with them lifted them up and they weighed 35 to 40 pounds patient does not think that her pain is related to that.    Pain exacerbated by repetitive motion, twisting and flexion/extension Relieved by rest.  She treated it initially with no therapy. This is not the first time this type of pain  has occurred to this patient.   She denies any uti symptoms       Past Medical History:   Diagnosis Date     Abdominal pain      Atrophic vaginitis      Decreased libido 2015    trial of testosterone cream. Not interested in IM.     Hypercholesteraemia 2010     Hyperlipidemia     PATIENT STARTED LOVASTATIN 12/09     Insomnia, unspecified     Takes 1/4 tab of Ambien 5mg nightly.      Proctitis      Psoriasis     Dx originally by derm, mild disease. TCM prn.     Current Outpatient Medications   Medication Sig Dispense Refill     atorvastatin (LIPITOR) 40 MG tablet Take 1 tablet (40 mg) by mouth daily 90 tablet 3     BIOTIN PO Take 5,000 mcg by mouth daily        Cholecalciferol (VITAMIN D3 PO) Take 4,000 Units by mouth daily        cyclobenzaprine (FLEXERIL) 10 MG tablet Take 1 tablet (10 mg) by mouth 3 times daily as needed for muscle spasms 20 tablet 0     desonide (DESOWEN) 0.05 % external cream Apply sparingly to perineal area two times daily for 14 days. 15 g 3     EPINEPHrine (EPIPEN/ADRENACLICK/OR ANY BX GENERIC EQUIV) 0.3 MG/0.3ML injection 2-pack Inject 0.3 mLs (0.3 mg) into the muscle once as needed for anaphylaxis 2 each 1     Social History     Tobacco Use     Smoking status: Former Smoker     Last attempt to quit: 1985     Years since quittin.6     Smokeless tobacco: Never Used   Substance Use Topics     Alcohol use: Yes     Alcohol/week: 0.0 oz     Comment: 1/day       ROS:  10 point ROS of systems including Constitutional, Eyes, Respiratory, Cardiovascular, Gastroenterology, Genitourinary, Muscularskeletal, Psychiatric were all negative except for pertinent positives noted in my HPI           EXAM:   /81   Pulse 76   Temp 98.4  F (36.9  C) (Oral)   SpO2 98%   M/S Exam:back no swelling or erythema noted   GENERAL APPEARANCE: healthy, alert and no distress  CHEST: clear to auscultation  CV: regular rate and rhythm  EXTREMITIES: peripheral pulses normal  SKIN: no suspicious lesions or rashes  NEURO: Normal strength and tone, sensory exam grossly normal, mentation intact and speech normal  No CVA tenderness   X-RAY was not done.    ASSESSMENT:     Encounter Diagnoses   Name Primary?     Acute left-sided low back pain without sciatica Yes     Rib pain on left side      Neli was seen today for urgent care and back pain.    Diagnoses and all orders for this visit:    Acute left-sided low back pain without sciatica  -     CBC with platelets differential  -     *UA reflex to Microscopic and Culture ([place] and [facility] (except [place] and [place])  -     Urine Microscopic  -     cyclobenzaprine (FLEXERIL) 10 MG tablet; Take 1 tablet (10  mg) by mouth 3 times daily as needed for muscle spasms    Rib pain on left side    Allergy to honey bee venom  -     EPINEPHrine (EPIPEN/ADRENACLICK/OR ANY BX GENERIC EQUIV) 0.3 MG/0.3ML injection 2-pack; Inject 0.3 mLs (0.3 mg) into the muscle once as needed for anaphylaxis      Results for orders placed or performed in visit on 08/07/19   CBC with platelets differential   Result Value Ref Range    WBC 5.3 4.0 - 11.0 10e9/L    RBC Count 4.25 3.8 - 5.2 10e12/L    Hemoglobin 13.8 11.7 - 15.7 g/dL    Hematocrit 41.8 35.0 - 47.0 %    MCV 98 78 - 100 fl    MCH 32.5 26.5 - 33.0 pg    MCHC 33.0 31.5 - 36.5 g/dL    RDW 13.5 10.0 - 15.0 %    Platelet Count 151 150 - 450 10e9/L    % Neutrophils 51.0 %    % Lymphocytes 37.6 %    % Monocytes 9.5 %    % Eosinophils 1.7 %    % Basophils 0.2 %    Absolute Neutrophil 2.7 1.6 - 8.3 10e9/L    Absolute Lymphocytes 2.0 0.8 - 5.3 10e9/L    Absolute Monocytes 0.5 0.0 - 1.3 10e9/L    Absolute Eosinophils 0.1 0.0 - 0.7 10e9/L    Absolute Basophils 0.0 0.0 - 0.2 10e9/L    Diff Method Automated Method    *UA reflex to Microscopic and Culture ([place] and [facility] (except [place] and [place])   Result Value Ref Range    Color Urine Yellow     Appearance Urine Clear     Glucose Urine Negative NEG^Negative mg/dL    Bilirubin Urine Negative NEG^Negative    Ketones Urine Negative NEG^Negative mg/dL    Specific Gravity Urine 1.010 1.003 - 1.035    Blood Urine Trace (A) NEG^Negative    pH Urine 6.5 5.0 - 7.0 pH    Protein Albumin Urine Negative NEG^Negative mg/dL    Urobilinogen Urine 0.2 0.2 - 1.0 EU/dL    Nitrite Urine Negative NEG^Negative    Leukocyte Esterase Urine Negative NEG^Negative    Source Midstream Urine    Urine Microscopic   Result Value Ref Range    WBC Urine 0 - 5 OTO5^0 - 5 /HPF    RBC Urine O - 2 OTO2^O - 2 /HPF         PLAN:  See orders in epic  Reviewed all labs  I doubt its kidney stones but if pain gets worsen should follow up   Discussed with pt could be MSK  Also  could be kidney infection but UA normal   Can do tylenol for the pain for next 24 hrs   If pain still persists should follow up for ultrasound   Would treat with flexeril for the pain and muscle tightness   Follow up if  symptoms fail to improve or worsens   Pt understood and agreed with plan   Also needed refill for epipen which was also done for pt  Fide Garcia MD

## 2019-08-07 NOTE — TELEPHONE ENCOUNTER
Reason for call:  Patient reporting a symptom    Symptom or request:  Patient';s spouse Wolf Lawson calling. He says patient is nauseous and pains in back which  thinks is kidney stones.  He is asking if she can be seen today. I offered Nilesh at 4 or 430 but he wants a Dr,  first asked about Dr Mccord or Dr Goodson. She has an appointment scheduled with Dr Goodson tomorrow at 3.    Duration (how long have symptoms been present): not sure, she had pains last week      Additional comments:     Phone Number patient can be reached at:  [phone] is # for Wolf, there is a C2C.  He also gave her numbers, she is at work [phone]  And her cell is [phone]    Best Time:      Can we leave a detailed message on this number:  YES    Call taken on 8/7/2019 at 10:07 AM by Miley Celaya

## 2019-08-08 ENCOUNTER — OFFICE VISIT (OUTPATIENT)
Dept: FAMILY MEDICINE | Facility: CLINIC | Age: 64
End: 2019-08-08
Payer: COMMERCIAL

## 2019-08-08 VITALS
HEIGHT: 65 IN | SYSTOLIC BLOOD PRESSURE: 122 MMHG | BODY MASS INDEX: 24.68 KG/M2 | TEMPERATURE: 98.2 F | DIASTOLIC BLOOD PRESSURE: 80 MMHG | HEART RATE: 70 BPM | OXYGEN SATURATION: 97 % | WEIGHT: 148.1 LBS

## 2019-08-08 DIAGNOSIS — M79.18 MUSCULOSKELETAL PAIN: Primary | ICD-10-CM

## 2019-08-08 DIAGNOSIS — M54.50 LEFT-SIDED LOW BACK PAIN WITHOUT SCIATICA, UNSPECIFIED CHRONICITY: ICD-10-CM

## 2019-08-08 PROCEDURE — 99213 OFFICE O/P EST LOW 20 MIN: CPT | Performed by: INTERNAL MEDICINE

## 2019-08-08 ASSESSMENT — MIFFLIN-ST. JEOR: SCORE: 1232.66

## 2019-08-08 NOTE — PROGRESS NOTES
Chief Complaint:   Neli Gregorio is a 64 year old female who presents to clinic today for the following health issues:    Post Urgent care clinic follow up of recent left side and low back pains      HPI:   Patient Neli Gregorio is a very pleasant 64 year old female with history of allergies who presents to Internal Medicine clinic today for post Urgent care clinic follow up of recent left side and low back pains. Regarding the patient's left side and low back pains, the patient reports improvement with the Flexeril muscle relaxant medications. Patient reports that she might have hurt her left side and low back with lifting her 40 lb grandkids. No chest pain, headaches, fever or chills at this time.        Current Medications:     Current Outpatient Medications   Medication Sig Dispense Refill     atorvastatin (LIPITOR) 40 MG tablet Take 1 tablet (40 mg) by mouth daily 90 tablet 3     BIOTIN PO Take 5,000 mcg by mouth daily       Cholecalciferol (VITAMIN D3 PO) Take 4,000 Units by mouth daily        cyclobenzaprine (FLEXERIL) 10 MG tablet Take 1 tablet (10 mg) by mouth 3 times daily as needed for muscle spasms 20 tablet 0     desonide (DESOWEN) 0.05 % external cream Apply sparingly to perineal area two times daily for 14 days. 15 g 3     EPINEPHrine (EPIPEN/ADRENACLICK/OR ANY BX GENERIC EQUIV) 0.3 MG/0.3ML injection 2-pack Inject 0.3 mLs (0.3 mg) into the muscle once as needed for anaphylaxis 2 each 1         Allergies:      Allergies   Allergen Reactions     Bee Venom Anaphylaxis     Cats Other (See Comments)     scratchy throat              Past Medical History:     Past Medical History:   Diagnosis Date     Abdominal pain      Atrophic vaginitis      Decreased libido 2015    trial of testosterone cream. Not interested in IM.     Hypercholesteraemia 2010     Hyperlipidemia     PATIENT STARTED LOVASTATIN 12/09     Insomnia, unspecified     Takes 1/4 tab of Ambien 5mg nightly.      Proctitis      Psoriasis      Dx originally by derm, mild disease. TCM prn.         Past Surgical History:     Past Surgical History:   Procedure Laterality Date     COLONOSCOPY       COLONOSCOPY N/A 2018    Procedure: INTRAOPERATIVE COLONOSCOPY;  Surgeon: Mary Grigsby MD;  Location: [facility]     HEMORRHOIDECTOMY INTERNAL N/A 2018    Procedure: HEMORRHOIDECTOMY INTERNAL;  Surgeon: Mary Grigsby MD;  Location: [facility] OR     MAMMOPLASTY AUGMENTATION  2007    Breast lift     ORTHOPEDIC SURGERY  2018    left shoulder repair         Family Medical History:     Family History   Problem Relation Age of Onset     Heart Disease Father      Hypertension Father      Hyperlipidemia Father      Alzheimer Disease Mother      Hyperlipidemia Mother      Osteoporosis Other         aunt     Osteoporosis Maternal Grandmother          Social History:     Social History     Socioeconomic History     Marital status:      Spouse name: Samuel     Number of children: 1     Years of education: Not on file     Highest education level: Not on file   Occupational History     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     Occupation:      Comment: [employer]   Social Needs     Financial resource strain: Not on file     Food insecurity:     Worry: Not on file     Inability: Not on file     Transportation needs:     Medical: Not on file     Non-medical: Not on file   Tobacco Use     Smoking status: Former Smoker     Last attempt to quit: 1985     Years since quittin.6     Smokeless tobacco: Never Used   Substance and Sexual Activity     Alcohol use: Yes     Alcohol/week: 0.0 oz     Comment: 1/day     Drug use: No     Sexual activity: Yes     Partners: Male     Birth control/protection: Post-menopausal   Lifestyle     Physical activity:     Days per week: Not on file     Minutes per session: Not on file     Stress: Not on file   Relationships     Social connections:     Talks on phone: Not on file     Gets together: Not on  "file     Attends Sikh service: Not on file     Active member of club or organization: Not on file     Attends meetings of clubs or organizations: Not on file     Relationship status: Not on file     Intimate partner violence:     Fear of current or ex partner: Not on file     Emotionally abused: Not on file     Physically abused: Not on file     Forced sexual activity: Not on file   Other Topics Concern     Parent/sibling w/ CABG, MI or angioplasty before 65F 55M? Not Asked   Social History Narrative     Not on file           Review of System:     Constitutional: Negative for fever or chills  Skin: Negative for rashes  Ears/Nose/Throat: Negative for nasal congestion, sore throat  Respiratory: No shortness of breath, dyspnea on exertion, cough, or hemoptysis  Cardiovascular: Negative for chest pain  Gastrointestinal: Negative for nausea, vomiting  Genitourinary: Negative for dysuria, hematuria  Musculoskeletal: positive for mechanical left side and low back pains  Neurologic: Negative for headaches  Psychiatric: Negative for depression, anxiety  Hematologic/Lymphatic/Immunologic: Negative  Endocrine: Negative  Behavioral: Negative for tobacco use       Physical Exam:   /80 (BP Location: Right arm, Patient Position: Sitting, Cuff Size: Adult Regular)   Pulse 70   Temp 98.2  F (36.8  C) (Tympanic)   Ht 1.651 m (5' 5\")   Wt 67.2 kg (148 lb 1.6 oz)   SpO2 97%   Breastfeeding? No   BMI 24.65 kg/m      GENERAL: alert and no distress  EYES: eyes grossly normal to inspection, and conjunctivae and sclerae normal  HENT: Normocephalic atraumatic. Nose and mouth without ulcers or lesions  NECK: supple  RESP: lungs clear to auscultation   CV: regular rate and rhythm, normal S1 S2  LYMPH: no peripheral edema   ABDOMEN: nondistended  MS: mild left side and low back pains noted concerning for musculoskeletal pains  SKIN: no suspicious lesions or rashes  NEURO: Alert & Oriented x 3.   PSYCH: mentation appears " normal, affect normal        Diagnostic Test Results:     Diagnostic Test Results:  Results for orders placed or performed in visit on 08/07/19   CBC with platelets differential   Result Value Ref Range    WBC 5.3 4.0 - 11.0 10e9/L    RBC Count 4.25 3.8 - 5.2 10e12/L    Hemoglobin 13.8 11.7 - 15.7 g/dL    Hematocrit 41.8 35.0 - 47.0 %    MCV 98 78 - 100 fl    MCH 32.5 26.5 - 33.0 pg    MCHC 33.0 31.5 - 36.5 g/dL    RDW 13.5 10.0 - 15.0 %    Platelet Count 151 150 - 450 10e9/L    % Neutrophils 51.0 %    % Lymphocytes 37.6 %    % Monocytes 9.5 %    % Eosinophils 1.7 %    % Basophils 0.2 %    Absolute Neutrophil 2.7 1.6 - 8.3 10e9/L    Absolute Lymphocytes 2.0 0.8 - 5.3 10e9/L    Absolute Monocytes 0.5 0.0 - 1.3 10e9/L    Absolute Eosinophils 0.1 0.0 - 0.7 10e9/L    Absolute Basophils 0.0 0.0 - 0.2 10e9/L    Diff Method Automated Method    *UA reflex to Microscopic and Culture ([place] and [facility] (except [place] and [place])   Result Value Ref Range    Color Urine Yellow     Appearance Urine Clear     Glucose Urine Negative NEG^Negative mg/dL    Bilirubin Urine Negative NEG^Negative    Ketones Urine Negative NEG^Negative mg/dL    Specific Gravity Urine 1.010 1.003 - 1.035    Blood Urine Trace (A) NEG^Negative    pH Urine 6.5 5.0 - 7.0 pH    Protein Albumin Urine Negative NEG^Negative mg/dL    Urobilinogen Urine 0.2 0.2 - 1.0 EU/dL    Nitrite Urine Negative NEG^Negative    Leukocyte Esterase Urine Negative NEG^Negative    Source Midstream Urine    Urine Microscopic   Result Value Ref Range    WBC Urine 0 - 5 OTO5^0 - 5 /HPF    RBC Urine O - 2 OTO2^O - 2 /HPF       ASSESSMENT/PLAN:       (M79.18) Musculoskeletal pain  (primary encounter diagnosis)  (M54.5) Left-sided low back pain without sciatica, unspecified chronicity  Comment: post Urgent care clinic follow up of recent musculoskeletal left side and low back pains. the patient reports improvement with the Flexeril muscle relaxant medications.  Plan:  continue current Flexeril muscle relaxant medication going forward.        Follow Up Plan:     Patient is instructed to return to Internal Medicine clinic for follow-up visit in 1 month.        Berna Goodson MD  Internal Medicine  [facility]

## 2019-09-09 ENCOUNTER — OFFICE VISIT (OUTPATIENT)
Dept: FAMILY MEDICINE | Facility: CLINIC | Age: 64
End: 2019-09-09
Payer: COMMERCIAL

## 2019-09-09 VITALS
BODY MASS INDEX: 25.01 KG/M2 | SYSTOLIC BLOOD PRESSURE: 124 MMHG | DIASTOLIC BLOOD PRESSURE: 76 MMHG | HEIGHT: 65 IN | TEMPERATURE: 97.4 F | OXYGEN SATURATION: 98 % | HEART RATE: 61 BPM | WEIGHT: 150.1 LBS

## 2019-09-09 DIAGNOSIS — R10.9 LEFT FLANK PAIN: Primary | ICD-10-CM

## 2019-09-09 DIAGNOSIS — R11.0 CHRONIC NAUSEA: ICD-10-CM

## 2019-09-09 DIAGNOSIS — R10.12 ABDOMINAL DISCOMFORT IN LEFT UPPER QUADRANT: ICD-10-CM

## 2019-09-09 PROCEDURE — 99214 OFFICE O/P EST MOD 30 MIN: CPT | Performed by: INTERNAL MEDICINE

## 2019-09-09 ASSESSMENT — MIFFLIN-ST. JEOR: SCORE: 1241.73

## 2019-09-09 NOTE — PROGRESS NOTES
Chief Complaint:       Neli Gregorio is a 64 year old female who presents to clinic today for the following health issues:    Follow up on left flank pains    Flank Pain    Duration:     Since: chronic           Specific cause: none    Description:      Location of pain: left flank     Character of pain: dull     Pain radiation: none    Intensity: mild    History:      Pain interferes with job: No     History of similar pain problems: Yes     Any previous MRI or X-rays: no     Therapies tried without relief: none    Alleviating factors:      Improved by: none    Precipitating factors:    Worsened by: none    Accompanying Signs & Symptoms: nausea, LUQ abdominal discomfort            Current Medications:     Current Outpatient Medications   Medication Sig Dispense Refill     atorvastatin (LIPITOR) 40 MG tablet Take 1 tablet (40 mg) by mouth daily 90 tablet 3     BIOTIN PO Take 5,000 mcg by mouth daily       Cholecalciferol (VITAMIN D3 PO) Take 4,000 Units by mouth daily        cyclobenzaprine (FLEXERIL) 10 MG tablet Take 1 tablet (10 mg) by mouth 3 times daily as needed for muscle spasms 20 tablet 0     desonide (DESOWEN) 0.05 % external cream Apply sparingly to perineal area two times daily for 14 days. 15 g 3     EPINEPHrine (EPIPEN/ADRENACLICK/OR ANY BX GENERIC EQUIV) 0.3 MG/0.3ML injection 2-pack Inject 0.3 mLs (0.3 mg) into the muscle once as needed for anaphylaxis 2 each 1         Allergies:      Allergies   Allergen Reactions     Bee Venom Anaphylaxis     Cats Other (See Comments)     scratchy throat              Past Medical History:     Past Medical History:   Diagnosis Date     Abdominal pain      Atrophic vaginitis      Decreased libido 2015    trial of testosterone cream. Not interested in IM.     Hypercholesteraemia 2010     Hyperlipidemia     PATIENT STARTED LOVASTATIN 12/09     Insomnia, unspecified     Takes 1/4 tab of Ambien 5mg nightly.      Proctitis      Psoriasis     Dx originally by derm, mild  disease. TCM prn.         Past Surgical History:     Past Surgical History:   Procedure Laterality Date     COLONOSCOPY       COLONOSCOPY N/A 2018    Procedure: INTRAOPERATIVE COLONOSCOPY;  Surgeon: Mary Grigsby MD;  Location: [facility] OR     HEMORRHOIDECTOMY INTERNAL N/A 2018    Procedure: HEMORRHOIDECTOMY INTERNAL;  Surgeon: Mary Grigsby MD;  Location: [facility] OR     MAMMOPLASTY AUGMENTATION  2007    Breast lift     ORTHOPEDIC SURGERY  2018    left shoulder repair         Family Medical History:     Family History   Problem Relation Age of Onset     Heart Disease Father      Hypertension Father      Hyperlipidemia Father      Alzheimer Disease Mother      Hyperlipidemia Mother      Osteoporosis Other         aunt     Osteoporosis Maternal Grandmother          Social History:     Social History     Socioeconomic History     Marital status:      Spouse name: Samuel     Number of children: 1     Years of education: Not on file     Highest education level: Not on file   Occupational History     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     Occupation:      Comment: [employer]   Social Needs     Financial resource strain: Not on file     Food insecurity:     Worry: Not on file     Inability: Not on file     Transportation needs:     Medical: Not on file     Non-medical: Not on file   Tobacco Use     Smoking status: Former Smoker     Last attempt to quit: 1985     Years since quittin.7     Smokeless tobacco: Never Used   Substance and Sexual Activity     Alcohol use: Yes     Alcohol/week: 0.0 oz     Comment: 1/day     Drug use: No     Sexual activity: Yes     Partners: Male     Birth control/protection: Post-menopausal   Lifestyle     Physical activity:     Days per week: Not on file     Minutes per session: Not on file     Stress: Not on file   Relationships     Social connections:     Talks on phone: Not on file     Gets together: Not on file     Attends Catholic  "service: Not on file     Active member of club or organization: Not on file     Attends meetings of clubs or organizations: Not on file     Relationship status: Not on file     Intimate partner violence:     Fear of current or ex partner: Not on file     Emotionally abused: Not on file     Physically abused: Not on file     Forced sexual activity: Not on file   Other Topics Concern     Parent/sibling w/ CABG, MI or angioplasty before 65F 55M? Not Asked   Social History Narrative     Not on file           Review of System:     Constitutional: Negative for fever or chills  Skin: Negative for rashes  Ears/Nose/Throat: Negative for nasal congestion, sore throat  Respiratory: No shortness of breath, dyspnea on exertion, cough, or hemoptysis  Cardiovascular: Negative for chest pain  Gastrointestinal: positive for nausea,LUQ abdominal discomfort of unclear etiology  Genitourinary: Negative for dysuria, hematuria  Musculoskeletal: Negative for myalgias  Neurologic: Negative for headaches  Psychiatric: Negative for depression, anxiety  Hematologic/Lymphatic/Immunologic: Negative  Endocrine: Negative  Behavioral: Negative for tobacco use       Physical Exam:   /76 (BP Location: Right arm, Patient Position: Sitting, Cuff Size: Adult Regular)   Pulse 61   Temp 97.4  F (36.3  C) (Oral)   Ht 1.651 m (5' 5\")   Wt 68.1 kg (150 lb 1.6 oz)   SpO2 98%   BMI 24.98 kg/m      GENERAL: alert and no distress  EYES: eyes grossly normal to inspection, and conjunctivae and sclerae normal  HENT: Normocephalic atraumatic. Nose and mouth without ulcers or lesions  NECK: supple  RESP: lungs clear to auscultation   CV: regular rate and rhythm, normal S1 S2  LYMPH: no peripheral edema   ABDOMEN: LUQ abdominal discomfort with left flank pains noted, no rebound or guarding, nondistended  MS: no gross musculoskeletal defects noted  SKIN: no suspicious lesions or rashes  NEURO: Alert & Oriented x 3.   PSYCH: mentation appears normal, affect " normal        Diagnostic Test Results:     Diagnostic Test Results:  Results for orders placed or performed in visit on 08/07/19   CBC with platelets differential   Result Value Ref Range    WBC 5.3 4.0 - 11.0 10e9/L    RBC Count 4.25 3.8 - 5.2 10e12/L    Hemoglobin 13.8 11.7 - 15.7 g/dL    Hematocrit 41.8 35.0 - 47.0 %    MCV 98 78 - 100 fl    MCH 32.5 26.5 - 33.0 pg    MCHC 33.0 31.5 - 36.5 g/dL    RDW 13.5 10.0 - 15.0 %    Platelet Count 151 150 - 450 10e9/L    % Neutrophils 51.0 %    % Lymphocytes 37.6 %    % Monocytes 9.5 %    % Eosinophils 1.7 %    % Basophils 0.2 %    Absolute Neutrophil 2.7 1.6 - 8.3 10e9/L    Absolute Lymphocytes 2.0 0.8 - 5.3 10e9/L    Absolute Monocytes 0.5 0.0 - 1.3 10e9/L    Absolute Eosinophils 0.1 0.0 - 0.7 10e9/L    Absolute Basophils 0.0 0.0 - 0.2 10e9/L    Diff Method Automated Method    *UA reflex to Microscopic and Culture ([place] and [facility] (except [place] and [place])   Result Value Ref Range    Color Urine Yellow     Appearance Urine Clear     Glucose Urine Negative NEG^Negative mg/dL    Bilirubin Urine Negative NEG^Negative    Ketones Urine Negative NEG^Negative mg/dL    Specific Gravity Urine 1.010 1.003 - 1.035    Blood Urine Trace (A) NEG^Negative    pH Urine 6.5 5.0 - 7.0 pH    Protein Albumin Urine Negative NEG^Negative mg/dL    Urobilinogen Urine 0.2 0.2 - 1.0 EU/dL    Nitrite Urine Negative NEG^Negative    Leukocyte Esterase Urine Negative NEG^Negative    Source Midstream Urine    Urine Microscopic   Result Value Ref Range    WBC Urine 0 - 5 OTO5^0 - 5 /HPF    RBC Urine O - 2 OTO2^O - 2 /HPF       ASSESSMENT/PLAN:     (R10.9) Left flank pain  (primary encounter diagnosis)  (R10.12) Abdominal discomfort in left upper quadrant  (R11.0) Chronic nausea  Comment: LUQ abdominal discomfort with chronic nausea of unclear etiology  Plan: GASTROENTEROLOGY ADULT REF CONSULT ONLY, CT         Abdomen Pelvis w Contrast    Follow Up Plan:     Patient is instructed to  return to Internal Medicine clinic for follow-up visit after the CT scan is completed to review the CT result.        Berna Goodson MD  Internal Medicine  [facility]

## 2019-09-11 ENCOUNTER — MYC MEDICAL ADVICE (OUTPATIENT)
Dept: FAMILY MEDICINE | Facility: CLINIC | Age: 64
End: 2019-09-11

## 2019-09-11 ENCOUNTER — HOSPITAL ENCOUNTER (OUTPATIENT)
Dept: CT IMAGING | Facility: CLINIC | Age: 64
Discharge: HOME | End: 2019-09-11
Attending: INTERNAL MEDICINE | Admitting: INTERNAL MEDICINE
Payer: COMMERCIAL

## 2019-09-11 DIAGNOSIS — R11.0 CHRONIC NAUSEA: ICD-10-CM

## 2019-09-11 DIAGNOSIS — R10.12 ABDOMINAL DISCOMFORT IN LEFT UPPER QUADRANT: ICD-10-CM

## 2019-09-11 PROCEDURE — 74177 CT ABD & PELVIS W/CONTRAST: CPT

## 2019-09-11 PROCEDURE — 25000125 ZZHC RX 250: Performed by: INTERNAL MEDICINE

## 2019-09-11 PROCEDURE — 25000128 H RX IP 250 OP 636: Performed by: INTERNAL MEDICINE

## 2019-09-11 RX ORDER — IOPAMIDOL 755 MG/ML
74 INJECTION, SOLUTION INTRAVASCULAR ONCE
Status: COMPLETED | OUTPATIENT
Start: 2019-09-11 | End: 2019-09-11

## 2019-09-11 RX ADMIN — IOPAMIDOL 74 ML: 755 INJECTION, SOLUTION INTRAVENOUS at 16:12

## 2019-09-11 RX ADMIN — SODIUM CHLORIDE 61 ML: 9 INJECTION, SOLUTION INTRAVENOUS at 16:09

## 2019-09-13 ENCOUNTER — TELEPHONE (OUTPATIENT)
Dept: FAMILY MEDICINE | Facility: CLINIC | Age: 64
End: 2019-09-13

## 2019-09-13 NOTE — TELEPHONE ENCOUNTER
Per his Rapid Mobile message from 9/11/19 he called her about it.  Ok to release it to her    Guru Mccord M.D.

## 2019-09-13 NOTE — TELEPHONE ENCOUNTER
Pt would like  Imaging results reviewed and released so that she can view them in [employer]. Please call pt to advise.  Dr. Goodson is out of the office today.     Thank you,   Lang GARCIA   [place] Scheduling  [phone]

## 2019-09-13 NOTE — TELEPHONE ENCOUNTER
CT results released.  Viewable to patient in [employer].     Called to inform patient.     Miriam RUDD RN,BSN

## 2019-10-02 ENCOUNTER — HEALTH MAINTENANCE LETTER (OUTPATIENT)
Age: 64
End: 2019-10-02

## 2019-11-14 ENCOUNTER — TELEPHONE (OUTPATIENT)
Dept: OBGYN | Facility: CLINIC | Age: 64
End: 2019-11-14

## 2019-11-14 NOTE — TELEPHONE ENCOUNTER
Called and left detailed vm with Dr Riley's response as written below. Encouraged to call and speak with nurse if wishes to proceed with Trazodone or further questions.  Arlen Sin RN on 11/14/2019 at 1:24 PM

## 2019-11-14 NOTE — TELEPHONE ENCOUNTER
Pt is having some family situation/issues and having trouble falling asleep.  Used Zolpidem years ago and now asking for a refill on that medication. Is unsure what dose she used in the past.    Informed pt that Dr Das's surgery day and not in office - requires print out and sign. Will see what Dr das's response is and call pt.      Last annual 2/20/19    Arlen Sin RN on 11/14/2019 at 12:52 PM

## 2020-02-17 ENCOUNTER — NURSE TRIAGE (OUTPATIENT)
Dept: NURSING | Facility: CLINIC | Age: 65
End: 2020-02-17

## 2020-02-18 NOTE — TELEPHONE ENCOUNTER
Patient gave writer permission to speak to her  about her health. Daughter-in-law DX with influenza A and Neli spent time with her and her family this last week end. Neli calling to inquire about getting Tamiflu.  Writer called on call provider Dr. Lozano.  Per Dr. Lozano Neli does not meet risk factors to get Tamiflu or have symptoms at this time.  Called patient back to let her know  Mary Urbano RN, [facility]        Additional Information    Negative: Patient sounds very sick or weak to the triager    Negative: Fever present > 3 days (72 hours)    Negative: [1] Influenza EXPOSURE (Close Contact) within last 72 hours (3 days) AND [2] exposed person is HIGH RISK (e.g., age > 64 years, pregnant, HIV+, chronic medical condition)    Negative: [1] Influenza EXPOSURE within last 72 hours (3 days) AND [2] exposed person is a health care worker, public health worker, or  (EMS)    Negative: [1] Influenza EXPOSURE within last 72 hours (3 days) AND [2] NOT HIGH RISK AND [3] strongly requests antiviral medication    Negative: Needs a flu shot    Negative: [1] Influenza EXPOSURE  (Close Contact) within last 7 days AND [2] exposed person is HIGH RISK (e.g., age > 64 years, pregnant, HIV+, chronic medical condition) AND [3] NO respiratory symptoms    Negative: [1]Influenza EXPOSURE  (Close Contact) within last 7 days AND [2] NO respiratory symptoms    Negative: [1] Influenza EXPOSURE > 7 days ago AND [2] NO respiratory symptoms    Negative: Influenza, questions about    Negative: Influenza prevention, questions about    Negative: Influenza internet resources, questions about    Negative: Influenza and travel, questions about    Influenza and antiviral drugs, questions about    Protocols used: INFLUENZA EXPOSURE-A-

## 2020-02-20 NOTE — PROGRESS NOTES
Neli is a 64 year old  female who presents for annual exam.     Besides routine health maintenance, she has no other health concerns today .    HPI:  The patient's PCP is  Guru Mccord MD.    Works for [employer]-     [employer]    No concerns  Needs refill epi pen and flu shot and desonide      GYNECOLOGIC HISTORY:    No LMP recorded. Patient is postmenopausal.    Her current contraception method is: Post felicia.  She  reports that she quit smoking about 35 years ago. She has never used smokeless tobacco.    Patient is sexually active.  STD testing offered?  Declined  Last PHQ-9 score on record =   PHQ-9 SCORE 2019   PHQ-9 Total Score 0     Last GAD7 score on record =   GRIFFIN-7 SCORE 2019   Total Score 0     Alcohol Score = 4     HEALTH MAINTENANCE:  Cholesterol:   Cholesterol   Date Value Ref Range Status   2019 168 <200 mg/dL Final   2017 172 <200 mg/dL Final      Last Mammo: One year ago, Result: Normal, Next Mammo: Today   Pap:   Lab Results   Component Value Date    PAP NIL 2019      Colonoscopy:  2018, Result: Normal, Next Colonoscopy: 3 years.  Dexa:  2013    Health maintenance updated:  yes    HISTORY:  OB History    Para Term  AB Living   2 1 1 0 1 1   SAB TAB Ectopic Multiple Live Births   0 1 0 0 1      # Outcome Date GA Lbr Delonte/2nd Weight Sex Delivery Anes PTL Lv   2 Term 85 39w0d  3.345 kg (7 lb 6 oz) M Vag-Vacuum   MAX   1 TAB                Patient Active Problem List   Diagnosis     Hyperlipidemia with target LDL less than 100     Impingement syndrome of shoulder region, right     Past Surgical History:   Procedure Laterality Date     COLONOSCOPY       COLONOSCOPY N/A 2018    Procedure: INTRAOPERATIVE COLONOSCOPY;  Surgeon: Mary Grigsby MD;  Location:  OR     HEMORRHOIDECTOMY INTERNAL N/A 2018    Procedure: HEMORRHOIDECTOMY INTERNAL;  Surgeon: Mary Grigsby MD;  Location:  OR      MAMMOPLASTY AUGMENTATION  2007    Breast lift     ORTHOPEDIC SURGERY  2018    left shoulder repair      Social History     Tobacco Use     Smoking status: Former Smoker     Last attempt to quit: 1985     Years since quittin.1     Smokeless tobacco: Never Used   Substance Use Topics     Alcohol use: Yes     Alcohol/week: 0.0 standard drinks     Comment: 1/day      Problem (# of Occurrences) Relation (Name,Age of Onset)    Alzheimer Disease (1) Mother    Heart Disease (1) Father    Hyperlipidemia (2) Father, Mother    Hypertension (1) Father    Osteoporosis (2) Other: aunt, Maternal Grandmother            Current Outpatient Medications   Medication Sig     atorvastatin (LIPITOR) 40 MG tablet Take 1 tablet (40 mg) by mouth daily     BIOTIN PO Take 5,000 mcg by mouth daily     Cholecalciferol (VITAMIN D3 PO) Take 4,000 Units by mouth daily      cyclobenzaprine (FLEXERIL) 10 MG tablet Take 1 tablet (10 mg) by mouth 3 times daily as needed for muscle spasms     desonide (DESOWEN) 0.05 % external cream Apply sparingly to perineal area two times daily for 14 days.     EPINEPHrine (EPIPEN/ADRENACLICK/OR ANY BX GENERIC EQUIV) 0.3 MG/0.3ML injection 2-pack Inject 0.3 mLs (0.3 mg) into the muscle once as needed for anaphylaxis     No current facility-administered medications for this visit.      Allergies   Allergen Reactions     Bee Venom Anaphylaxis     Cats Other (See Comments)     scratchy throat         Past medical, surgical, social and family histories were reviewed and updated in EPIC.    ROS:   12 point review of systems negative other than symptoms noted below or in the HPI.  Musculoskeletal: Joint Pain  No urinary frequency or dysuria, bladder or kidney problems    EXAM:  There were no vitals taken for this visit.   BMI: There is no height or weight on file to calculate BMI.    PHYSICAL EXAM:  Constitutional:   Appearance: Well nourished, well developed, alert, in no acute distress  Neck:  Lymph Nodes:  No  lymphadenopathy present    Thyroid:  Gland size normal, nontender, no nodules or masses present  on palpation  Chest:  Respiratory Effort:  Breathing unlabored  Cardiovascular:    Heart: Auscultation:  Regular rate, normal rhythm, no murmurs present  Breasts: Inspection of Breasts:  No lymphadenopathy present., Palpation of Breasts and Axillae:  No masses present on palpation, no breast tenderness., Axillary Lymph Nodes:  No lymphadenopathy present. and No nodularity, asymmetry or nipple discharge bilaterally.  Gastrointestinal:   Abdominal Examination:  Abdomen nontender to palpation, tone normal without rigidity or guarding, no masses present, umbilicus without lesions   Liver and Spleen:  No hepatomegaly present, liver nontender to palpation    Hernias:  No hernias present  Lymphatic: Lymph Nodes:  No other lymphadenopathy present  Skin:  General Inspection:  No rashes present, no lesions present, no areas of  discoloration  Neurologic:    Mental Status:  Oriented X3.  Normal strength and tone, sensory exam                grossly normal, mentation intact and speech normal.    Psychiatric:   Mentation appears normal and affect normal/bright.         Pelvic Exam:  External Genitalia:     Normal appearance for age, no discharge present, no tenderness present, no inflammatory lesions present, color normal  Vagina:     Normal vaginal vault without central or paravaginal defects, no discharge present, no inflammatory lesions present, no masses present  Bladder:     Nontender to palpation  Urethra:   Urethral Body:  Urethra palpation normal, urethra structural support normal   Urethral Meatus:  No erythema or lesions present  Cervix:     Appearance healthy, no lesions present, nontender to palpation, no bleeding present  Uterus:     Uterus: firm, normal sized and nontender, midplane in position.   Adnexa:     No adnexal tenderness present, no adnexal masses present  Perineum:     Perineum within normal limits, no  evidence of trauma, no rashes or skin lesions present  Anus:     Anus within normal limits, no hemorrhoids present  Inguinal Lymph Nodes:     No lymphadenopathy present  Pubic Hair:     Normal pubic hair distribution for age  Genitalia and Groin:     No rashes present, no lesions present, no areas of discoloration, no masses present      COUNSELING:   Reviewed preventive health counseling, as reflected in patient instructions       Regular exercise       Healthy diet/nutrition    BMI: There is no height or weight on file to calculate BMI.      ASSESSMENT:  64 year old female with satisfactory annual exam.    ICD-10-CM    1. Encounter for gynecological examination without abnormal finding Z01.419        PLAN:  Flu shot  cmp  Lipid  Refill desonide and epi pen  Mammogram  Not due for pap this years      Nuvia Riley MD

## 2020-02-25 ENCOUNTER — ANCILLARY PROCEDURE (OUTPATIENT)
Dept: MAMMOGRAPHY | Facility: CLINIC | Age: 65
End: 2020-02-25
Attending: OBSTETRICS & GYNECOLOGY
Payer: COMMERCIAL

## 2020-02-25 ENCOUNTER — OFFICE VISIT (OUTPATIENT)
Dept: OBGYN | Facility: CLINIC | Age: 65
End: 2020-02-25
Attending: OBSTETRICS & GYNECOLOGY
Payer: COMMERCIAL

## 2020-02-25 VITALS
BODY MASS INDEX: 24.83 KG/M2 | DIASTOLIC BLOOD PRESSURE: 66 MMHG | SYSTOLIC BLOOD PRESSURE: 108 MMHG | WEIGHT: 149 LBS | HEIGHT: 65 IN

## 2020-02-25 DIAGNOSIS — Z23 NEED FOR PROPHYLACTIC VACCINATION AND INOCULATION AGAINST INFLUENZA: ICD-10-CM

## 2020-02-25 DIAGNOSIS — E78.00 HYPERCHOLESTEROLEMIA: ICD-10-CM

## 2020-02-25 DIAGNOSIS — Z12.31 VISIT FOR SCREENING MAMMOGRAM: ICD-10-CM

## 2020-02-25 DIAGNOSIS — Z91.030 ALLERGY TO HONEY BEE VENOM: ICD-10-CM

## 2020-02-25 DIAGNOSIS — R23.8 SKIN IRRITATION: ICD-10-CM

## 2020-02-25 DIAGNOSIS — Z13.6 SCREENING FOR CARDIOVASCULAR CONDITION: ICD-10-CM

## 2020-02-25 DIAGNOSIS — Z13.228 SCREENING FOR METABOLIC DISORDER: ICD-10-CM

## 2020-02-25 DIAGNOSIS — Z01.419 ENCOUNTER FOR GYNECOLOGICAL EXAMINATION WITHOUT ABNORMAL FINDING: Primary | ICD-10-CM

## 2020-02-25 LAB
ALBUMIN SERPL-MCNC: 4.1 G/DL (ref 3.4–5)
ALP SERPL-CCNC: 54 U/L (ref 40–150)
ALT SERPL W P-5'-P-CCNC: 29 U/L (ref 0–50)
ANION GAP SERPL CALCULATED.3IONS-SCNC: 5 MMOL/L (ref 3–14)
AST SERPL W P-5'-P-CCNC: 17 U/L (ref 0–45)
BILIRUB SERPL-MCNC: 0.6 MG/DL (ref 0.2–1.3)
BUN SERPL-MCNC: 13 MG/DL (ref 7–30)
CALCIUM SERPL-MCNC: 9.1 MG/DL (ref 8.5–10.1)
CHLORIDE SERPL-SCNC: 104 MMOL/L (ref 94–109)
CHOLEST SERPL-MCNC: 184 MG/DL
CO2 SERPL-SCNC: 29 MMOL/L (ref 20–32)
CREAT SERPL-MCNC: 0.66 MG/DL (ref 0.52–1.04)
GFR SERPL CREATININE-BSD FRML MDRD: >90 ML/MIN/{1.73_M2}
GLUCOSE SERPL-MCNC: 92 MG/DL (ref 70–99)
HDLC SERPL-MCNC: 67 MG/DL
LDLC SERPL CALC-MCNC: 99 MG/DL
NONHDLC SERPL-MCNC: 117 MG/DL
POTASSIUM SERPL-SCNC: 3.7 MMOL/L (ref 3.4–5.3)
PROT SERPL-MCNC: 7.1 G/DL (ref 6.8–8.8)
SODIUM SERPL-SCNC: 138 MMOL/L (ref 133–144)
TRIGL SERPL-MCNC: 91 MG/DL

## 2020-02-25 PROCEDURE — 77067 SCR MAMMO BI INCL CAD: CPT | Mod: TC

## 2020-02-25 PROCEDURE — 90682 RIV4 VACC RECOMBINANT DNA IM: CPT | Performed by: OBSTETRICS & GYNECOLOGY

## 2020-02-25 PROCEDURE — 80053 COMPREHEN METABOLIC PANEL: CPT | Performed by: OBSTETRICS & GYNECOLOGY

## 2020-02-25 PROCEDURE — 80061 LIPID PANEL: CPT | Performed by: OBSTETRICS & GYNECOLOGY

## 2020-02-25 PROCEDURE — 99396 PREV VISIT EST AGE 40-64: CPT | Mod: 25 | Performed by: OBSTETRICS & GYNECOLOGY

## 2020-02-25 PROCEDURE — 36415 COLL VENOUS BLD VENIPUNCTURE: CPT | Performed by: OBSTETRICS & GYNECOLOGY

## 2020-02-25 PROCEDURE — 90471 IMMUNIZATION ADMIN: CPT | Performed by: OBSTETRICS & GYNECOLOGY

## 2020-02-25 PROCEDURE — 77063 BREAST TOMOSYNTHESIS BI: CPT | Mod: TC

## 2020-02-25 RX ORDER — EPINEPHRINE 0.3 MG/.3ML
0.3 INJECTION SUBCUTANEOUS
Qty: 2 EACH | Refills: 1 | Status: SHIPPED | OUTPATIENT
Start: 2020-02-25 | End: 2021-03-18

## 2020-02-25 RX ORDER — DESONIDE 0.5 MG/G
CREAM TOPICAL
Qty: 15 G | Refills: 3 | Status: SHIPPED | OUTPATIENT
Start: 2020-02-25 | End: 2021-07-28

## 2020-02-25 ASSESSMENT — PATIENT HEALTH QUESTIONNAIRE - PHQ9
SUM OF ALL RESPONSES TO PHQ QUESTIONS 1-9: 0
5. POOR APPETITE OR OVEREATING: NOT AT ALL

## 2020-02-25 ASSESSMENT — ANXIETY QUESTIONNAIRES
3. WORRYING TOO MUCH ABOUT DIFFERENT THINGS: NOT AT ALL
1. FEELING NERVOUS, ANXIOUS, OR ON EDGE: NOT AT ALL
2. NOT BEING ABLE TO STOP OR CONTROL WORRYING: NOT AT ALL
7. FEELING AFRAID AS IF SOMETHING AWFUL MIGHT HAPPEN: NOT AT ALL
5. BEING SO RESTLESS THAT IT IS HARD TO SIT STILL: NOT AT ALL
6. BECOMING EASILY ANNOYED OR IRRITABLE: NOT AT ALL
IF YOU CHECKED OFF ANY PROBLEMS ON THIS QUESTIONNAIRE, HOW DIFFICULT HAVE THESE PROBLEMS MADE IT FOR YOU TO DO YOUR WORK, TAKE CARE OF THINGS AT HOME, OR GET ALONG WITH OTHER PEOPLE: NOT DIFFICULT AT ALL
GAD7 TOTAL SCORE: 0

## 2020-02-25 ASSESSMENT — MIFFLIN-ST. JEOR: SCORE: 1232.77

## 2020-02-26 RX ORDER — ATORVASTATIN CALCIUM 40 MG/1
TABLET, FILM COATED ORAL
Qty: 90 TABLET | Refills: 2 | Status: SHIPPED | OUTPATIENT
Start: 2020-02-26 | End: 2021-03-18

## 2020-02-26 ASSESSMENT — ANXIETY QUESTIONNAIRES: GAD7 TOTAL SCORE: 0

## 2020-02-26 NOTE — TELEPHONE ENCOUNTER
"Requested Prescriptions   Pending Prescriptions Disp Refills     atorvastatin (LIPITOR) 40 MG tablet [Pharmacy Med Name: Atorvastatin Calcium Oral Tablet 40 MG] 90 tablet 2     Sig: TAKE ONE TABLET BY MOUTH ONE TIME DAILY       Statins Protocol Passed - 2/25/2020 10:25 PM        Passed - LDL on file in past 12 months     Recent Labs   Lab Test 02/25/20  0859   LDL 99             Passed - No abnormal creatine kinase in past 12 months     No lab results found.             Passed - Recent (12 mo) or future (30 days) visit within the authorizing provider's specialty     Patient has had an office visit with the authorizing provider or a provider within the authorizing providers department within the previous 12 mos or has a future within next 30 days. See \"Patient Info\" tab in inbasket, or \"Choose Columns\" in Meds & Orders section of the refill encounter.              Passed - Medication is active on med list        Passed - Patient is age 18 or older        Passed - No active pregnancy on record        Passed - No positive pregnancy test in past 12 months        Last Written Prescription Date:  2/20/19  Last Fill Quantity: 90,  # refills: 3   Last office visit: 2/25/2020 with prescribing provider:  Dr Riley   Future Office Visit:  None  Prescription approved per [facility] Refill Protocol.  Lia Martinez RN on 2/26/2020 at 10:58 AM    "

## 2020-06-18 ENCOUNTER — TRANSFERRED RECORDS (OUTPATIENT)
Dept: HEALTH INFORMATION MANAGEMENT | Facility: CLINIC | Age: 65
End: 2020-06-18

## 2020-07-15 ENCOUNTER — TRANSFERRED RECORDS (OUTPATIENT)
Dept: HEALTH INFORMATION MANAGEMENT | Facility: CLINIC | Age: 65
End: 2020-07-15

## 2020-10-28 ENCOUNTER — OFFICE VISIT (OUTPATIENT)
Dept: FAMILY MEDICINE | Facility: CLINIC | Age: 65
End: 2020-10-28
Payer: COMMERCIAL

## 2020-10-28 VITALS
BODY MASS INDEX: 25.01 KG/M2 | DIASTOLIC BLOOD PRESSURE: 81 MMHG | SYSTOLIC BLOOD PRESSURE: 135 MMHG | HEART RATE: 62 BPM | OXYGEN SATURATION: 100 % | TEMPERATURE: 96.9 F | HEIGHT: 65 IN | WEIGHT: 150.1 LBS

## 2020-10-28 DIAGNOSIS — G89.29 CHRONIC RIGHT SHOULDER PAIN: ICD-10-CM

## 2020-10-28 DIAGNOSIS — Z01.810 PRE-OPERATIVE CARDIOVASCULAR EXAMINATION: Primary | ICD-10-CM

## 2020-10-28 DIAGNOSIS — Z23 NEED FOR PROPHYLACTIC VACCINATION AND INOCULATION AGAINST INFLUENZA: ICD-10-CM

## 2020-10-28 DIAGNOSIS — M25.511 CHRONIC RIGHT SHOULDER PAIN: ICD-10-CM

## 2020-10-28 LAB
HGB BLD-MCNC: 13.9 G/DL (ref 11.7–15.7)
PLATELET # BLD AUTO: 175 10E9/L (ref 150–450)

## 2020-10-28 PROCEDURE — 99214 OFFICE O/P EST MOD 30 MIN: CPT | Mod: 25 | Performed by: INTERNAL MEDICINE

## 2020-10-28 PROCEDURE — 85049 AUTOMATED PLATELET COUNT: CPT | Performed by: INTERNAL MEDICINE

## 2020-10-28 PROCEDURE — 90471 IMMUNIZATION ADMIN: CPT | Performed by: INTERNAL MEDICINE

## 2020-10-28 PROCEDURE — 85018 HEMOGLOBIN: CPT | Performed by: INTERNAL MEDICINE

## 2020-10-28 PROCEDURE — 36415 COLL VENOUS BLD VENIPUNCTURE: CPT | Performed by: INTERNAL MEDICINE

## 2020-10-28 PROCEDURE — 90682 RIV4 VACC RECOMBINANT DNA IM: CPT | Performed by: INTERNAL MEDICINE

## 2020-10-28 RX ORDER — ZINC GLUCONATE 50 MG
50 TABLET ORAL DAILY
COMMUNITY

## 2020-10-28 ASSESSMENT — MIFFLIN-ST. JEOR: SCORE: 1232.76

## 2020-10-28 NOTE — PROGRESS NOTES
[place] [address] [facility] [address]  Phone: [phone]  Primary Provider: Berna Goodson  Pre-op Performing Provider: BERNA GOODSON    PREOPERATIVE EVALUATION:  Today's date: 10/28/2020        Surgical Information:  Surgery/Procedure: Shoulder - right   Surgery Location: [facility]   Surgeon: Dr. Thanh Barajas   Surgery Date: 11/5/2020  Time of Surgery: TBD   Where patient plans to recover: At home with family  Fax number for surgical facility: [phone]    Type of Anesthesia Anticipated: to be determined    Subjective     HPI related to upcoming procedure: patient Neli Gregorio is a 65 year old female who presents for a preoperative evaluation for upcoming ortho right shoulder surgery for treatment of chronic right shoulder pains. Patient denies any known CAD, CVA or Type 2 Diabetes. Patient denies any known allergies to anesthesia agents. Patient denies any chest pain, headaches, fever or chills at this time.        Preop Questions 10/27/2020   1. Have you ever had a heart attack or stroke? No   2. Have you ever had surgery on your heart or blood vessels, such as a stent placement, a coronary artery bypass, or surgery on an artery in your head, neck, heart, or legs? No   3. Do you have chest pain with activity? No   4. Do you have a history of  heart failure? No   5. Do you currently have a cold, bronchitis or symptoms of other infection? No   6. Do you have a cough, shortness of breath, or wheezing? No   7. Do you or anyone in your family have previous history of blood clots? No   8. Do you or does anyone in your family have a serious bleeding problem such as prolonged bleeding following surgeries or cuts? No   9. Have you ever had problems with anemia or been told to take iron pills? No   10. Have you had any abnormal blood loss such as black, tarry or bloody stools, or abnormal vaginal bleeding? No   11. Have you ever had a blood transfusion?  No   12. Are you willing to have a blood transfusion if it is medically needed before, during, or after your surgery? Yes   13. Have you or any of your relatives ever had problems with anesthesia? No   14. Do you have sleep apnea, excessive snoring or daytime drowsiness? No   15. Do you have any artifical heart valves or other implanted medical devices like a pacemaker, defibrillator, or continuous glucose monitor? No   16. Do you have artificial joints? No   17. Are you allergic to latex? No   18. Is there any chance that you may be pregnant? -       Health Care Directive:  Patient does not have a Health Care Directive or Living Will: Discussed advance care planning with patient; information given to patient to review.        Review of Systems  Constitutional, neuro, ENT, endocrine, pulmonary, cardiac, gastrointestinal, genitourinary, musculoskeletal, integument and psychiatric systems are negative, except as otherwise noted.    Patient Active Problem List    Diagnosis Date Noted     Impingement syndrome of shoulder region, right 03/21/2018     Priority: Medium     Hyperlipidemia with target LDL less than 100 07/16/2015     Priority: Medium     Diagnosis updated by automated process. Provider to review and confirm.        Past Medical History:   Diagnosis Date     Abdominal pain      Atrophic vaginitis      Decreased libido 2015    trial of testosterone cream. Not interested in IM.     Hypercholesteraemia 2010     Hyperlipidemia     PATIENT STARTED LOVASTATIN 12/09     Insomnia, unspecified     Takes 1/4 tab of Ambien 5mg nightly.      Proctitis      Psoriasis     Dx originally by derm, mild disease. TCM prn.     Past Surgical History:   Procedure Laterality Date     COLONOSCOPY       COLONOSCOPY N/A 12/21/2018    Procedure: INTRAOPERATIVE COLONOSCOPY;  Surgeon: Mary Grigsby MD;  Location:  OR     EYE SURGERY  2004    lasik     HEMORRHOIDECTOMY INTERNAL N/A 12/21/2018    Procedure: HEMORRHOIDECTOMY  "INTERNAL;  Surgeon: Mary Grigsby MD;  Location: [facility] OR     MAMMOPLASTY AUGMENTATION  2007    Breast lift     ORTHOPEDIC SURGERY  2018    left shoulder repair     Current Outpatient Medications   Medication Sig Dispense Refill     atorvastatin (LIPITOR) 40 MG tablet TAKE ONE TABLET BY MOUTH ONE TIME DAILY  90 tablet 2     BIOTIN PO Take 5,000 mcg by mouth daily       Cholecalciferol (VITAMIN D3 PO) Take 4,000 Units by mouth daily        cyclobenzaprine (FLEXERIL) 10 MG tablet Take 1 tablet (10 mg) by mouth 3 times daily as needed for muscle spasms 20 tablet 0     desonide (DESOWEN) 0.05 % external cream Apply sparingly to perineal area two times daily for 14 days. 15 g 3     EPINEPHrine (ANY BX GENERIC EQUIV) 0.3 MG/0.3ML injection 2-pack Inject 0.3 mLs (0.3 mg) into the muscle once as needed for anaphylaxis 2 each 1     zinc gluconate 50 MG tablet Take 50 mg by mouth daily         Allergies   Allergen Reactions     Bee Venom Anaphylaxis     Cats Other (See Comments)     scratchy throat          Social History     Tobacco Use     Smoking status: Former Smoker     Packs/day: 1.00     Years: 10.00     Pack years: 10.00     Types: Cigarettes     Start date: 1975     Quit date: 1985     Years since quittin.8     Smokeless tobacco: Never Used   Substance Use Topics     Alcohol use: Yes     Alcohol/week: 0.0 standard drinks     Comment: 1/day     Family History   Problem Relation Age of Onset     Heart Disease Father      Hypertension Father      Hyperlipidemia Father      Alzheimer Disease Mother      Hyperlipidemia Mother      Osteoporosis Other         aunt     Osteoporosis Maternal Grandmother      History   Drug Use No         Objective     /81 (BP Location: Left arm, Patient Position: Sitting, Cuff Size: Adult Regular)   Pulse 62   Temp 96.9  F (36.1  C) (Temporal)   Ht 1.645 m (5' 4.75\")   Wt 68.1 kg (150 lb 1.6 oz)   SpO2 100%   Breastfeeding No   BMI 25.17 kg/m      Physical " Exam    GENERAL APPEARANCE: alert and no distress     EYES: EOMI, PERRL     HENT: ear canals and TM's normal and nose and mouth without ulcers or lesions     NECK: no adenopathy, no asymmetry, masses, or scars and thyroid normal to palpation     RESP: lungs clear to auscultation - no rales, rhonchi or wheezes     CV: regular rates and rhythm, normal S1 S2, no S3 or S4 and no murmur, click or rub     ABDOMEN:  soft, nontender, no HSM or masses and bowel sounds normal     MS: right shoulder pains present.     SKIN: no suspicious lesions or rashes     NEURO: Normal strength and tone, sensory exam grossly normal, mentation intact and speech normal     PSYCH: mentation appears normal. and affect normal/bright     LYMPHATICS: No cervical adenopathy    Recent Labs   Lab Test 02/25/20  0859 08/07/19  1805 02/20/19  0841   HGB  --  13.8  --    PLT  --  151  --      --  137   POTASSIUM 3.7  --  3.6   CR 0.66  --  0.66        Diagnostics:  Recent Results (from the past 24 hour(s))   Hemoglobin    Collection Time: 10/28/20  7:51 AM   Result Value Ref Range    Hemoglobin 13.9 11.7 - 15.7 g/dL   Platelet count    Collection Time: 10/28/20  7:51 AM   Result Value Ref Range    Platelet Count 175 150 - 450 10e9/L      No EKG required, no history of coronary heart disease, significant arrhythmia, peripheral arterial disease or other structural heart disease.    Revised Cardiac Risk Index (RCRI):  The patient has the following serious cardiovascular risks for perioperative complications:   - No serious cardiac risks = 0 points     RCRI Interpretation: 0 points: Class I (very low risk - 0.4% complication rate)         Assessment & Plan   The proposed surgical procedure is considered INTERMEDIATE risk.    Pre-operative cardiovascular examination  Chronic right shoulder pain  - Hemoglobin  - Platelet count       Need for prophylactic vaccination and inoculation against influenza  - INFLUENZA QUAD, RECOMBINANT, P-FREE (RIV4)  (FLUBLOCK) [72463]  - Vaccine Administration, Initial [64280]        Risks and Recommendations:  The patient has the following additional risks and recommendations for perioperative complications:   - No identified additional risk factors other than previously addressed    Medication Instructions:  Patient is to take all scheduled medications on the day of surgery    RECOMMENDATION:  APPROVAL GIVEN to proceed with proposed procedure, without further diagnostic evaluation.    Signed Electronically by: Berna Goodson MD    Copy of this evaluation report is provided to requesting physician.    [facility] Preop Guidelines    Revised Cardiac Risk Index

## 2020-10-28 NOTE — LETTER
October 28, 2020      Neli Gregorio  [address] [place] [address]        Dear ,    We are writing to inform you of your test results.    {results letter list:[id number]}    Resulted Orders   Hemoglobin   Result Value Ref Range    Hemoglobin 13.9 11.7 - 15.7 g/dL   Platelet count   Result Value Ref Range    Platelet Count 175 150 - 450 10e9/L       If you have any questions or concerns, please call the clinic at the number listed above.       Sincerely,        Berna Goodson MD

## 2020-10-28 NOTE — LETTER
October 28, 2020      Neli Gregorio  [address] [place] [address]        Dear ,    We are writing to inform you of your test results.    Enclosed are your labs.    Resulted Orders   Hemoglobin   Result Value Ref Range    Hemoglobin 13.9 11.7 - 15.7 g/dL   Platelet count   Result Value Ref Range    Platelet Count 175 150 - 450 10e9/L       If you have any questions or concerns, please call the clinic at the number listed above.       Sincerely,        Berna Goodson MD

## 2020-11-02 ENCOUNTER — TELEPHONE (OUTPATIENT)
Dept: FAMILY MEDICINE | Facility: CLINIC | Age: 65
End: 2020-11-02

## 2020-11-02 NOTE — TELEPHONE ENCOUNTER
Reason for Call:  Other call back    Detailed comments: patient is having surgery 11/5/20 wants to know if she can take some Sudafed (today)    Phone Number Patient can be reached at: Home number on file [phone] (home)    Best Time: Today Please    Can we leave a detailed message on this number? YES    Call taken on 11/2/2020 at 4:50 PM by Yanni López

## 2020-11-03 NOTE — TELEPHONE ENCOUNTER
Called pt- no answer, left detailed VM and to call back if she has any further questions    Migel OROZCO RN

## 2020-11-03 NOTE — TELEPHONE ENCOUNTER
I don't see any reason why should cannot take it, the risk would be that her blood pressure might be high in the preop area due to the decongestant, an alternative would be a topical decongestant for a 3 days (oxymetolazone 'Afrin')

## 2020-11-16 ENCOUNTER — TRANSFERRED RECORDS (OUTPATIENT)
Dept: HEALTH INFORMATION MANAGEMENT | Facility: CLINIC | Age: 65
End: 2020-11-16

## 2020-12-01 ENCOUNTER — VIRTUAL VISIT (OUTPATIENT)
Dept: FAMILY MEDICINE | Facility: OTHER | Age: 65
End: 2020-12-01
Payer: COMMERCIAL

## 2020-12-01 DIAGNOSIS — Z20.822 SUSPECTED COVID-19 VIRUS INFECTION: Primary | ICD-10-CM

## 2020-12-01 PROCEDURE — 99421 OL DIG E/M SVC 5-10 MIN: CPT | Performed by: PHYSICIAN ASSISTANT

## 2020-12-01 NOTE — PROGRESS NOTES
"Date: 2020 12:15:43  Clinician: Amy Gaona  Clinician NPI: [id number]  Patient: Neli Gregorio  Patient : 1955  Patient Address: [address] [place] [address]  Patient Phone: [phone]  Visit Protocol: URI  Patient Summary:  Neli is a 65 year old ( : 1955 ) female who initiated a [facility] Visit for COVID-19 (Coronavirus) evaluation and screening. When asked the question \"Please sign me up to receive news, health information and promotions from [facility].\", Neli responded \"No\".    Neli states her symptoms started gradually 3-4 days ago.   Her symptoms consist of a cough, myalgia, chills, malaise, and diarrhea.   Symptom details   Cough: Neli coughs a few times an hour and her cough is not more bothersome at night. Phlegm comes into her throat when she coughs. She does not believe her cough is caused by post-nasal drip. The color of the phlegm is clear.    Neli denies having ear pain, headache, wheezing, fever, nasal congestion, nausea, vomiting, rhinitis, facial pain or pressure, sore throat, teeth pain, ageusia, and anosmia. She also denies taking antibiotic medication in the past month, having recent facial or sinus surgery in the past 60 days, and double sickening (worsening symptoms after initial improvement). She is not experiencing dyspnea.   Precipitating events  She has not recently been exposed to someone with influenza. Neli has not been in close contact with any high risk individuals.   Pertinent COVID-19 (Coronavirus) information  Neli does not work or volunteer as healthcare worker or a . In the past 14 days, Neli has not worked or volunteered at a healthcare facility or group living setting.   In the past 14 days, she also has not lived in a congregate living setting.   Neli has not had a close contact with a laboratory-confirmed COVID-19 patient within 14 days of symptom onset.    Since 2019, Neli has been tested for COVID-19 and " has not had upper respiratory infection or influenza-like illness.      Result of COVID-19 test: Negative     Date of her COVID-19 test: 11/03/2020      Pertinent medical history  She has not been told by her provider to avoid NSAIDs.   Neli does not get yeast infections when she takes antibiotics.   Neli does not have diabetes. She denies having immunosuppressive conditions (e.g., chemotherapy, HIV, organ transplant, long-term use of steroids or other immunosuppressive medications, splenectomy). She does not have severe COPD and congestive heart failure. She does not have asthma.   Neli does not need a return to work/school note.   Weight: 150 lbs   Neli does not smoke or use smokeless tobacco.   Additional information as reported by the patient (free text): I have fairly severe lower back pain   Weight: 150 lbs    MEDICATIONS: biotin oral, zinc gluconate-zinc picolinate oral, Calcium with Vitamin D oral, atorvastatin oral, ALLERGIES: NKDA  Clinician Response:  Dear Neli,   Your symptoms show that you may have coronavirus (COVID-19). This illness can cause fever, cough and trouble breathing. Many people get a mild case and get better on their own. Some people can get very sick.  What should I do?  We would like to test you for this virus.   1. Please call [phone] to schedule your visit. Explain that you were referred by [facility] to have a COVID-19 test. Be ready to share your [facility] visit ID number.  * If you need to schedule in [facility] please call [phone] or for [employer] employees please call [phone].  * If you need to schedule in the [place] area please call [phone]. [place] employees call [phone].  The following will serve as your written order for this COVID Test, ordered by me, for the indication of suspected COVID [Z20.828]: The test will be ordered in [employer], our electronic health record, after you are scheduled. It will show as ordered and authorized by Puma Crow MD.  Order:  "COVID-19 (Coronavirus) PCR for SYMPTOMATIC testing from [facility].   2. When it's time for your COVID test:  Stay at least 6 feet away from others. (If someone will drive you to your test, stay in the backseat, as far away from the  as you can.)   Cover your mouth and nose with a mask, tissue or washcloth.  Go straight to the testing site. Don't make any stops on the way there or back.      3.Starting now: Stay home and away from others (self-isolate) until:   You've had no fever---and no medicine that reduces fever---for one full day (24 hours). And...   Your other symptoms have gotten better. For example, your cough or breathing has improved. And...   At least 10 days have passed since your symptoms started.       During this time, don't leave the house except for testing or medical care.   Stay in your own room, even for meals. Use your own bathroom if you can.   Stay away from others in your home. No hugging, kissing or shaking hands. No visitors.  Don't go to work, school or anywhere else.    Clean \"high touch\" surfaces often (doorknobs, counters, handles, etc.). Use a household cleaning spray or wipes. You'll find a full list of  on the EPA website: www.epa.gov/pesticide-registration/list-n-disinfectants-use-against-sars-cov-2.   Cover your mouth and nose with a mask, tissue or washcloth to avoid spreading germs.  Wash your hands and face often. Use soap and water.  Caregivers in these groups are at risk for severe illness due to COVID-19:  o People 65 years and older  o People who live in a nursing home or long-term care facility  o People with chronic disease (lung, heart, cancer, diabetes, kidney, liver, immunologic)  o People who have a weakened immune system, including those who:   Are in cancer treatment  Take medicine that weakens the immune system, such as corticosteroids  Had a bone marrow or organ transplant  Have an immune deficiency  Have poorly controlled HIV or AIDS  Are obese (body " mass index of 40 or higher)  Smoke regularly   o Caregivers should wear gloves while washing dishes, handling laundry and cleaning bedrooms and bathrooms.  o Use caution when washing and drying laundry: Don't shake dirty laundry, and use the warmest water setting that you can.  o For more tips, go to www.cdc.gov/coronavirus/2019-ncov/downloads/10Things.pdf.    4.Sign up for [employer]. We know it's scary to hear that you might have COVID-19. We want to track your symptoms to make sure you're okay over the next 2 weeks. Please look for an email from [employer]---this is a free, online program that we'll use to keep in touch. To sign up, follow the link in the email. Learn more at [email or web address]  How can I take care of myself?   Get lots of rest. Drink extra fluids (unless a doctor has told you not to).   Take Tylenol (acetaminophen) for fever or pain. If you have liver or kidney problems, ask your family doctor if it's okay to take Tylenol.   Adults can take either:    650 mg (two 325 mg pills) every 4 to 6 hours, or...   1,000 mg (two 500 mg pills) every 8 hours as needed.    Note: Don't take more than 3,000 mg in one day. Acetaminophen is found in many medicines (both prescribed and over-the-counter medicines). Read all labels to be sure you don't take too much.   For children, check the Tylenol bottle for the right dose. The dose is based on the child's age or weight.    If you have other health problems (like cancer, heart failure, an organ transplant or severe kidney disease): Call your specialty clinic if you don't feel better in the next 2 days.       Know when to call 911. Emergency warning signs include:    Trouble breathing or shortness of breath Pain or pressure in the chest that doesn't go away Feeling confused like you haven't felt before, or not being able to wake up Bluish-colored lips or face.  Where can I get more information?   [facility] -- About COVID-19:  [email or web address]   CDC -- What to Do If You're Sick: www.cdc.gov/coronavirus/2019-ncov/about/steps-when-sick.html   CDC -- Ending Home Isolation: www.cdc.gov/coronavirus/2019-ncov/hcp/disposition-in-home-patients.html   CDC -- Caring for Someone: www.cdc.gov/coronavirus/2019-ncov/if-you-are-sick/care-for-someone.html   [facility] -- Interim Guidance for Hospital Discharge to Home: www.[facility].mn./diseases/coronavirus/hcp/hospdischarge.pdf   [facility] clinical trials (COVID-19 research studies): clinicalaffairs.[facility]-clinical-trials    Below are the COVID-19 hotlines at the [place] [facility] ([facility]). Interpreters are available.    For health questions: Call 005-467-1350 or 1-652.482.6839 (7 a.m. to 7 p.m.) For questions about schools and childcare: Call 567-501-0631 or 1-383.246.6880 (7 a.m. to 7 p.m.)    Diagnosis: Contact with and (suspected) exposure to other viral communicable diseases  Diagnosis ICD: Z20.828

## 2020-12-02 DIAGNOSIS — Z20.822 SUSPECTED COVID-19 VIRUS INFECTION: ICD-10-CM

## 2020-12-02 PROCEDURE — U0003 INFECTIOUS AGENT DETECTION BY NUCLEIC ACID (DNA OR RNA); SEVERE ACUTE RESPIRATORY SYNDROME CORONAVIRUS 2 (SARS-COV-2) (CORONAVIRUS DISEASE [COVID-19]), AMPLIFIED PROBE TECHNIQUE, MAKING USE OF HIGH THROUGHPUT TECHNOLOGIES AS DESCRIBED BY CMS-2020-01-R: HCPCS | Performed by: FAMILY MEDICINE

## 2020-12-04 LAB
SARS-COV-2 RNA SPEC QL NAA+PROBE: ABNORMAL
SPECIMEN SOURCE: ABNORMAL

## 2020-12-24 ENCOUNTER — NURSE TRIAGE (OUTPATIENT)
Dept: FAMILY MEDICINE | Facility: CLINIC | Age: 65
End: 2020-12-24

## 2020-12-24 ENCOUNTER — NURSE TRIAGE (OUTPATIENT)
Dept: NURSING | Facility: CLINIC | Age: 65
End: 2020-12-24

## 2020-12-24 NOTE — TELEPHONE ENCOUNTER
FNA triage call : + covid test on 12/1/20 and self isolation finished but diarrhea persisitng as only Sx and taking   Presenting problem : Pt called.  Diarrhea = BM up 2 times since midnight - at worse liquidy, Currently : pushing fluids and last voided 630am , eating , and activity are good,   Guideline used : Diarrhea A OH.   Disposition and recommendations : COVID 19 Nurse Triage Plan/Patient Instructions    Please be aware that novel coronavirus (COVID-19) may be circulating in the community. If you develop symptoms such as fever, cough, or SOB or if you have concerns about the presence of another infection including coronavirus (COVID-19), please contact your health care provider or visit [email or web address].     Disposition/Instructions / Pt sent to  for in person visit.     In-Person Visit with provider recommended. Reference Visit Selection Guide.    Thank you for taking steps to prevent the spread of this virus.  o Limit your contact with others.  o Wear a simple mask to cover your cough.  o Wash your hands well and often    Caller verbalizes understanding and denies further questions and will call back if further symptoms to triage or questions  . Jessica Martinez RN  - [place] Nurse Advisor     Additional Information    Negative: Shock suspected (e.g., cold/pale/clammy skin, too weak to stand, low BP, rapid pulse)    Negative: Difficult to awaken or acting confused (e.g., disoriented, slurred speech)    Negative: Sounds like a life-threatening emergency to the triager    Negative: Vomiting also present and worse than the diarrhea    Negative: Blood in stool and without diarrhea    Negative: SEVERE abdominal pain (e.g., excruciating) and present > 1 hour    Negative: SEVERE abdominal pain and age > 60    Negative: Bloody, black, or tarry bowel movements    Negative: SEVERE diarrhea (e.g., 7 or more times / day more than normal) and age > 60 years    Negative: Constant abdominal pain lasting > 2 hours     Negative: Drinking very little and has signs of dehydration (e.g., no urine > 12 hours, very dry mouth, very lightheaded)    Negative: Patient sounds very sick or weak to the triager    Negative: SEVERE diarrhea (e.g., 7 or more times / day more than normal) and present > 24 hours (1 day)    Negative: MODERATE diarrhea (e.g., 4-6 times / day more than normal) and present > 48 hours (2 days)    Negative: MODERATE diarrhea (e.g., 4-6 times / day more than normal) and age > 70 years    Negative: Abdominal pain  (Exception: pain clears completely with each passage of diarrhea stool)    Negative: Fever > 101 F (38.3 C)    Negative: Blood in the stool    Negative: Mucus or pus in stool has been present > 2 days and diarrhea is more than mild    Negative: Weak immune system (e.g., HIV positive, cancer chemo, splenectomy, organ transplant, chronic steroids)    Negative: Travel to a foreign country in past month    Negative: Recent antibiotic therapy (i.e., within last 2 months) and diarrhea present > 3 days since antibiotic was stopped    Negative: Recent hospitalization and diarrhea present > 3 days    Negative: Tube feedings (e.g., nasogastric, g-tube, j-tube)    MILD diarrhea (e.g., 1-3 or more stools than normal in past 24 hours) diarrhea without known cause and present > 7 days    Protocols used: DIARRHEA-A-OH

## 2021-01-08 ENCOUNTER — TRANSFERRED RECORDS (OUTPATIENT)
Dept: HEALTH INFORMATION MANAGEMENT | Facility: CLINIC | Age: 66
End: 2021-01-08

## 2021-02-02 ENCOUNTER — NURSE TRIAGE (OUTPATIENT)
Dept: NURSING | Facility: CLINIC | Age: 66
End: 2021-02-02

## 2021-02-03 NOTE — TELEPHONE ENCOUNTER
Pt called stating she received a voice message today regarding appointment with Dr Goodson for  This coming Friday at 7:30 pm. Pt was informed the next scheduled appointment she has with Dr Goodson is 2/11/2021. She stated she would like to cancel that appointment and reschedule, because she will be out of town that day. Pt was transferred to [facility] and she rescheduled to wednesday 2/10/2021.       Yasmany Cast RN  [facility] Nurse Advisors         COVID 19 Nurse Triage Plan/Patient Instructions    Please be aware that novel coronavirus (COVID-19) may be circulating in the community. If you develop symptoms such as fever, cough, or SOB or if you have concerns about the presence of another infection including coronavirus (COVID-19), please contact your health care provider or visit [email or web address].     Disposition/Instructions    Home care recommended. Follow home care protocol based instructions.    Thank you for taking steps to prevent the spread of this virus.  o Limit your contact with others.  o Wear a simple mask to cover your cough.  o Wash your hands well and often.    Resources    [facility]: About COVID-19: [email or web address]    CDC: What to Do If You're Sick: www.cdc.gov/coronavirus/2019-ncov/about/steps-when-sick.html    CDC: Ending Home Isolation: www.cdc.gov/coronavirus/2019-ncov/hcp/disposition-in-home-patients.html     CDC: Caring for Someone: www.cdc.gov/coronavirus/2019-ncov/if-you-are-sick/care-for-someone.html     [facility]: Interim Guidance for Hospital Discharge to Home: www.health.[facility].mn.us/diseases/coronavirus/hcp/hospdischarge.pdf    [facility] clinical trials (COVID-19 research studies): clinicalaffairs.[facility].edu/um-clinical-trials     Below are the COVID-19 hotlines at the [facility] ([facility]). Interpreters are available.   o For health questions: Call 457-930-8096 or 1-806.389.7261 (7 a.m. to 7 p.m.)  o For questions about schools and  childcare: Call 790-096-9841 or 1-959.265.9124 (7 a.m. to 7 p.m.)                   Additional Information    Question about upcoming scheduled test, no triage required and triager able to answer question    Protocols used: INFORMATION ONLY CALL-A-AH

## 2021-02-10 ENCOUNTER — OFFICE VISIT (OUTPATIENT)
Dept: FAMILY MEDICINE | Facility: CLINIC | Age: 66
End: 2021-02-10
Payer: COMMERCIAL

## 2021-02-10 VITALS
TEMPERATURE: 97.2 F | HEART RATE: 67 BPM | OXYGEN SATURATION: 100 % | SYSTOLIC BLOOD PRESSURE: 122 MMHG | DIASTOLIC BLOOD PRESSURE: 79 MMHG | WEIGHT: 147 LBS | HEIGHT: 65 IN | BODY MASS INDEX: 24.49 KG/M2

## 2021-02-10 DIAGNOSIS — E78.5 HYPERLIPIDEMIA WITH TARGET LDL LESS THAN 100: ICD-10-CM

## 2021-02-10 DIAGNOSIS — F51.01 PRIMARY INSOMNIA: ICD-10-CM

## 2021-02-10 DIAGNOSIS — A09 DIARRHEA OF INFECTIOUS ORIGIN: ICD-10-CM

## 2021-02-10 DIAGNOSIS — U07.1 CLINICAL DIAGNOSIS OF COVID-19: Primary | ICD-10-CM

## 2021-02-10 PROCEDURE — 99214 OFFICE O/P EST MOD 30 MIN: CPT | Performed by: INTERNAL MEDICINE

## 2021-02-10 RX ORDER — TRAZODONE HYDROCHLORIDE 50 MG/1
50 TABLET, FILM COATED ORAL AT BEDTIME
Qty: 90 TABLET | Refills: 1 | Status: SHIPPED | OUTPATIENT
Start: 2021-02-10 | End: 2021-07-28

## 2021-02-10 ASSESSMENT — MIFFLIN-ST. JEOR: SCORE: 1218.7

## 2021-02-10 NOTE — PROGRESS NOTES
Chief Complaint:   Neli Gregorio is a 65 year old female who presents to clinic today for the following health issues:    follow up of multiple concerns including COVID-19 diagnosis, diarrhea      HPI:   Patient Neli Gregorio is a very pleasant 65 year old female with history of allergies who presents to Internal Medicine clinic today for follow up of multiple concerns including recent COVID-19 diagnosis, diarrhea. Regarding the patient's recent COVID-19 infection with diarrhea symptoms, the patient reports improvement over the past several weeks. No cough, fever or chills at this time. Patient also reports poorly controlled chronic insomnia symptoms at this time. No chest pain, headaches, fever or chills at this time.        Current Medications:     Current Outpatient Medications   Medication Sig Dispense Refill     atorvastatin (LIPITOR) 40 MG tablet TAKE ONE TABLET BY MOUTH ONE TIME DAILY  90 tablet 2     BIOTIN PO Take 5,000 mcg by mouth daily       Cholecalciferol (VITAMIN D3 PO) Take 4,000 Units by mouth daily        cyclobenzaprine (FLEXERIL) 10 MG tablet Take 1 tablet (10 mg) by mouth 3 times daily as needed for muscle spasms 20 tablet 0     desonide (DESOWEN) 0.05 % external cream Apply sparingly to perineal area two times daily for 14 days. 15 g 3     EPINEPHrine (ANY BX GENERIC EQUIV) 0.3 MG/0.3ML injection 2-pack Inject 0.3 mLs (0.3 mg) into the muscle once as needed for anaphylaxis 2 each 1     traZODone (DESYREL) 50 MG tablet Take 1 tablet (50 mg) by mouth At Bedtime 90 tablet 1     zinc gluconate 50 MG tablet Take 50 mg by mouth daily           Allergies:      Allergies   Allergen Reactions     Bee Venom Anaphylaxis     Cats Other (See Comments)     scratchy throat              Past Medical History:     Past Medical History:   Diagnosis Date     Abdominal pain      Atrophic vaginitis      Decreased libido 2015    trial of testosterone cream. Not interested in IM.     Hypercholesteraemia 2010      Hyperlipidemia     PATIENT STARTED LOVASTATIN      Insomnia, unspecified     Takes 1/4 tab of Ambien 5mg nightly.      Proctitis      Psoriasis     Dx originally by derm, mild disease. TCM prn.         Past Surgical History:     Past Surgical History:   Procedure Laterality Date     COLONOSCOPY       COLONOSCOPY N/A 2018    Procedure: INTRAOPERATIVE COLONOSCOPY;  Surgeon: Mary Grigsby MD;  Location:  OR     EYE SURGERY      lasik     HEMORRHOIDECTOMY INTERNAL N/A 2018    Procedure: HEMORRHOIDECTOMY INTERNAL;  Surgeon: Mary Grigsby MD;  Location:  OR     MAMMOPLASTY AUGMENTATION      Breast lift     ORTHOPEDIC SURGERY  2018    left shoulder repair         Family Medical History:     Family History   Problem Relation Age of Onset     Heart Disease Father      Hypertension Father      Hyperlipidemia Father      Alzheimer Disease Mother      Hyperlipidemia Mother      Osteoporosis Other         aunt     Osteoporosis Maternal Grandmother          Social History:     Social History     Socioeconomic History     Marital status:      Spouse name: Samuel     Number of children: 1     Years of education: Not on file     Highest education level: Not on file   Occupational History     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     Occupation:      Comment: [employer]   Social Needs     Financial resource strain: Not on file     Food insecurity     Worry: Not on file     Inability: Not on file     Transportation needs     Medical: Not on file     Non-medical: Not on file   Tobacco Use     Smoking status: Former Smoker     Packs/day: 1.00     Years: 10.00     Pack years: 10.00     Types: Cigarettes     Start date: 1975     Quit date: 1985     Years since quittin.1     Smokeless tobacco: Never Used   Substance and Sexual Activity     Alcohol use: Yes     Alcohol/week: 0.0 standard drinks     Comment: 1/day     Drug use: No     Sexual activity: Yes  "    Partners: Male     Birth control/protection: Post-menopausal   Lifestyle     Physical activity     Days per week: Not on file     Minutes per session: Not on file     Stress: Not on file   Relationships     Social connections     Talks on phone: Not on file     Gets together: Not on file     Attends Sabianism service: Not on file     Active member of club or organization: Not on file     Attends meetings of clubs or organizations: Not on file     Relationship status: Not on file     Intimate partner violence     Fear of current or ex partner: Not on file     Emotionally abused: Not on file     Physically abused: Not on file     Forced sexual activity: Not on file   Other Topics Concern     Parent/sibling w/ CABG, MI or angioplasty before 65F 55M? No   Social History Narrative     Not on file           Review of System:     Constitutional: Negative for fever or chills  Skin: Negative for rashes  Ears/Nose/Throat: Negative for nasal congestion, sore throat  Respiratory: No shortness of breath, dyspnea on exertion, cough, or hemoptysis, positive for recent COVID-19 diagnosis  Cardiovascular: Negative for chest pain  Gastrointestinal: Negative for nausea, vomiting, positive for diarrhea  Genitourinary: Negative for dysuria, hematuria  Musculoskeletal: positive for mechanical left side and low back pains  Neurologic: Negative for headaches  Psychiatric: Negative for depression, anxiety  Hematologic/Lymphatic/Immunologic: Negative  Endocrine: Negative  Behavioral: Negative for tobacco use       Physical Exam:   /79 (BP Location: Left arm, Patient Position: Sitting, Cuff Size: Adult Regular)   Pulse 67   Temp 97.2  F (36.2  C) (Temporal)   Ht 1.645 m (5' 4.75\")   Wt 66.7 kg (147 lb)   SpO2 100%   BMI 24.65 kg/m      GENERAL: alert and no distress  EYES: eyes grossly normal to inspection, and conjunctivae and sclerae normal  HENT: Normocephalic atraumatic. Nose and mouth without ulcers or lesions  NECK: " supple  RESP: lungs clear to auscultation   CV: regular rate and rhythm, normal S1 S2  LYMPH: no peripheral edema   ABDOMEN: nondistended  MS: mild left side and low back pains noted concerning for musculoskeletal pains  SKIN: no suspicious lesions or rashes  NEURO: Alert & Oriented x 3.   PSYCH: mentation appears normal, affect normal        Diagnostic Test Results:     Diagnostic Test Results:  Labs reviewed in Epic    ASSESSMENT/PLAN:   (A09) Diarrhea of infectious origin  (U07.1) Clinical diagnosis of COVID-19  (primary encounter diagnosis)  Comment: recent diagnosis of COVID-19 with diarrhea symptoms, improved. No cough, fever or chills at this time.  Plan: COVID-19 Virus (Coronavirus) Antibody & Titer         Reflex         (E78.5) Hyperlipidemia with target LDL less than 100  Comment: stable  Plan: continue current therapy    (F51.01) Primary insomnia  Comment: not well controlled  Plan: traZODone (DESYREL) 50 MG tablet      Follow Up Plan:     Patient is instructed to return to Internal Medicine clinic for follow-up visit in 1 month.        Berna Goodson MD  Internal Medicine  [facility]

## 2021-02-11 LAB
SARS-COV-2 AB PNL SERPL IA: NORMAL
SARS-COV-2 IGG SERPL IA-ACNC: NORMAL

## 2021-03-17 NOTE — PROGRESS NOTES
Neli is a 65 year old  female who presents for annual exam.     Besides routine health maintenance, she has no other health concerns today .    HPI:  The patient's PCP is  Berna Goodson MD.    Patient had covid in December and recovered  Epi pen and lipitor refill  Labs all normal last years so will repeat next years.         GYNECOLOGIC HISTORY:    No LMP recorded. Patient is postmenopausal.    Regular menses? NA  Menses every NA days.  Length of menses: NA days    Her current contraception method is: menopause.  She  reports that she quit smoking about 36 years ago. Her smoking use included cigarettes. She started smoking about 46 years ago. She has a 10.00 pack-year smoking history. She has never used smokeless tobacco.    Patient is sexually active.  STD testing offered?  Declined  Last PHQ-9 score on record =   PHQ-9 SCORE 3/18/2021   PHQ-9 Total Score 1     Last GAD7 score on record =   GRIFFIN-7 SCORE 3/18/2021   Total Score 0     Alcohol Score = 2    HEALTH MAINTENANCE:  Cholesterol: (  Cholesterol   Date Value Ref Range Status   2020 184 <200 mg/dL Final   2019 168 <200 mg/dL Final      Last Mammo: One year ago, Result: Normal, Next Mammo: Today   Pap: (  Lab Results   Component Value Date    PAP NIL HPV- 2019      Colonoscopy:  2018, Result: Normal, Next Colonoscopy: 10 years.  Dexa:      Health maintenance updated:  yes    HISTORY:  OB History    Para Term  AB Living   2 1 1 0 1 1   SAB TAB Ectopic Multiple Live Births   0 1 0 0 1      # Outcome Date GA Lbr Delonte/2nd Weight Sex Delivery Anes PTL Lv   2 Term 85 39w0d  3.345 kg (7 lb 6 oz) M Vag-Vacuum   MAX   1 TAB                Patient Active Problem List   Diagnosis     Hyperlipidemia with target LDL less than 100     Impingement syndrome of shoulder region, right     Past Surgical History:   Procedure Laterality Date     COLONOSCOPY       COLONOSCOPY N/A 2018    Procedure: INTRAOPERATIVE  COLONOSCOPY;  Surgeon: Mary Grigsby MD;  Location:  OR     EYE SURGERY      lasik     HEMORRHOIDECTOMY INTERNAL N/A 2018    Procedure: HEMORRHOIDECTOMY INTERNAL;  Surgeon: Mary Grigsby MD;  Location:  OR     MAMMOPLASTY AUGMENTATION  2007    Breast lift     ORTHOPEDIC SURGERY  2018    left shoulder repair      Social History     Tobacco Use     Smoking status: Former Smoker     Packs/day: 1.00     Years: 10.00     Pack years: 10.00     Types: Cigarettes     Start date: 1975     Quit date: 1985     Years since quittin.2     Smokeless tobacco: Never Used   Substance Use Topics     Alcohol use: Yes     Alcohol/week: 0.0 standard drinks     Comment: 1/day      Problem (# of Occurrences) Relation (Name,Age of Onset)    Alzheimer Disease (1) Mother (Leticia)    Heart Disease (1) Father (Timmy)    Hyperlipidemia (2) Father (Timmy), Mother (Leticia)    Hypertension (1) Father (Timmy)    Osteoporosis (2) Other (Catherine): aunt, Maternal Grandmother (Catherine)            Current Outpatient Medications   Medication Sig     atorvastatin (LIPITOR) 40 MG tablet TAKE ONE TABLET BY MOUTH ONE TIME DAILY      BIOTIN PO Take 5,000 mcg by mouth daily     Cholecalciferol (VITAMIN D3 PO) Take 4,000 Units by mouth daily      desonide (DESOWEN) 0.05 % external cream Apply sparingly to perineal area two times daily for 14 days.     EPINEPHrine (ANY BX GENERIC EQUIV) 0.3 MG/0.3ML injection 2-pack Inject 0.3 mLs (0.3 mg) into the muscle once as needed for anaphylaxis     zinc gluconate 50 MG tablet Take 50 mg by mouth daily     cyclobenzaprine (FLEXERIL) 10 MG tablet Take 1 tablet (10 mg) by mouth 3 times daily as needed for muscle spasms (Patient not taking: Reported on 3/18/2021)     traZODone (DESYREL) 50 MG tablet Take 1 tablet (50 mg) by mouth At Bedtime (Patient not taking: Reported on 3/18/2021)     No current facility-administered medications for this visit.      Allergies   Allergen Reactions      "Bee Venom Anaphylaxis     Cats Other (See Comments)     scratchy throat         Past medical, surgical, social and family histories were reviewed and updated in EPIC.    ROS:   12 point review of systems negative other than symptoms noted below or in the HPI.  No urinary frequency or dysuria, bladder or kidney problems    EXAM:  /68   Pulse 74   Ht 1.651 m (5' 5\")   Wt 67.1 kg (148 lb)   BMI 24.63 kg/m     BMI: Body mass index is 24.63 kg/m .    PHYSICAL EXAM:  Constitutional:   Appearance: Well nourished, well developed, alert, in no acute distress  Neck:  Lymph Nodes:  No lymphadenopathy present    Thyroid:  Gland size normal, nontender, no nodules or masses present  on palpation  Chest:  Respiratory Effort:  Breathing unlabored  Cardiovascular:    Heart: Auscultation:  Regular rate, normal rhythm, no murmurs present  Breasts: Inspection of Breasts:  No lymphadenopathy present., Palpation of Breasts and Axillae:  No masses present on palpation, no breast tenderness., Axillary Lymph Nodes:  No lymphadenopathy present. and No nodularity, asymmetry or nipple discharge bilaterally.  Gastrointestinal:   Abdominal Examination:  Abdomen nontender to palpation, tone normal without rigidity or guarding, no masses present, umbilicus without lesions   Liver and Spleen:  No hepatomegaly present, liver nontender to palpation    Hernias:  No hernias present  Lymphatic: Lymph Nodes:  No other lymphadenopathy present  Skin:  General Inspection:  No rashes present, no lesions present, no areas of  discoloration  Neurologic:    Mental Status:  Oriented X3.  Normal strength and tone, sensory exam                grossly normal, mentation intact and speech normal.    Psychiatric:   Mentation appears normal and affect normal/bright.         Pelvic Exam:  External Genitalia:     Normal appearance for age, no discharge present, no tenderness present, no inflammatory lesions present, color normal  Vagina:     Normal vaginal " vault without central or paravaginal defects, no discharge present, no inflammatory lesions present, no masses present  Bladder:     Nontender to palpation  Urethra:   Urethral Body:  Urethra palpation normal, urethra structural support normal   Urethral Meatus:  No erythema or lesions present  Cervix:     Appearance healthy, no lesions present, nontender to palpation, no bleeding present  Uterus:     Uterus: firm, normal sized and nontender, midplane in position.   Adnexa:     No adnexal tenderness present, no adnexal masses present  Perineum:     Perineum within normal limits, no evidence of trauma, no rashes or skin lesions present  Anus:     Anus within normal limits, no hemorrhoids present  Inguinal Lymph Nodes:     No lymphadenopathy present  Pubic Hair:     Normal pubic hair distribution for age  Genitalia and Groin:     No rashes present, no lesions present, no areas of discoloration, no masses present          BMI: Body mass index is 24.63 kg/m .      ASSESSMENT:  65 year old female with satisfactory annual exam.    ICD-10-CM    1. Encounter for gynecological examination without abnormal finding  Z01.419    2. Allergy to honey bee venom  Z91.030    3. Hypercholesterolemia  E78.00        PLAN:  Refill epi pen and her statin  Discussed covid issues  Return 1 yr    Nuvia Riley MD

## 2021-03-18 ENCOUNTER — ANCILLARY PROCEDURE (OUTPATIENT)
Dept: MAMMOGRAPHY | Facility: CLINIC | Age: 66
End: 2021-03-18
Payer: COMMERCIAL

## 2021-03-18 ENCOUNTER — OFFICE VISIT (OUTPATIENT)
Dept: OBGYN | Facility: CLINIC | Age: 66
End: 2021-03-18
Payer: COMMERCIAL

## 2021-03-18 VITALS
BODY MASS INDEX: 24.66 KG/M2 | DIASTOLIC BLOOD PRESSURE: 68 MMHG | HEIGHT: 65 IN | SYSTOLIC BLOOD PRESSURE: 110 MMHG | HEART RATE: 74 BPM | WEIGHT: 148 LBS

## 2021-03-18 DIAGNOSIS — Z01.419 ENCOUNTER FOR GYNECOLOGICAL EXAMINATION WITHOUT ABNORMAL FINDING: Primary | ICD-10-CM

## 2021-03-18 DIAGNOSIS — Z91.030 ALLERGY TO HONEY BEE VENOM: ICD-10-CM

## 2021-03-18 DIAGNOSIS — E78.00 HYPERCHOLESTEROLEMIA: ICD-10-CM

## 2021-03-18 DIAGNOSIS — Z12.31 VISIT FOR SCREENING MAMMOGRAM: ICD-10-CM

## 2021-03-18 PROCEDURE — 77063 BREAST TOMOSYNTHESIS BI: CPT | Mod: TC | Performed by: RADIOLOGY

## 2021-03-18 PROCEDURE — 99396 PREV VISIT EST AGE 40-64: CPT | Performed by: OBSTETRICS & GYNECOLOGY

## 2021-03-18 PROCEDURE — 77067 SCR MAMMO BI INCL CAD: CPT | Mod: TC | Performed by: RADIOLOGY

## 2021-03-18 RX ORDER — EPINEPHRINE 0.3 MG/.3ML
0.3 INJECTION SUBCUTANEOUS
Qty: 2 EACH | Refills: 3 | Status: SHIPPED | OUTPATIENT
Start: 2021-03-18 | End: 2022-03-22

## 2021-03-18 RX ORDER — ATORVASTATIN CALCIUM 40 MG/1
40 TABLET, FILM COATED ORAL DAILY
Qty: 90 TABLET | Refills: 3 | Status: SHIPPED | OUTPATIENT
Start: 2021-03-18 | End: 2021-08-12

## 2021-03-18 ASSESSMENT — ANXIETY QUESTIONNAIRES
5. BEING SO RESTLESS THAT IT IS HARD TO SIT STILL: NOT AT ALL
1. FEELING NERVOUS, ANXIOUS, OR ON EDGE: NOT AT ALL
GAD7 TOTAL SCORE: 0
2. NOT BEING ABLE TO STOP OR CONTROL WORRYING: NOT AT ALL
IF YOU CHECKED OFF ANY PROBLEMS ON THIS QUESTIONNAIRE, HOW DIFFICULT HAVE THESE PROBLEMS MADE IT FOR YOU TO DO YOUR WORK, TAKE CARE OF THINGS AT HOME, OR GET ALONG WITH OTHER PEOPLE: NOT DIFFICULT AT ALL
7. FEELING AFRAID AS IF SOMETHING AWFUL MIGHT HAPPEN: NOT AT ALL
3. WORRYING TOO MUCH ABOUT DIFFERENT THINGS: NOT AT ALL
6. BECOMING EASILY ANNOYED OR IRRITABLE: NOT AT ALL

## 2021-03-18 ASSESSMENT — PATIENT HEALTH QUESTIONNAIRE - PHQ9
SUM OF ALL RESPONSES TO PHQ QUESTIONS 1-9: 1
5. POOR APPETITE OR OVEREATING: NOT AT ALL

## 2021-03-18 ASSESSMENT — MIFFLIN-ST. JEOR: SCORE: 1227.2

## 2021-03-19 ASSESSMENT — ANXIETY QUESTIONNAIRES: GAD7 TOTAL SCORE: 0

## 2021-07-28 ENCOUNTER — OFFICE VISIT (OUTPATIENT)
Dept: FAMILY MEDICINE | Facility: CLINIC | Age: 66
End: 2021-07-28
Payer: COMMERCIAL

## 2021-07-28 VITALS
BODY MASS INDEX: 24.32 KG/M2 | WEIGHT: 146 LBS | TEMPERATURE: 96.8 F | DIASTOLIC BLOOD PRESSURE: 85 MMHG | HEART RATE: 62 BPM | OXYGEN SATURATION: 100 % | SYSTOLIC BLOOD PRESSURE: 133 MMHG | HEIGHT: 65 IN

## 2021-07-28 DIAGNOSIS — R05.9 COUGH: ICD-10-CM

## 2021-07-28 DIAGNOSIS — J01.90 ACUTE SINUSITIS, RECURRENCE NOT SPECIFIED, UNSPECIFIED LOCATION: Primary | ICD-10-CM

## 2021-07-28 DIAGNOSIS — R23.8 SKIN IRRITATION: ICD-10-CM

## 2021-07-28 DIAGNOSIS — F51.01 PRIMARY INSOMNIA: ICD-10-CM

## 2021-07-28 DIAGNOSIS — E78.5 HYPERLIPIDEMIA WITH TARGET LDL LESS THAN 100: ICD-10-CM

## 2021-07-28 PROCEDURE — 99214 OFFICE O/P EST MOD 30 MIN: CPT | Performed by: INTERNAL MEDICINE

## 2021-07-28 RX ORDER — DESONIDE 0.5 MG/G
CREAM TOPICAL
Qty: 15 G | Refills: 3 | Status: SHIPPED | OUTPATIENT
Start: 2021-07-28

## 2021-07-28 RX ORDER — METHYLPREDNISOLONE 4 MG
TABLET, DOSE PACK ORAL
Qty: 21 TABLET | Refills: 0 | Status: SHIPPED | OUTPATIENT
Start: 2021-07-28 | End: 2021-08-12

## 2021-07-28 ASSESSMENT — MIFFLIN-ST. JEOR: SCORE: 1213.13

## 2021-07-28 NOTE — PROGRESS NOTES
Subjective   Neli is a 66 year old who presents for the following health issues    HPI       Chief Complaint:     follow up of multiple concerns including recent cough, sinusitis symptoms      HPI:   Patient Neli Gregorio is a very pleasant 66 year old female with history of allergies who presents to Internal Medicine clinic today for follow up of multiple concerns including recent cough, sinusitis symptoms. No  fever or chills at this time. Patient also reports well controlled chronic insomnia symptoms at this time without requiring the trazodone medication. No chest pain, headaches, fever or chills at this time.        Current Medications:     Current Outpatient Medications   Medication Sig Dispense Refill     atorvastatin (LIPITOR) 40 MG tablet Take 1 tablet (40 mg) by mouth daily 90 tablet 3     BIOTIN PO Take 5,000 mcg by mouth daily       Cholecalciferol (VITAMIN D3 PO) Take 4,000 Units by mouth daily        desonide (DESOWEN) 0.05 % external cream Apply sparingly to perineal area two times daily for 14 days. 15 g 3     EPINEPHrine (ANY BX GENERIC EQUIV) 0.3 MG/0.3ML injection 2-pack Inject 0.3 mLs (0.3 mg) into the muscle once as needed for anaphylaxis 2 each 3     methylPREDNISolone (MEDROL DOSEPAK) 4 MG tablet therapy pack Follow Package Directions 21 tablet 0     zinc gluconate 50 MG tablet Take 50 mg by mouth daily           Allergies:      Allergies   Allergen Reactions     Bee Venom Anaphylaxis     Cats Other (See Comments)     scratchy throat              Past Medical History:     Past Medical History:   Diagnosis Date     Abdominal pain      Atrophic vaginitis      Decreased libido 2015    trial of testosterone cream. Not interested in IM.     Hypercholesteraemia 2010     Hyperlipidemia     PATIENT STARTED LOVASTATIN 12/09     Insomnia, unspecified     Takes 1/4 tab of Ambien 5mg nightly.      Proctitis      Psoriasis     Dx originally by derm, mild disease. TCM prn.         Past Surgical History:      Past Surgical History:   Procedure Laterality Date     COLONOSCOPY       COLONOSCOPY N/A 2018    Procedure: INTRAOPERATIVE COLONOSCOPY;  Surgeon: Mary Grigsby MD;  Location:  OR     EYE SURGERY  2004    lasik     HEMORRHOIDECTOMY INTERNAL N/A 2018    Procedure: HEMORRHOIDECTOMY INTERNAL;  Surgeon: Mary Grigsby MD;  Location:  OR     MAMMOPLASTY AUGMENTATION      Breast lift     ORTHOPEDIC SURGERY  2018    left shoulder repair         Family Medical History:     Family History   Problem Relation Age of Onset     Heart Disease Father      Hypertension Father      Hyperlipidemia Father      Alzheimer Disease Mother      Hyperlipidemia Mother      Osteoporosis Other         aunt     Osteoporosis Maternal Grandmother          Social History:     Social History     Socioeconomic History     Marital status:      Spouse name: Samuel     Number of children: 1     Years of education: Not on file     Highest education level: Not on file   Occupational History     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 No   Social History Narrative     Not on file     Social Determinants of Health     Financial Resource Strain:      Difficulty of Paying Living Expenses:    Food Insecurity:      Worried About Running Out of Food in the Last Year:      Ran Out of Food in the Last Year:    Transportation  "Needs:      Lack of Transportation (Medical):      Lack of Transportation (Non-Medical):    Physical Activity:      Days of Exercise per Week:      Minutes of Exercise per Session:    Stress:      Feeling of Stress :    Social Connections:      Frequency of Communication with Friends and Family:      Frequency of Social Gatherings with Friends and Family:      Attends Adventism Services:      Active Member of Clubs or Organizations:      Attends Club or Organization Meetings:      Marital Status:    Intimate Partner Violence:      Fear of Current or Ex-Partner:      Emotionally Abused:      Physically Abused:      Sexually Abused:            Review of System:     Constitutional: Negative for fever or chills  Skin: Negative for rashes  Ears/Nose/Throat: positive for nasal congestion, sore throat  Respiratory: positive for cough  Cardiovascular: Negative for chest pain  Gastrointestinal: Negative for nausea, vomiting  Genitourinary: Negative for dysuria, hematuria  Musculoskeletal: negative  Neurologic: Negative for headaches  Psychiatric: Negative for depression, anxiety  Hematologic/Lymphatic/Immunologic: Negative  Endocrine: Negative  Behavioral: Negative for tobacco use       Physical Exam:   /85 (BP Location: Right arm, Patient Position: Sitting, Cuff Size: Adult Regular)   Pulse 62   Temp 96.8  F (36  C) (Temporal)   Ht 1.651 m (5' 5\")   Wt 66.2 kg (146 lb)   SpO2 100%   BMI 24.30 kg/m      GENERAL: alert and no distress  EYES: eyes grossly normal to inspection, and conjunctivae and sclerae normal  HENT: Normocephalic atraumatic. Nose and mouth without ulcers or lesions  NECK: supple  RESP: lungs clear to auscultation   CV: regular rate and rhythm, normal S1 S2  LYMPH: no peripheral edema   ABDOMEN: nondistended  MS: negative  SKIN: no suspicious lesions or rashes  NEURO: Alert & Oriented x 3.   PSYCH: mentation appears normal, affect normal        Diagnostic Test Results:     Diagnostic Test " Results:  Labs reviewed in Epic    ASSESSMENT/PLAN:     (R05) Cough  (J01.90) Acute sinusitis, recurrence not specified, unspecified location  (primary encounter diagnosis)  Comment: acute cough, sinusitis symptoms  Plan: methylPREDNISolone (MEDROL DOSEPAK) 4 MG tablet        therapy pack      (R23.8) Skin irritation  Comment: stable. Patient is due for a refill of desonide medication.  Plan: desonide (DESOWEN) 0.05 % external cream      (E78.5) Hyperlipidemia with target LDL less than 100  Comment: stale  Plan: continue current therapy      (F51.01) Primary insomnia  Comment: well controlled without trazodone  Plan: discontinued traZODone (DESYREL) 50 MG tablet medication        Follow Up Plan:     Patient is instructed to return to Internal Medicine clinic for follow-up visit in 1 month.        Berna Goodson MD  Internal Medicine  [facility]

## 2021-07-30 ENCOUNTER — TRANSFERRED RECORDS (OUTPATIENT)
Dept: HEALTH INFORMATION MANAGEMENT | Facility: CLINIC | Age: 66
End: 2021-07-30

## 2021-08-12 ENCOUNTER — OFFICE VISIT (OUTPATIENT)
Dept: FAMILY MEDICINE | Facility: CLINIC | Age: 66
End: 2021-08-12
Payer: COMMERCIAL

## 2021-08-12 VITALS
HEART RATE: 81 BPM | OXYGEN SATURATION: 100 % | TEMPERATURE: 96.9 F | SYSTOLIC BLOOD PRESSURE: 133 MMHG | RESPIRATION RATE: 12 BRPM | WEIGHT: 145.2 LBS | BODY MASS INDEX: 24.16 KG/M2 | DIASTOLIC BLOOD PRESSURE: 81 MMHG

## 2021-08-12 DIAGNOSIS — M25.511 CHRONIC RIGHT SHOULDER PAIN: ICD-10-CM

## 2021-08-12 DIAGNOSIS — E78.00 HYPERCHOLESTEROLEMIA: ICD-10-CM

## 2021-08-12 DIAGNOSIS — G89.29 CHRONIC RIGHT SHOULDER PAIN: ICD-10-CM

## 2021-08-12 DIAGNOSIS — F51.01 PRIMARY INSOMNIA: ICD-10-CM

## 2021-08-12 DIAGNOSIS — Z23 HIGH PRIORITY FOR 2019 NOVEL CORONAVIRUS VACCINATION: Primary | ICD-10-CM

## 2021-08-12 DIAGNOSIS — E78.5 HYPERLIPIDEMIA WITH TARGET LDL LESS THAN 100: ICD-10-CM

## 2021-08-12 DIAGNOSIS — M75.41 IMPINGEMENT SYNDROME OF SHOULDER REGION, RIGHT: ICD-10-CM

## 2021-08-12 PROCEDURE — 99214 OFFICE O/P EST MOD 30 MIN: CPT | Mod: 25 | Performed by: INTERNAL MEDICINE

## 2021-08-12 PROCEDURE — 0011A COVID-19,PF,MODERNA: CPT | Performed by: INTERNAL MEDICINE

## 2021-08-12 PROCEDURE — 91301 COVID-19,PF,MODERNA: CPT | Performed by: INTERNAL MEDICINE

## 2021-08-12 RX ORDER — ATORVASTATIN CALCIUM 40 MG/1
20 TABLET, FILM COATED ORAL DAILY
Qty: 45 TABLET | Refills: 3
Start: 2021-08-12 | End: 2022-03-22

## 2021-08-12 NOTE — PROGRESS NOTES
Subjective   Neli is a 66 year old who presents for the following health issues    HPI       Chief Complaint:     follow up of multiple concerns including COVID vaccine vaccination, hyperlipidemia, chronic right shoulder pains      HPI:   Patient Neli Gregorio is a very pleasant 66 year old female with history of allergies who presents to Internal Medicine clinic today for follow up of multiple concerns including COVID vaccine vaccination, hyperlipidemia, chronic right shoulder pains. No  fever or chills at this time. Patient also reports well controlled chronic insomnia symptoms at this time without requiring the trazodone medication. No chest pain, headaches, fever or chills at this time. patient requests the COVID vaccine at this time.        Current Medications:     Current Outpatient Medications   Medication Sig Dispense Refill     atorvastatin (LIPITOR) 40 MG tablet Take 1 tablet (40 mg) by mouth daily 90 tablet 3     BIOTIN PO Take 5,000 mcg by mouth daily       Cholecalciferol (VITAMIN D3 PO) Take 4,000 Units by mouth daily        desonide (DESOWEN) 0.05 % external cream Apply sparingly to perineal area two times daily for 14 days. 15 g 3     EPINEPHrine (ANY BX GENERIC EQUIV) 0.3 MG/0.3ML injection 2-pack Inject 0.3 mLs (0.3 mg) into the muscle once as needed for anaphylaxis 2 each 3     zinc gluconate 50 MG tablet Take 50 mg by mouth daily           Allergies:      Allergies   Allergen Reactions     Bee Venom Anaphylaxis     Cats Other (See Comments)     scratchy throat              Past Medical History:     Past Medical History:   Diagnosis Date     Abdominal pain      Atrophic vaginitis      Decreased libido 2015    trial of testosterone cream. Not interested in IM.     Hypercholesteraemia 2010     Hyperlipidemia     PATIENT STARTED LOVASTATIN 12/09     Insomnia, unspecified     Takes 1/4 tab of Ambien 5mg nightly.      Proctitis      Psoriasis     Dx originally by derm, mild disease. TCM prn.          Past Surgical History:     Past Surgical History:   Procedure Laterality Date     COLONOSCOPY       COLONOSCOPY N/A 2018    Procedure: INTRAOPERATIVE COLONOSCOPY;  Surgeon: Mary Grigsby MD;  Location:  OR     EYE SURGERY  2004    lasik     HEMORRHOIDECTOMY INTERNAL N/A 2018    Procedure: HEMORRHOIDECTOMY INTERNAL;  Surgeon: Mary Grigsby MD;  Location:  OR     MAMMOPLASTY AUGMENTATION  2007    Breast lift     ORTHOPEDIC SURGERY  2018    left shoulder repair         Family Medical History:     Family History   Problem Relation Age of Onset     Heart Disease Father      Hypertension Father      Hyperlipidemia Father      Alzheimer Disease Mother      Hyperlipidemia Mother      Osteoporosis Other         aunt     Osteoporosis Maternal Grandmother          Social History:     Social History     Socioeconomic History     Marital status:      Spouse name: Samuel     Number of children: 1     Years of education: Not on file     Highest education level: Not on file   Occupational History     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 No   Social History Narrative     Not on file     Social Determinants of Health     Financial Resource Strain:      Difficulty of Paying Living Expenses:    Food Insecurity:      Worried About Running Out of Food in the Last Year:      Ran Out of Food in  the Last Year:    Transportation Needs:      Lack of Transportation (Medical):      Lack of Transportation (Non-Medical):    Physical Activity:      Days of Exercise per Week:      Minutes of Exercise per Session:    Stress:      Feeling of Stress :    Social Connections:      Frequency of Communication with Friends and Family:      Frequency of Social Gatherings with Friends and Family:      Attends Sikhism Services:      Active Member of Clubs or Organizations:      Attends Club or Organization Meetings:      Marital Status:    Intimate Partner Violence:      Fear of Current or Ex-Partner:      Emotionally Abused:      Physically Abused:      Sexually Abused:            Review of System:     Constitutional: Negative for fever or chills  Skin: Negative for rashes  Ears/Nose/Throat: positive for nasal congestion, sore throat  Respiratory: positive for cough  Cardiovascular: Negative for chest pain  Gastrointestinal: Negative for nausea, vomiting  Genitourinary: Negative for dysuria, hematuria  Musculoskeletal: positive for chronic right shoulder pains  Neurologic: Negative for headaches  Psychiatric: Negative for depression, anxiety  Hematologic/Lymphatic/Immunologic: Negative  Endocrine: Negative  Behavioral: Negative for tobacco use       Physical Exam:   /81   Pulse 81   Temp 96.9  F (36.1  C) (Temporal)   Resp 12   Wt 65.9 kg (145 lb 3.2 oz)   SpO2 100%   Breastfeeding No   BMI 24.16 kg/m      GENERAL: alert and no distress  EYES: eyes grossly normal to inspection, and conjunctivae and sclerae normal  HENT: Normocephalic atraumatic. Nose and mouth without ulcers or lesions  NECK: supple  RESP: lungs clear to auscultation   CV: regular rate and rhythm, normal S1 S2  LYMPH: no peripheral edema   ABDOMEN: nondistended  MS: chronic right shoulder pains again noted  SKIN: no suspicious lesions or rashes  NEURO: Alert & Oriented x 3.   PSYCH: mentation appears normal, affect normal        Diagnostic  Test Results:     Diagnostic Test Results:  Labs reviewed in Epic    ASSESSMENT/PLAN:     (Z23) High priority for 2019 novel coronavirus vaccination  (primary encounter diagnosis)  Comment: patient requests the COVID vaccine at this time.  Plan: Moderna COVID vaccine given in clinic today.      (M25.511,  G89.29) Chronic right shoulder pain  (M75.41) Impingement syndrome of shoulder region, right  Comment: stable  Plan: continue current therapy      (E78.5) Hyperlipidemia with target LDL less than 100  Comment: stale  Plan: continue current therapy      (F51.01) Primary insomnia  Comment: well controlled without trazodone  Plan: discontinued traZODone (DESYREL) 50 MG tablet medication          Follow Up Plan:     Patient is instructed to return to Internal Medicine clinic for follow-up visit in 1 month.        Berna Goodson MD  Internal Medicine  [facility]

## 2021-08-26 ENCOUNTER — NURSE TRIAGE (OUTPATIENT)
Dept: FAMILY MEDICINE | Facility: CLINIC | Age: 66
End: 2021-08-26

## 2021-08-26 NOTE — TELEPHONE ENCOUNTER
"Pt called     2 weeks ago had COVID19 shot    5 days ago diarrhea started     COVID19 in past, had diarrhea with that - initially was explosive and really soft stomach     Unsure if related to the vaccine     Trying to use imodium - not helping     Stools are \"really bad\" - had an incident in bathroom at office, no control     N/V? No     Abdominal pain? No     No blood in stool    No fever     Dizziness/lightheaded? No     Hydration? Keeping up     Triaged per Epic Triage Protocol, gave care advice which patient plans to follow.  See Care advice tab for more information.  Patent to call back if further questions or concerns. Pt declined OV, but agreed to phone visit tomorrow    Allison RODRIGUEZ RN    Reason for Disposition    MILD diarrhea (e.g., 1-3 or more stools than normal in past 24 hours) diarrhea without known cause and present > 7 days    Additional Information    Negative: Shock suspected (e.g., cold/pale/clammy skin, too weak to stand, low BP, rapid pulse)    Negative: Difficult to awaken or acting confused (e.g., disoriented, slurred speech)    Negative: Sounds like a life-threatening emergency to the triager    Negative: Vomiting also present and worse than the diarrhea    Negative: Blood in stool and without diarrhea    Negative: SEVERE abdominal pain (e.g., excruciating) and present > 1 hour    Negative: SEVERE abdominal pain and age > 60    Negative: Bloody, black, or tarry bowel movements (Exception: chronic-unchanged black-grey bowel movements and is taking iron pills or Pepto-bismol)    Negative: SEVERE diarrhea (e.g., 7 or more times / day more than normal) and age > 60 years    Negative: Constant abdominal pain lasting > 2 hours    Negative: Drinking very little and has signs of dehydration (e.g., no urine > 12 hours, very dry mouth, very lightheaded)    Negative: Patient sounds very sick or weak to the triager    Negative: Travel to a foreign country in past month    Negative: Recent antibiotic " therapy (i.e., within last 2 months) and diarrhea present > 3 days since antibiotic was stopped    Negative: Recent hospitalization and diarrhea present > 3 days    Negative: Tube feedings (e.g., nasogastric, g-tube, j-tube)    Negative: SEVERE diarrhea (e.g., 7 or more times / day more than normal) and present > 24 hours (1 day)    Negative: MODERATE diarrhea (e.g., 4-6 times / day more than normal) and present > 48 hours (2 days)    Negative: MODERATE diarrhea (e.g., 4-6 times / day more than normal) and age > 70 years    Negative: Abdominal pain  (Exception: pain clears completely with each passage of diarrhea stool)    Negative: Fever > 101 F (38.3 C)    Negative: Blood in the stool    Negative: Mucus or pus in stool has been present > 2 days and diarrhea is more than mild    Negative: Weak immune system (e.g., HIV positive, cancer chemo, splenectomy, organ transplant, chronic steroids)    Protocols used: DIARRHEA-A-OH

## 2021-08-27 ENCOUNTER — LAB (OUTPATIENT)
Dept: LAB | Facility: CLINIC | Age: 66
End: 2021-08-27
Payer: COMMERCIAL

## 2021-08-27 ENCOUNTER — VIRTUAL VISIT (OUTPATIENT)
Dept: FAMILY MEDICINE | Facility: CLINIC | Age: 66
End: 2021-08-27
Payer: COMMERCIAL

## 2021-08-27 DIAGNOSIS — R19.7 DIARRHEA, UNSPECIFIED TYPE: ICD-10-CM

## 2021-08-27 DIAGNOSIS — R19.7 DIARRHEA, UNSPECIFIED TYPE: Primary | ICD-10-CM

## 2021-08-27 LAB
BASOPHILS # BLD AUTO: 0 10E3/UL (ref 0–0.2)
BASOPHILS NFR BLD AUTO: 0 %
CRP SERPL-MCNC: <2.9 MG/L (ref 0–8)
EOSINOPHIL # BLD AUTO: 0.1 10E3/UL (ref 0–0.7)
EOSINOPHIL NFR BLD AUTO: 3 %
ERYTHROCYTE [DISTWIDTH] IN BLOOD BY AUTOMATED COUNT: 13.6 % (ref 10–15)
ERYTHROCYTE [SEDIMENTATION RATE] IN BLOOD BY WESTERGREN METHOD: 12 MM/HR (ref 0–30)
HCT VFR BLD AUTO: 42.2 % (ref 35–47)
HGB BLD-MCNC: 14.1 G/DL (ref 11.7–15.7)
LYMPHOCYTES # BLD AUTO: 1.6 10E3/UL (ref 0.8–5.3)
LYMPHOCYTES NFR BLD AUTO: 36 %
MCH RBC QN AUTO: 31.9 PG (ref 26.5–33)
MCHC RBC AUTO-ENTMCNC: 33.4 G/DL (ref 31.5–36.5)
MCV RBC AUTO: 96 FL (ref 78–100)
MONOCYTES # BLD AUTO: 0.5 10E3/UL (ref 0–1.3)
MONOCYTES NFR BLD AUTO: 11 %
NEUTROPHILS # BLD AUTO: 2.3 10E3/UL (ref 1.6–8.3)
NEUTROPHILS NFR BLD AUTO: 50 %
PLATELET # BLD AUTO: 174 10E3/UL (ref 150–450)
RBC # BLD AUTO: 4.42 10E6/UL (ref 3.8–5.2)
WBC # BLD AUTO: 4.5 10E3/UL (ref 4–11)

## 2021-08-27 PROCEDURE — 85025 COMPLETE CBC W/AUTO DIFF WBC: CPT

## 2021-08-27 PROCEDURE — 80053 COMPREHEN METABOLIC PANEL: CPT

## 2021-08-27 PROCEDURE — 86140 C-REACTIVE PROTEIN: CPT

## 2021-08-27 PROCEDURE — 36415 COLL VENOUS BLD VENIPUNCTURE: CPT

## 2021-08-27 PROCEDURE — 85652 RBC SED RATE AUTOMATED: CPT

## 2021-08-27 PROCEDURE — 99213 OFFICE O/P EST LOW 20 MIN: CPT | Mod: 95 | Performed by: INTERNAL MEDICINE

## 2021-08-27 NOTE — PROGRESS NOTES
Neli is a 66 year old who is being evaluated via a billable telephone visit.      What phone number would you like to be contacted at? [phone]  How would you like to obtain your AVS? MyChart    Assessment & Plan     Diarrhea, unspecified type  6 days without alarm signs. Unclear etiology: ddx includes viral vs less likely bacterial vs functional vs other.  Check labs and stool tests  Recommend imodium as prescribed on packing, which she is doing.  Recommend aggressive hydration with lytes  Recommend BRAT diet (reviewed and information in pnt education section) and advance slowly/as tolerated.  RTC if worsening or not improving  - CBC with platelets and differential  - Erythrocyte sedimentation rate auto  - CRP inflammation  - Comprehensive metabolic panel  - Enteric Bacteria and Virus Panel by [facility] Stool  - Clostridium difficile Toxin B PCR  - Ova and Parasite Exam Routine  - Fecal Lactoferrin    Return if symptoms worsen or fail to improve.    DO ERNESTO Rider [facility]    Layton Quinteros is a 66 year old who presents for the following health issues     HPI     Diarrhea  Onset/Duration: 6 days  Description:       Consistency of stool: watery, runny, loose and explosive       Blood in stool: no       Number of loose stools past 24 hours: 3  Progression of Symptoms: worsening  Accompanying signs and symptoms:       Fever: no       Nausea/Vomiting: no       Abdominal pain: no       Weight loss: no       Episodes of constipation: no  History   Ill contacts: no  Recent use of antibiotics: no  Recent travels: no  Recent medication-new or changes(Rx or OTC): no  Precipitating or alleviating factors: None  Therapies tried and outcome: Imodium AD    PCP: Hamzah    Diarrhea x 6 days. Has been trying imodium (yesterday was really bad). Had COVID vaccine two weeks ago. Had COVID in December-had GI issue x 3 months after that. 1-2 a day (loose/watery), urgency. No fevers/chills. No abd pain. No n/v.  No recent travel. Nothing outside of ordinary that she has eaten or drank. Nobody else in family is sick. No blood in stool. No use of ab recently.     Review of Systems   Constitutional, HEENT, cardiovascular, pulmonary, gi and gu systems are negative, except as otherwise noted.      Objective           Vitals:  No vitals were obtained today due to virtual visit.    Physical Exam   GEN: No acute distress  RESP: No audible increased work of breathing. Patient speaking in full sentences without distress.  PSYCH: pleasant  Exam otherwise limited due to virtual platform            Phone call duration: 15 minutes

## 2021-08-27 NOTE — PATIENT INSTRUCTIONS
Patient Education     Diarrhea with Uncertain Cause (Adult)    Diarrhea is when stools are loose and watery. This can be caused by:    Viral infections    Bacterial infections    Food poisoning    Parasites    Irritable bowel syndrome (IBS)    Inflammatory bowel diseases such as ulcerative colitis, Crohn's disease, and celiac disease    Food intolerance, such as to lactose, the sugar found in milk and milk products    Reaction to medicines like antibiotics, laxatives, cancer drugs, and antacids  Along with diarrhea, you may also have:    Abdominal pain and cramping    Nausea and vomiting    Loss of bowel control    Fever and chills    Bloody stools  In some cases, antibiotics may help to treat diarrhea. You may have a stool sample test. This is done to see what is causing your diarrhea, and if antibiotics will help treat it. The results of a stool sample test may take up to 2 days. The healthcare provider may not give you antibiotics until he or she has the stool test results.  Diarrhea can cause dehydration. This is the loss of too much water and other fluids from the body. When this occurs, body fluid must be replaced. This can be done with oral rehydration solutions. Oral rehydration solutions are available at drugstores and grocery stores without a prescription. Sports drinks are not the best choice if you are very dehydrated. They have too much sugar and not enough electrolytes.  Home care  Follow all instructions given by your healthcare provider. Rest at home for the next 24 hours, or until you feel better. Avoid caffeine, tobacco, and alcohol. These can make diarrhea, cramping, and pain worse.  If taking medicines:    Over-the-counter nausea and diarrhea medicines are generally OK unless you experience fever or blood stool. Check with your doctor first in those circumstances.    You may use acetaminophen or NSAID medicines like ibuprofen or naproxen to reduce pain and fever. Don t use these if you have  chronic liver or kidney disease, or ever had a stomach ulcer or gastrointestinal bleeding. Don't use NSAID medicines if you are already taking one for another condition (like arthritis) or are on daily aspirin therapy (such as for heart disease or after a stroke). Talk with your healthcare provider first.    If antibiotics were prescribed, be sure you take them until they are finished. Don t stop taking them even when you feel better. Antibiotics must be taken as a full course.  To prevent the spread of illness:    Remember that washing with soap and water and using alcohol-based  is the best way to prevent the spread of infection. Dry your hands with a single use towel (like a paper towel).    Clean the toilet after each use.    Wash your hands before eating.    Wash your hands before and after preparing food. Keep in mind that people with diarrhea or vomiting should not prepare food for others.    Wash your hands after using cutting boards, countertops, and knives that have been in contact with raw foods.    Wash and then peel fruits and vegetables.    Keep uncooked meats away from cooked and ready-to-eat foods.    Use a food thermometer when cooking. Cook poultry to at least 165 F (74 C). Cook ground meat (beef, veal, pork, lamb) to at least 160 F (71 C). Cook fresh beef, veal, lamb, and pork to at least 145 F (63 C).    Don t eat raw or undercooked eggs (poached or kareem side up), poultry, meat, or unpasteurized milk and juices.  Food and drinks  The main goal while treating vomiting or diarrhea is to prevent dehydration. This is done by taking small amounts of liquids often.    Keep in mind that liquids are more important than food right now.    Drink only small amounts of liquids at a time.    Don t force yourself to eat, especially if you are having cramping, vomiting, or diarrhea. Don t eat large amounts at a time, even if you are hungry.    If you eat, avoid fatty, greasy, spicy, or fried  foods.    Don t eat dairy foods or drink milk if you have diarrhea. These can make diarrhea worse.  During the first 24 hours you can try:    Oral rehydration solutions.  Sports drinks may be used if you are not too dehydrated and are otherwise healthy.    Soft drinks without caffeine    Ginger ale    Water (plain or flavored)    Decaf tea or coffee    Clear broth, consommé, or bouillon    Gelatin, popsicles, or frozen fruit juice bars  The second 24 hours, if you are feeling better, you can add:    Hot cereal, plain toast, bread, rolls, or crackers    Plain noodles, rice, mashed potatoes, chicken noodle soup, or rice soup    Unsweetened canned fruit (no pineapple)    Bananas  As you recover:    Limit fat intake to less than 15 grams per day. Don t eat margarine, butter, oils, mayonnaise, sauces, gravies, fried foods, peanut butter, meat, poultry, or fish.    Limit fiber. Don t eat raw or cooked vegetables, fresh fruits except bananas, or bran cereals.    Limit caffeine and chocolate.    Limit dairy.    Don t use spices or seasonings except salt.    Go back to your normal diet over time, as you feel better and your symptoms improve.    If the symptoms come back, go back to a simple diet or clear liquids.  Follow-up care  Follow up with your healthcare provider, or as advised. If a stool sample was taken or cultures were done, call the healthcare provider for the results as instructed.  Call 911  Call 911 if you have any of these symptoms:    Trouble breathing    Confusion    Extreme drowsiness or trouble walking    Loss of consciousness    Rapid heart rate    Chest pain    Stiff neck    Seizure  When to seek medical advice  Call your healthcare provider right away if any of these occur:    Abdominal pain that gets worse    Constant lower right abdominal pain    Continued vomiting and inability to keep liquids down    Diarrhea more than 5 times a day    Blood in vomit or stool    Dark urine or no urine for 8 hours,  dry mouth and tongue, tiredness, weakness, or dizziness    Drowsiness    New rash    You don t get better in 2 to 3 days    Fever of 100.4 F (38 C) or higher, or as directed by your healthcare provider  Ailyn last reviewed this educational content on 6/1/2018 2000-2021 [employer]. All rights reserved. This information is not intended as a substitute for professional medical care. Always follow your healthcare professional's instructions.

## 2021-08-28 LAB
ALBUMIN SERPL-MCNC: 3.8 G/DL (ref 3.4–5)
ALP SERPL-CCNC: 54 U/L (ref 40–150)
ALT SERPL W P-5'-P-CCNC: 26 U/L (ref 0–50)
ANION GAP SERPL CALCULATED.3IONS-SCNC: 4 MMOL/L (ref 3–14)
AST SERPL W P-5'-P-CCNC: 18 U/L (ref 0–45)
BILIRUB SERPL-MCNC: 0.7 MG/DL (ref 0.2–1.3)
BUN SERPL-MCNC: 9 MG/DL (ref 7–30)
CALCIUM SERPL-MCNC: 9.1 MG/DL (ref 8.5–10.1)
CHLORIDE BLD-SCNC: 106 MMOL/L (ref 94–109)
CO2 SERPL-SCNC: 28 MMOL/L (ref 20–32)
CREAT SERPL-MCNC: 0.69 MG/DL (ref 0.52–1.04)
GFR SERPL CREATININE-BSD FRML MDRD: >90 ML/MIN/1.73M2
GLUCOSE BLD-MCNC: 79 MG/DL (ref 70–99)
POTASSIUM BLD-SCNC: 4 MMOL/L (ref 3.4–5.3)
PROT SERPL-MCNC: 7 G/DL (ref 6.8–8.8)
SODIUM SERPL-SCNC: 138 MMOL/L (ref 133–144)

## 2021-08-30 PROCEDURE — 87506 IADNA-DNA/RNA PROBE TQ 6-11: CPT

## 2021-08-30 PROCEDURE — 87209 SMEAR COMPLEX STAIN: CPT

## 2021-08-30 PROCEDURE — 87177 OVA AND PARASITES SMEARS: CPT

## 2021-08-30 PROCEDURE — 87493 C DIFF AMPLIFIED PROBE: CPT

## 2021-08-31 ENCOUNTER — APPOINTMENT (OUTPATIENT)
Dept: LAB | Facility: CLINIC | Age: 66
End: 2021-08-31
Payer: COMMERCIAL

## 2021-08-31 LAB
C COLI+JEJUNI+LARI FUSA STL QL NAA+PROBE: NOT DETECTED
C DIFF TOX B STL QL: NEGATIVE
EC STX1 GENE STL QL NAA+PROBE: NOT DETECTED
EC STX2 GENE STL QL NAA+PROBE: NOT DETECTED
NOROV GI+II ORF1-ORF2 JNC STL QL NAA+PR: NOT DETECTED
RVA NSP5 STL QL NAA+PROBE: NOT DETECTED
SALMONELLA SP RPOD STL QL NAA+PROBE: NOT DETECTED
SHIGELLA SP+EIEC IPAH STL QL NAA+PROBE: NOT DETECTED
V CHOL+PARA RFBL+TRKH+TNAA STL QL NAA+PR: NOT DETECTED
Y ENTERO RECN STL QL NAA+PROBE: NOT DETECTED

## 2021-09-01 ENCOUNTER — MYC MEDICAL ADVICE (OUTPATIENT)
Dept: FAMILY MEDICINE | Facility: CLINIC | Age: 66
End: 2021-09-01

## 2021-09-01 LAB — O+P STL MICRO: NEGATIVE

## 2021-09-01 NOTE — TELEPHONE ENCOUNTER
Returned call, spoke to pt by phone today, prescribed medications. Follow up with virtual visit appointment in 1 week.

## 2021-09-04 ENCOUNTER — HEALTH MAINTENANCE LETTER (OUTPATIENT)
Age: 66
End: 2021-09-04

## 2021-09-08 ENCOUNTER — VIRTUAL VISIT (OUTPATIENT)
Dept: FAMILY MEDICINE | Facility: CLINIC | Age: 66
End: 2021-09-08
Payer: COMMERCIAL

## 2021-09-08 DIAGNOSIS — M25.511 CHRONIC RIGHT SHOULDER PAIN: ICD-10-CM

## 2021-09-08 DIAGNOSIS — G89.29 CHRONIC RIGHT SHOULDER PAIN: ICD-10-CM

## 2021-09-08 DIAGNOSIS — E78.5 HYPERLIPIDEMIA WITH TARGET LDL LESS THAN 100: ICD-10-CM

## 2021-09-08 DIAGNOSIS — F51.01 PRIMARY INSOMNIA: ICD-10-CM

## 2021-09-08 DIAGNOSIS — M75.41 IMPINGEMENT SYNDROME OF SHOULDER REGION, RIGHT: ICD-10-CM

## 2021-09-08 DIAGNOSIS — R19.7 DIARRHEA OF PRESUMED INFECTIOUS ORIGIN: Primary | ICD-10-CM

## 2021-09-08 PROCEDURE — 99214 OFFICE O/P EST MOD 30 MIN: CPT | Mod: 95 | Performed by: INTERNAL MEDICINE

## 2021-09-08 NOTE — PROGRESS NOTES
Neli is a 66 year old who is being evaluated via a billable telephone visit.      What phone number would you like to be contacted at? [phone]  How would you like to obtain your AVS? Birdie    Layton Quinteros is a 66 year old who presents for the following health issues     HPI       Chief Complaint   Patient presents with     Follow Up     Diarrhea of presumed infectious origin          HPI:   Patient Neli Gregorio is a very pleasant 66 year old female with history of allergies today for telephone visit for follow up of multiple concerns including recent diarrhea symptoms, hyperlipidemia, chronic right shoulder pains, insomnia. No  fever or chills at this time. Patient also reports well controlled chronic insomnia symptoms at this time without requiring the trazodone medication. No chest pain, headaches, fever or chills at this time.       Current Medications:     Current Outpatient Medications   Medication Sig Dispense Refill     atorvastatin (LIPITOR) 40 MG tablet Take 0.5 tablets (20 mg) by mouth daily 45 tablet 3     BIOTIN PO Take 5,000 mcg by mouth daily       Cholecalciferol (VITAMIN D3 PO) Take 4,000 Units by mouth daily        desonide (DESOWEN) 0.05 % external cream Apply sparingly to perineal area two times daily for 14 days. 15 g 3     diphenoxylate-atropine (LOMOTIL) 2.5-0.025 MG tablet Take 1 tablet by mouth 2 times daily as needed for diarrhea 30 tablet 0     EPINEPHrine (ANY BX GENERIC EQUIV) 0.3 MG/0.3ML injection 2-pack Inject 0.3 mLs (0.3 mg) into the muscle once as needed for anaphylaxis 2 each 3     zinc gluconate 50 MG tablet Take 50 mg by mouth daily           Allergies:      Allergies   Allergen Reactions     Bee Venom Anaphylaxis     Cats Other (See Comments)     scratchy throat              Past Medical History:     Past Medical History:   Diagnosis Date     Abdominal pain      Atrophic vaginitis      Decreased libido 2015    trial of testosterone cream. Not interested in IM.      Hypercholesteraemia 2010     Hyperlipidemia     PATIENT STARTED LOVASTATIN      Insomnia, unspecified     Takes 1/4 tab of Ambien 5mg nightly.      Proctitis      Psoriasis     Dx originally by derm, mild disease. TCM prn.         Past Surgical History:     Past Surgical History:   Procedure Laterality Date     COLONOSCOPY       COLONOSCOPY N/A 2018    Procedure: INTRAOPERATIVE COLONOSCOPY;  Surgeon: Mary Grigsby MD;  Location:  OR     EYE SURGERY      lasik     HEMORRHOIDECTOMY INTERNAL N/A 2018    Procedure: HEMORRHOIDECTOMY INTERNAL;  Surgeon: Mary Grigsby MD;  Location:  OR     MAMMOPLASTY AUGMENTATION      Breast lift     ORTHOPEDIC SURGERY  2018    left shoulder repair         Family Medical History:     Family History   Problem Relation Age of Onset     Heart Disease Father      Hypertension Father      Hyperlipidemia Father      Alzheimer Disease Mother      Hyperlipidemia Mother      Osteoporosis Other         aunt     Osteoporosis Maternal Grandmother          Social History:     Social History     Socioeconomic History     Marital status:      Spouse name: Samuel     Number of children: 1     Years of education: Not on file     Highest education level: Not on file   Occupational History     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     Occupation:      Comment: [employer]   Tobacco Use     Smoking status: Former Smoker     Packs/day: 1.00     Years: 10.00     Pack years: 10.00     Types: Cigarettes     Start date: 1975     Quit date: 1985     Years since quittin.7     Smokeless tobacco: Never Used   Substance and Sexual Activity     Alcohol use: Yes     Alcohol/week: 0.0 standard drinks     Comment: 1/day     Drug use: No     Sexual activity: Yes     Partners: Male     Birth control/protection: Post-menopausal   Other Topics Concern     Parent/sibling w/ CABG, MI or angioplasty before 65F 55M? No   Social History  Narrative     Not on file     Social Determinants of Health     Financial Resource Strain:      Difficulty of Paying Living Expenses:    Food Insecurity:      Worried About Running Out of Food in the Last Year:      Ran Out of Food in the Last Year:    Transportation Needs:      Lack of Transportation (Medical):      Lack of Transportation (Non-Medical):    Physical Activity:      Days of Exercise per Week:      Minutes of Exercise per Session:    Stress:      Feeling of Stress :    Social Connections:      Frequency of Communication with Friends and Family:      Frequency of Social Gatherings with Friends and Family:      Attends Mosque Services:      Active Member of Clubs or Organizations:      Attends Club or Organization Meetings:      Marital Status:    Intimate Partner Violence:      Fear of Current or Ex-Partner:      Emotionally Abused:      Physically Abused:      Sexually Abused:            Review of System:     Constitutional: Negative for fever or chills  Skin: Negative for rashes  Ears/Nose/Throat: positive for nasal congestion, sore throat  Respiratory: positive for cough  Cardiovascular: Negative for chest pain  Gastrointestinal: Negative for nausea, vomiting, positive for diarrhea  Genitourinary: Negative for dysuria, hematuria  Musculoskeletal: positive for chronic right shoulder pains  Neurologic: Negative for headaches  Psychiatric: Negative for depression, anxiety  Hematologic/Lymphatic/Immunologic: Negative  Endocrine: Negative  Behavioral: Negative for tobacco use       Physical Exam:   There were no vitals taken for this visit.    RESP: no cough over the phone  NEURO: Alert & Oriented x 3.   PSYCH: mentation appears normal, affect normal        Diagnostic Test Results:     Diagnostic Test Results:  Labs reviewed in Epic    ASSESSMENT/PLAN:       (R19.7) Diarrhea of presumed infectious origin  (primary encounter diagnosis)  Comment: recent diarrhea likely due to viral syndrome  Plan:  Lomotil medication      (M25.511,  G89.29) Chronic right shoulder pain  (M75.41) Impingement syndrome of shoulder region, right  Comment: stable  Plan: continue current therapy      (E78.5) Hyperlipidemia with target LDL less than 100  Comment: stale  Plan: continue current therapy      (F51.01) Primary insomnia  Comment: stable  Plan: continue current therapy      Follow Up Plan:     Patient is instructed to return to Internal Medicine clinic for follow-up visit in 1 month.      Phone call duration: 30 minutes    Berna Goodson MD  Internal Medicine  [facility]

## 2021-09-08 NOTE — PROGRESS NOTES
"Neli is a 66 year old who is being evaluated via a billable video visit.      How would you like to obtain your AVS? MyChart  If the video visit is dropped, the invitation should be resent by: Text to cell phone: [phone]  Will anyone else be joining your video visit? No  {If patient encounters technical issues they should call [phone] :946538}    Video Start Time: {video visit start/end time for provider to select:152948}    {PROVIDER CHARTING PREFERENCE:095534}    Subjective   Neli is a 66 year old who presents for the following health issues {ACCOMPANIED BY STATEMENT (Optional):658518}    HPI       Chief Complaint   Patient presents with     Follow Up       {SUPERLIST (Optional):943176}  {additonal problems for provider to add (Optional):795504}    Review of Systems   {ROS COMP (Optional):251817}      Objective           Vitals:  No vitals were obtained today due to virtual visit.    Physical Exam   {video visit exam brief selected:700359::\"GENERAL: Healthy, alert and no distress\",\"EYES: Eyes grossly normal to inspection.  No discharge or erythema, or obvious scleral/conjunctival abnormalities.\",\"RESP: No audible wheeze, cough, or visible cyanosis.  No visible retractions or increased work of breathing.  \",\"SKIN: Visible skin clear. No significant rash, abnormal pigmentation or lesions.\",\"NEURO: Cranial nerves grossly intact.  Mentation and speech appropriate for age.\",\"PSYCH: Mentation appears normal, affect normal/bright, judgement and insight intact, normal speech and appearance well-groomed.\"}    {Diagnostic Test Results (Optional):843757}    {AMBULATORY ATTESTATION (Optional):170870}        Video-Visit Details    Type of service:  Video Visit    Video End Time:{video visit start/end time for provider to select:152948}    Originating Location (pt. Location): {video visit patient location:472466::\"Home\"}    Distant Location (provider location):  [employer]     Platform used for " "Video Visit: {Virtual Visit Platforms:068175::\"Lisa\"}  "

## 2021-09-10 ENCOUNTER — TELEPHONE (OUTPATIENT)
Dept: FAMILY MEDICINE | Facility: CLINIC | Age: 66
End: 2021-09-10

## 2021-09-10 NOTE — TELEPHONE ENCOUNTER
Patient called to report no improvement with diarrhea.  Was taking the immodium at home off and on for 3 and a half weeks.  Was prescribed Lomotil and started on the September 1st.  Has had 1 loose stool in the past 24 hours.  Unable to control bowel urge.    She believes this started when she had the covid vacine.    [phone] - ok to leave detailed     Bibiana Russell RN

## 2021-09-16 ENCOUNTER — VIRTUAL VISIT (OUTPATIENT)
Dept: FAMILY MEDICINE | Facility: CLINIC | Age: 66
End: 2021-09-16
Payer: COMMERCIAL

## 2021-09-16 DIAGNOSIS — E78.5 HYPERLIPIDEMIA WITH TARGET LDL LESS THAN 100: ICD-10-CM

## 2021-09-16 DIAGNOSIS — R19.7 DIARRHEA OF PRESUMED INFECTIOUS ORIGIN: Primary | ICD-10-CM

## 2021-09-16 DIAGNOSIS — M75.41 IMPINGEMENT SYNDROME OF SHOULDER REGION, RIGHT: ICD-10-CM

## 2021-09-16 DIAGNOSIS — F51.01 PRIMARY INSOMNIA: ICD-10-CM

## 2021-09-16 DIAGNOSIS — G89.29 CHRONIC RIGHT SHOULDER PAIN: ICD-10-CM

## 2021-09-16 DIAGNOSIS — M25.511 CHRONIC RIGHT SHOULDER PAIN: ICD-10-CM

## 2021-09-16 PROCEDURE — 99214 OFFICE O/P EST MOD 30 MIN: CPT | Mod: 95 | Performed by: INTERNAL MEDICINE

## 2021-09-16 NOTE — PROGRESS NOTES
Neli is a 66 year old who is being evaluated via a billable telephone visit.      What phone number would you like to be contacted at? [phone]  How would you like to obtain your AVS? Birdie    Layton Quinteros is a 66 year old who presents for the following health issues     HPI       Chief Complaint   Patient presents with     Diarrhea         HPI:   Patient Neli Gregorio is a very pleasant 66 year old female with history of allergies today for telephone visit for follow up of multiple concerns including recent persistent diarrhea symptoms, hyperlipidemia, chronic right shoulder pains, insomnia. Diarrhea symptoms are still not resolved with imodium, Lomotil medications. No  fever or chills at this time. Patient also reports well controlled chronic insomnia symptoms at this time without requiring the trazodone medication. No chest pain, headaches, fever or chills at this time.       Current Medications:     Current Outpatient Medications   Medication Sig Dispense Refill     atorvastatin (LIPITOR) 40 MG tablet Take 0.5 tablets (20 mg) by mouth daily 45 tablet 3     BIOTIN PO Take 5,000 mcg by mouth daily       Cholecalciferol (VITAMIN D3 PO) Take 4,000 Units by mouth daily        desonide (DESOWEN) 0.05 % external cream Apply sparingly to perineal area two times daily for 14 days. 15 g 3     diphenoxylate-atropine (LOMOTIL) 2.5-0.025 MG tablet Take 1 tablet by mouth 2 times daily as needed for diarrhea 30 tablet 0     EPINEPHrine (ANY BX GENERIC EQUIV) 0.3 MG/0.3ML injection 2-pack Inject 0.3 mLs (0.3 mg) into the muscle once as needed for anaphylaxis 2 each 3     zinc gluconate 50 MG tablet Take 50 mg by mouth daily           Allergies:      Allergies   Allergen Reactions     Bee Venom Anaphylaxis     Cats Other (See Comments)     scratchy throat              Past Medical History:     Past Medical History:   Diagnosis Date     Abdominal pain      Atrophic vaginitis      Decreased libido 2015    trial of  testosterone cream. Not interested in IM.     Hypercholesteraemia      Hyperlipidemia     PATIENT STARTED LOVASTATIN      Insomnia, unspecified     Takes 1/4 tab of Ambien 5mg nightly.      Proctitis      Psoriasis     Dx originally by derm, mild disease. TCM prn.         Past Surgical History:     Past Surgical History:   Procedure Laterality Date     COLONOSCOPY       COLONOSCOPY N/A 2018    Procedure: INTRAOPERATIVE COLONOSCOPY;  Surgeon: Mary Grigsby MD;  Location:  OR     EYE SURGERY      lasik     HEMORRHOIDECTOMY INTERNAL N/A 2018    Procedure: HEMORRHOIDECTOMY INTERNAL;  Surgeon: Mary Grigsby MD;  Location:  OR     MAMMOPLASTY AUGMENTATION      Breast lift     ORTHOPEDIC SURGERY  2018    left shoulder repair         Family Medical History:     Family History   Problem Relation Age of Onset     Heart Disease Father      Hypertension Father      Hyperlipidemia Father      Alzheimer Disease Mother      Hyperlipidemia Mother      Osteoporosis Other         aunt     Osteoporosis Maternal Grandmother          Social History:     Social History     Socioeconomic History     Marital status:      Spouse name: Samuel     Number of children: 1     Years of education: Not on file     Highest education level: Not on file   Occupational History     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 No   Social History Narrative     Not on file     Social Determinants of Health     Financial Resource Strain:      Difficulty of Paying Living Expenses:    Food Insecurity:      Worried About Running Out of Food in the Last Year:      Ran Out of Food in the Last Year:    Transportation Needs:      Lack of Transportation (Medical):      Lack of Transportation (Non-Medical):    Physical Activity:      Days of Exercise per Week:      Minutes of Exercise per Session:    Stress:      Feeling of Stress :    Social Connections:      Frequency of Communication with Friends and Family:      Frequency of Social Gatherings with Friends and Family:      Attends Orthodoxy Services:      Active Member of Clubs or Organizations:      Attends Club or Organization Meetings:      Marital Status:    Intimate Partner Violence:      Fear of Current or Ex-Partner:      Emotionally Abused:      Physically Abused:      Sexually Abused:            Review of System:     Constitutional: Negative for fever or chills  Skin: Negative for rashes  Ears/Nose/Throat: positive for nasal congestion, sore throat  Respiratory: positive for cough  Cardiovascular: Negative for chest pain  Gastrointestinal: Negative for nausea, vomiting, positive for persistent diarrhea symptoms recently  Genitourinary: Negative for dysuria, hematuria  Musculoskeletal: positive for chronic right shoulder pains  Neurologic: Negative for headaches  Psychiatric: Negative for depression, anxiety  Hematologic/Lymphatic/Immunologic: Negative  Endocrine: Negative  Behavioral: Negative for tobacco use       Physical Exam:   There were no vitals taken for this visit.    RESP: no cough over the phone  NEURO: Alert & Oriented x 3.   PSYCH: mentation appears normal, affect normal        Diagnostic Test Results:     Diagnostic Test Results:  Labs reviewed in Epic    ASSESSMENT/PLAN:       (R19.7) Diarrhea of presumed infectious origin  (primary encounter  diagnosis)  Comment: persistent recent diarrhea symptoms, not improving  Plan: continue Imodium, Lomotil medications  GI specialist clinic consult ordered today for further evaluation going forward.       (M25.511,  G89.29) Chronic right shoulder pain  (M75.41) Impingement syndrome of shoulder region, right  Comment: stable  Plan: continue current therapy      (E78.5) Hyperlipidemia with target LDL less than 100  Comment: stale  Plan: continue current therapy      (F51.01) Primary insomnia  Comment: stable  Plan: continue current therapy      Follow Up Plan:     Patient is instructed to return to Internal Medicine clinic for follow-up visit in 1 month.      Phone call duration: 30 minutes    Berna Goodson MD  Internal Medicine  [facility]

## 2021-09-22 DIAGNOSIS — R19.7 DIARRHEA OF PRESUMED INFECTIOUS ORIGIN: ICD-10-CM

## 2021-09-24 RX ORDER — DIPHENOXYLATE HCL/ATROPINE 2.5-.025MG
TABLET ORAL
Qty: 30 TABLET | Refills: 0 | Status: SHIPPED | OUTPATIENT
Start: 2021-09-24 | End: 2022-02-17

## 2021-09-24 NOTE — TELEPHONE ENCOUNTER
Routing refill request to provider for review/approval because:  Drug not on the [facility] refill protocol   Samantha White RN  [facility] Triage Nurse

## 2022-01-06 ENCOUNTER — OFFICE VISIT (OUTPATIENT)
Dept: FAMILY MEDICINE | Facility: CLINIC | Age: 67
End: 2022-01-06
Payer: COMMERCIAL

## 2022-01-06 VITALS
HEART RATE: 65 BPM | WEIGHT: 143 LBS | DIASTOLIC BLOOD PRESSURE: 91 MMHG | TEMPERATURE: 97.5 F | HEIGHT: 65 IN | OXYGEN SATURATION: 100 % | RESPIRATION RATE: 16 BRPM | SYSTOLIC BLOOD PRESSURE: 164 MMHG | BODY MASS INDEX: 23.82 KG/M2

## 2022-01-06 DIAGNOSIS — Z23 HIGH PRIORITY FOR 2019-NCOV VACCINE: ICD-10-CM

## 2022-01-06 DIAGNOSIS — M25.511 CHRONIC RIGHT SHOULDER PAIN: ICD-10-CM

## 2022-01-06 DIAGNOSIS — G89.29 CHRONIC RIGHT SHOULDER PAIN: ICD-10-CM

## 2022-01-06 DIAGNOSIS — F51.01 PRIMARY INSOMNIA: ICD-10-CM

## 2022-01-06 DIAGNOSIS — Z23 HIGH PRIORITY FOR 2019 NOVEL CORONAVIRUS VACCINATION: Primary | ICD-10-CM

## 2022-01-06 DIAGNOSIS — E78.5 HYPERLIPIDEMIA LDL GOAL <100: ICD-10-CM

## 2022-01-06 PROCEDURE — 0002A COVID-19,PF,PFIZER (12+ YRS): CPT | Performed by: INTERNAL MEDICINE

## 2022-01-06 PROCEDURE — 99214 OFFICE O/P EST MOD 30 MIN: CPT | Mod: 25 | Performed by: INTERNAL MEDICINE

## 2022-01-06 PROCEDURE — 91300 COVID-19,PF,PFIZER (12+ YRS): CPT | Performed by: INTERNAL MEDICINE

## 2022-01-06 ASSESSMENT — MIFFLIN-ST. JEOR: SCORE: 1199.52

## 2022-01-06 NOTE — PROGRESS NOTES
Subjective   Neli is a 66 year old who presents for the following health issues    HPI       Chief Complaint:     follow up of multiple concerns including COVID vaccine vaccination, hyperlipidemia, chronic right shoulder pains      HPI:   Patient Neli Gregorio is a very pleasant 66 year old female with history of allergies who presents to Internal Medicine clinic today for follow up of multiple concerns including COVID vaccine vaccination, hyperlipidemia, chronic right shoulder pains. No  fever or chills at this time. Patient also reports well controlled chronic insomnia symptoms at this time without requiring the trazodone medication. No chest pain, headaches, fever or chills at this time. patient requests the 2nd dose of the COVID vaccines at this time.        Current Medications:     Current Outpatient Medications   Medication Sig Dispense Refill     atorvastatin (LIPITOR) 40 MG tablet Take 0.5 tablets (20 mg) by mouth daily 45 tablet 3     BIOTIN PO Take 5,000 mcg by mouth daily       Cholecalciferol (VITAMIN D3 PO) Take 4,000 Units by mouth daily        desonide (DESOWEN) 0.05 % external cream Apply sparingly to perineal area two times daily for 14 days. 15 g 3     diphenoxylate-atropine (LOMOTIL) 2.5-0.025 MG tablet TAKE ONE TABLET BY MOUTH TWICE DAILY AS NEEDED for diarrhea 30 tablet 0     EPINEPHrine (ANY BX GENERIC EQUIV) 0.3 MG/0.3ML injection 2-pack Inject 0.3 mLs (0.3 mg) into the muscle once as needed for anaphylaxis 2 each 3     zinc gluconate 50 MG tablet Take 50 mg by mouth daily           Allergies:      Allergies   Allergen Reactions     Bee Venom Anaphylaxis     Cats Other (See Comments)     scratchy throat              Past Medical History:     Past Medical History:   Diagnosis Date     Abdominal pain      Atrophic vaginitis      Decreased libido 2015    trial of testosterone cream. Not interested in IM.     Hypercholesteraemia 2010     Hyperlipidemia     PATIENT STARTED LOVASTATIN 12/09      Insomnia, unspecified     Takes 1/4 tab of Ambien 5mg nightly.      Proctitis      Psoriasis     Dx originally by derm, mild disease. TCM prn.         Past Surgical History:     Past Surgical History:   Procedure Laterality Date     COLONOSCOPY       COLONOSCOPY N/A 2018    Procedure: INTRAOPERATIVE COLONOSCOPY;  Surgeon: Mary Grigsby MD;  Location:  OR     EYE SURGERY      lasik     HEMORRHOIDECTOMY INTERNAL N/A 2018    Procedure: HEMORRHOIDECTOMY INTERNAL;  Surgeon: Mary Grigsby MD;  Location:  OR     MAMMOPLASTY AUGMENTATION      Breast lift     ORTHOPEDIC SURGERY  2018    left shoulder repair         Family Medical History:     Family History   Problem Relation Age of Onset     Heart Disease Father      Hypertension Father      Hyperlipidemia Father      Alzheimer Disease Mother      Hyperlipidemia Mother      Osteoporosis Other         aunt     Osteoporosis Maternal Grandmother          Social History:     Social History     Socioeconomic History     Marital status:      Spouse name: Samuel     Number of children: Lalita     Years of education: Not on file     Highest education level: Not on file   Occupational History     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 No   Social History Narrative     Not on file     Social Determinants of Health     Financial Resource Strain:  Not on file   Food Insecurity: Not on file   Transportation Needs: Not on file   Physical Activity: Not on file   Stress: Not on file   Social Connections: Not on file   Intimate Partner Violence: Not on file   Housing Stability: Not on file           Review of System:     Constitutional: Negative for fever or chills  Skin: Negative for rashes  Ears/Nose/Throat: positive for nasal congestion, sore throat  Respiratory: positive for cough  Cardiovascular: Negative for chest pain  Gastrointestinal: Negative for nausea, vomiting  Genitourinary: Negative for dysuria, hematuria  Musculoskeletal: positive for chronic right shoulder pains  Neurologic: Negative for headaches  Psychiatric: Negative for depression, anxiety  Hematologic/Lymphatic/Immunologic: Negative  Endocrine: Negative  Behavioral: Negative for tobacco use       Physical Exam:   There were no vitals taken for this visit.    GENERAL: alert and no distress  EYES: eyes grossly normal to inspection, and conjunctivae and sclerae normal  HENT: Normocephalic atraumatic. Nose and mouth without ulcers or lesions  NECK: supple  RESP: lungs clear to auscultation   CV: regular rate and rhythm, normal S1 S2  LYMPH: no peripheral edema   ABDOMEN: nondistended  MS: chronic right shoulder pains again noted  SKIN: no suspicious lesions or rashes  NEURO: Alert & Oriented x 3.   PSYCH: mentation appears normal, affect normal        Diagnostic Test Results:     Diagnostic Test Results:  Labs reviewed in Epic    ASSESSMENT/PLAN:     (Z23) High priority for 2019 novel coronavirus vaccination  (primary encounter diagnosis)  Comment: patient requests the 2nd dose of the COVID vaccines at this time.  Plan: Pfizer COVID vaccine given in clinic today.      (M25.511,  G89.29) Chronic right shoulder pain  (M75.41) Impingement syndrome of shoulder region, right  Comment: stable  Plan: continue current therapy      (E78.5) Hyperlipidemia with target LDL less than 100  Comment:  stale  Plan: continue current therapy      (F51.01) Primary insomnia  Comment: well controlled without trazodone  Plan: discontinued traZODone (DESYREL) 50 MG tablet medication          Follow Up Plan:     Patient is instructed to return to Internal Medicine clinic for follow-up visit in 1 month.        Berna Goodson MD  Internal Medicine  [facility]

## 2022-02-17 ENCOUNTER — OFFICE VISIT (OUTPATIENT)
Dept: FAMILY MEDICINE | Facility: CLINIC | Age: 67
End: 2022-02-17
Payer: COMMERCIAL

## 2022-02-17 VITALS
RESPIRATION RATE: 16 BRPM | TEMPERATURE: 97.7 F | HEIGHT: 65 IN | WEIGHT: 145 LBS | DIASTOLIC BLOOD PRESSURE: 87 MMHG | BODY MASS INDEX: 24.16 KG/M2 | SYSTOLIC BLOOD PRESSURE: 141 MMHG | OXYGEN SATURATION: 100 % | HEART RATE: 67 BPM

## 2022-02-17 DIAGNOSIS — I10 PRIMARY HYPERTENSION: ICD-10-CM

## 2022-02-17 DIAGNOSIS — R05.9 COUGH: ICD-10-CM

## 2022-02-17 DIAGNOSIS — J06.9 UPPER RESPIRATORY TRACT INFECTION, UNSPECIFIED TYPE: Primary | ICD-10-CM

## 2022-02-17 DIAGNOSIS — E78.5 HYPERLIPIDEMIA WITH TARGET LDL LESS THAN 100: ICD-10-CM

## 2022-02-17 PROCEDURE — 99214 OFFICE O/P EST MOD 30 MIN: CPT | Performed by: INTERNAL MEDICINE

## 2022-02-17 RX ORDER — AMLODIPINE BESYLATE 2.5 MG/1
2.5 TABLET ORAL DAILY
Qty: 90 TABLET | Refills: 1 | Status: SHIPPED | OUTPATIENT
Start: 2022-02-17 | End: 2023-04-04

## 2022-02-17 RX ORDER — METHYLPREDNISOLONE 4 MG
TABLET, DOSE PACK ORAL
Qty: 21 TABLET | Refills: 1 | Status: SHIPPED | OUTPATIENT
Start: 2022-02-17 | End: 2022-06-13

## 2022-02-17 ASSESSMENT — PAIN SCALES - GENERAL: PAINLEVEL: NO PAIN (0)

## 2022-02-17 NOTE — PROGRESS NOTES
Subjective   Neli is a 66 year old who presents for the following health issues     HPI       Chief Complaint:     follow up of multiple concerns including hyperlipidemia, Blood pressure concern  Cough concern        HPI:   Patient Neli Gregorio is a very pleasant 66 year old female with history of allergies who presents to Internal Medicine clinic today for follow up of multiple concerns including recent cough, URI symptoms, hyperlipidemia, chronic hypertension. No fever or chills at this time. Patient also reports well controlled chronic insomnia symptoms at this time without requiring the trazodone medication. No chest pain, headaches, fever or chills at this time.       Current Medications:     Current Outpatient Medications   Medication Sig Dispense Refill     amLODIPine (NORVASC) 2.5 MG tablet Take 1 tablet (2.5 mg) by mouth daily 90 tablet 1     atorvastatin (LIPITOR) 40 MG tablet Take 0.5 tablets (20 mg) by mouth daily 45 tablet 3     BIOTIN PO Take 5,000 mcg by mouth daily       Cholecalciferol (VITAMIN D3 PO) Take 4,000 Units by mouth daily        desonide (DESOWEN) 0.05 % external cream Apply sparingly to perineal area two times daily for 14 days. 15 g 3     EPINEPHrine (ANY BX GENERIC EQUIV) 0.3 MG/0.3ML injection 2-pack Inject 0.3 mLs (0.3 mg) into the muscle once as needed for anaphylaxis 2 each 3     methylPREDNISolone (MEDROL DOSEPAK) 4 MG tablet therapy pack Follow Package Directions 21 tablet 1     zinc gluconate 50 MG tablet Take 50 mg by mouth daily           Allergies:      Allergies   Allergen Reactions     Bee Venom Anaphylaxis     Cats Other (See Comments)     scratchy throat              Past Medical History:     Past Medical History:   Diagnosis Date     Abdominal pain      Atrophic vaginitis      Decreased libido 2015    trial of testosterone cream. Not interested in IM.     Hypercholesteraemia 2010     Hyperlipidemia     PATIENT STARTED LOVASTATIN 12/09     Insomnia, unspecified      Takes 1/4 tab of Ambien 5mg nightly.      Proctitis      Psoriasis     Dx originally by derm, mild disease. TCM prn.         Past Surgical History:     Past Surgical History:   Procedure Laterality Date     COLONOSCOPY       COLONOSCOPY N/A 2018    Procedure: INTRAOPERATIVE COLONOSCOPY;  Surgeon: Mary Grigsby MD;  Location:  OR     EYE SURGERY      lasik     HEMORRHOIDECTOMY INTERNAL N/A 2018    Procedure: HEMORRHOIDECTOMY INTERNAL;  Surgeon: Mary Grigsby MD;  Location:  OR     MAMMOPLASTY AUGMENTATION      Breast lift     ORTHOPEDIC SURGERY  2018    left shoulder repair         Family Medical History:     Family History   Problem Relation Age of Onset     Heart Disease Father      Hypertension Father      Hyperlipidemia Father      Alzheimer Disease Mother      Hyperlipidemia Mother      Osteoporosis Other         aunt     Osteoporosis Maternal Grandmother          Social History:     Social History     Socioeconomic History     Marital status:      Spouse name: Samuel     Number of children: 1     Years of education: Not on file     Highest education level: Not on file   Occupational History     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 No   Social History Narrative     Not on file     Social Determinants of Health     Financial Resource Strain: Not on file   Food  "Insecurity: Not on file   Transportation Needs: Not on file   Physical Activity: Not on file   Stress: Not on file   Social Connections: Not on file   Intimate Partner Violence: Not on file   Housing Stability: Not on file           Review of System:     Constitutional: Negative for fever or chills  Skin: Negative for rashes  Ears/Nose/Throat: positive for nasal congestion, sore throat  Respiratory: positive for cough  Cardiovascular: Negative for chest pain  Gastrointestinal: Negative for nausea, vomiting  Genitourinary: Negative for dysuria, hematuria  Musculoskeletal: negative   Neurologic: Negative for headaches  Psychiatric: Negative for depression, anxiety  Hematologic/Lymphatic/Immunologic: Negative  Endocrine: Negative  Behavioral: Negative for tobacco use       Physical Exam:   BP (!) 141/87 (BP Location: Right arm, Patient Position: Sitting, Cuff Size: Adult Regular)   Pulse 67   Temp 97.7  F (36.5  C) (Temporal)   Resp 16   Ht 1.651 m (5' 5\")   Wt 65.8 kg (145 lb)   SpO2 100%   BMI 24.13 kg/m      GENERAL: alert and no distress  EYES: eyes grossly normal to inspection, and conjunctivae and sclerae normal  HENT: Normocephalic atraumatic. Nose and mouth without ulcers or lesions, nasal congestion present  NECK: supple  RESP: cough present  CV: regular rate and rhythm, normal S1 S2  LYMPH: no peripheral edema   ABDOMEN: nondistended  MS: negative  SKIN: no suspicious lesions or rashes  NEURO: Alert & Oriented x 3.   PSYCH: mentation appears normal, affect normal        Diagnostic Test Results:     Diagnostic Test Results:  Labs reviewed in Epic    ASSESSMENT/PLAN:   (J06.9) Upper respiratory tract infection, unspecified type  (primary encounter diagnosis)  (R05.9) Cough  Comment: recent cough, URI symptoms  Plan: methylPREDNISolone (MEDROL DOSEPAK) 4 MG tablet        therapy pack         (I10) Primary hypertension  Comment: BP is not at goal  Plan: amLODIPine (NORVASC) 2.5 MG tablet        (E78.5) " Hyperlipidemia with target LDL less than 100  Comment: stale  Plan: continue current therapy      (F51.01) Primary insomnia  Comment: well controlled without trazodone  Plan: discontinued traZODone (DESYREL) 50 MG tablet medication          Follow Up Plan:     Patient is instructed to return to Internal Medicine clinic for follow-up visit in 1 month.        Berna Goodson MD  Internal Medicine  [facility]

## 2022-03-21 ENCOUNTER — ANCILLARY PROCEDURE (OUTPATIENT)
Dept: MAMMOGRAPHY | Facility: CLINIC | Age: 67
End: 2022-03-21
Payer: COMMERCIAL

## 2022-03-21 DIAGNOSIS — Z12.31 VISIT FOR SCREENING MAMMOGRAM: ICD-10-CM

## 2022-03-21 PROCEDURE — 77063 BREAST TOMOSYNTHESIS BI: CPT | Mod: TC | Performed by: RADIOLOGY

## 2022-03-21 PROCEDURE — 77067 SCR MAMMO BI INCL CAD: CPT | Mod: TC | Performed by: RADIOLOGY

## 2022-03-22 ENCOUNTER — OFFICE VISIT (OUTPATIENT)
Dept: OBGYN | Facility: CLINIC | Age: 67
End: 2022-03-22
Payer: COMMERCIAL

## 2022-03-22 VITALS
WEIGHT: 146.6 LBS | SYSTOLIC BLOOD PRESSURE: 122 MMHG | BODY MASS INDEX: 24.43 KG/M2 | HEIGHT: 65 IN | DIASTOLIC BLOOD PRESSURE: 62 MMHG

## 2022-03-22 DIAGNOSIS — E78.00 HYPERCHOLESTEROLEMIA: ICD-10-CM

## 2022-03-22 DIAGNOSIS — Z91.030 ALLERGY TO HONEY BEE VENOM: ICD-10-CM

## 2022-03-22 DIAGNOSIS — Z01.419 ENCOUNTER FOR GYNECOLOGICAL EXAMINATION WITHOUT ABNORMAL FINDING: Primary | ICD-10-CM

## 2022-03-22 PROCEDURE — 80061 LIPID PANEL: CPT | Performed by: OBSTETRICS & GYNECOLOGY

## 2022-03-22 PROCEDURE — 99396 PREV VISIT EST AGE 40-64: CPT | Performed by: OBSTETRICS & GYNECOLOGY

## 2022-03-22 PROCEDURE — 36415 COLL VENOUS BLD VENIPUNCTURE: CPT | Performed by: OBSTETRICS & GYNECOLOGY

## 2022-03-22 RX ORDER — ATORVASTATIN CALCIUM 40 MG/1
40 TABLET, FILM COATED ORAL DAILY
Qty: 90 TABLET | Refills: 3 | Status: SHIPPED | OUTPATIENT
Start: 2022-03-22 | End: 2023-03-23

## 2022-03-22 RX ORDER — EPINEPHRINE 0.3 MG/.3ML
0.3 INJECTION SUBCUTANEOUS
Qty: 2 EACH | Refills: 3 | Status: SHIPPED | OUTPATIENT
Start: 2022-03-22

## 2022-03-22 ASSESSMENT — PATIENT HEALTH QUESTIONNAIRE - PHQ9
5. POOR APPETITE OR OVEREATING: NOT AT ALL
SUM OF ALL RESPONSES TO PHQ QUESTIONS 1-9: 0

## 2022-03-22 ASSESSMENT — ANXIETY QUESTIONNAIRES
2. NOT BEING ABLE TO STOP OR CONTROL WORRYING: NOT AT ALL
3. WORRYING TOO MUCH ABOUT DIFFERENT THINGS: NOT AT ALL
GAD7 TOTAL SCORE: 0
6. BECOMING EASILY ANNOYED OR IRRITABLE: NOT AT ALL
5. BEING SO RESTLESS THAT IT IS HARD TO SIT STILL: NOT AT ALL
IF YOU CHECKED OFF ANY PROBLEMS ON THIS QUESTIONNAIRE, HOW DIFFICULT HAVE THESE PROBLEMS MADE IT FOR YOU TO DO YOUR WORK, TAKE CARE OF THINGS AT HOME, OR GET ALONG WITH OTHER PEOPLE: NOT DIFFICULT AT ALL
1. FEELING NERVOUS, ANXIOUS, OR ON EDGE: NOT AT ALL
7. FEELING AFRAID AS IF SOMETHING AWFUL MIGHT HAPPEN: NOT AT ALL

## 2022-03-22 NOTE — PROGRESS NOTES
Neli is a 66 year old  female who presents for annual exam.     Besides routine health maintenance, she has no other health concerns today .    HPI:  The patient's PCP is  Berna Goodson MD.    Patient is doing well  Thinking about alf  Check lipids, refill her statin and epi pen  She takes 1/2 tab daily on the statin by breaking in half  Has a PCP  Last pap , plan final one at 67  1 child      GYNECOLOGIC HISTORY:    No LMP recorded. Patient is postmenopausal.    Her current contraception method is: menopause.  She  reports that she quit smoking about 37 years ago. Her smoking use included cigarettes. She started smoking about 47 years ago. She has a 10.00 pack-year smoking history. She has never used smokeless tobacco.    Patient is sexually active.  STD testing offered?  Declined  Last PHQ-9 score on record =   PHQ-9 SCORE 3/22/2022   PHQ-9 Total Score 0     Last GAD7 score on record =   GRIFFIN-7 SCORE 3/22/2022   Total Score 0     Alcohol Score = 4    HEALTH MAINTENANCE:  Cholesterol:   Recent Labs   Lab Test 20  0859 19  0841   CHOL 184 168   HDL 67 70   LDL 99 87   TRIG 91 57        Last Mammo: 3/21/2022, Result: pending, Next Mammo: one year   Pap:   Lab Results   Component Value Date    PAP NIL 2019      Colonoscopy:  2018, Result: Normal, Next Colonoscopy: due    Dexa:  2013    Health maintenance updated:  yes    HISTORY:  OB History    Para Term  AB Living   2 1 1 0 1 1   SAB IAB Ectopic Multiple Live Births   0 1 0 0 1      # Outcome Date GA Lbr Delonte/2nd Weight Sex Delivery Anes PTL Lv   2 Term 87 39w0d  3.345 kg (7 lb 6 oz) M Vag-Vacuum   MAX   1 IAB                Patient Active Problem List   Diagnosis     Hyperlipidemia with target LDL less than 100     Impingement syndrome of shoulder region, right     Past Surgical History:   Procedure Laterality Date     COLONOSCOPY       COLONOSCOPY N/A 2018    Procedure: INTRAOPERATIVE  COLONOSCOPY;  Surgeon: Mary Grigsby MD;  Location:  OR     EYE SURGERY      lasik     HEMORRHOIDECTOMY INTERNAL N/A 2018    Procedure: HEMORRHOIDECTOMY INTERNAL;  Surgeon: Mary Grigsby MD;  Location:  OR     MAMMOPLASTY AUGMENTATION      Breast lift     ORTHOPEDIC SURGERY  2018    left shoulder repair      Social History     Tobacco Use     Smoking status: Former Smoker     Packs/day: 1.00     Years: 10.00     Pack years: 10.00     Types: Cigarettes     Start date: 1975     Quit date: 1985     Years since quittin.2     Smokeless tobacco: Never Used   Substance Use Topics     Alcohol use: Yes     Alcohol/week: 0.0 standard drinks     Comment: 1/day      Problem (# of Occurrences) Relation (Name,Age of Onset)    Alzheimer Disease (1) Mother (Leticia)    Heart Disease (1) Father (Timmy)    Hyperlipidemia (2) Father (Timmy), Mother (Leticia)    Hypertension (1) Father (Timmy)    Osteoporosis (2) Other (Catherine): aunt, Maternal Grandmother (Catherine)            Current Outpatient Medications   Medication Sig     amLODIPine (NORVASC) 2.5 MG tablet Take 1 tablet (2.5 mg) by mouth daily     atorvastatin (LIPITOR) 40 MG tablet Take 0.5 tablets (20 mg) by mouth daily     BIOTIN PO Take 5,000 mcg by mouth daily     Cholecalciferol (VITAMIN D3 PO) Take 4,000 Units by mouth daily      desonide (DESOWEN) 0.05 % external cream Apply sparingly to perineal area two times daily for 14 days.     EPINEPHrine (ANY BX GENERIC EQUIV) 0.3 MG/0.3ML injection 2-pack Inject 0.3 mLs (0.3 mg) into the muscle once as needed for anaphylaxis     methylPREDNISolone (MEDROL DOSEPAK) 4 MG tablet therapy pack Follow Package Directions     zinc gluconate 50 MG tablet Take 50 mg by mouth daily     No current facility-administered medications for this visit.     Allergies   Allergen Reactions     Bee Venom Anaphylaxis     Cats Other (See Comments)     scratchy throat         Past medical, surgical, social and  "family histories were reviewed and updated in EPIC.    ROS:   12 point review of systems negative other than symptoms noted below or in the HPI.  No urinary frequency or dysuria, bladder or kidney problems    EXAM:  Ht 1.645 m (5' 4.75\")   Wt 66.5 kg (146 lb 9.6 oz)   Breastfeeding No   BMI 24.58 kg/m     BMI: Body mass index is 24.58 kg/m .    PHYSICAL EXAM:  Constitutional:   Appearance: Well nourished, well developed, alert, in no acute distress  Neck:  Lymph Nodes:  No lymphadenopathy present    Thyroid:  Gland size normal, nontender, no nodules or masses present  on palpation  Chest:  Respiratory Effort:  Breathing unlabored  Cardiovascular:    Heart: Auscultation:  Regular rate, normal rhythm, no murmurs present  Breasts: Inspection of Breasts:  No lymphadenopathy present., Palpation of Breasts and Axillae:  No masses present on palpation, no breast tenderness., Axillary Lymph Nodes:  No lymphadenopathy present. and No nodularity, asymmetry or nipple discharge bilaterally.  Gastrointestinal:   Abdominal Examination:  Abdomen nontender to palpation, tone normal without rigidity or guarding, no masses present, umbilicus without lesions   Liver and Spleen:  No hepatomegaly present, liver nontender to palpation    Hernias:  No hernias present  Lymphatic: Lymph Nodes:  No other lymphadenopathy present  Skin:  General Inspection:  No rashes present, no lesions present, no areas of  discoloration  Neurologic:    Mental Status:  Oriented X3.  Normal strength and tone, sensory exam                grossly normal, mentation intact and speech normal.    Psychiatric:   Mentation appears normal and affect normal/bright.         Pelvic Exam:  External Genitalia:     Normal appearance for age, no discharge present, no tenderness present, no inflammatory lesions present, color normal  Vagina:     Normal vaginal vault without central or paravaginal defects, no discharge present, no inflammatory lesions present, no masses " present  Bladder:     Nontender to palpation  Urethra:   Urethral Body:  Urethra palpation normal, urethra structural support normal   Urethral Meatus:  No erythema or lesions present  Cervix:     Appearance healthy, no lesions present, nontender to palpation, no bleeding present  Uterus:     Uterus: firm, normal sized and nontender, midplane in position.   Adnexa:     No adnexal tenderness present, no adnexal masses present  Perineum:     Perineum within normal limits, no evidence of trauma, no rashes or skin lesions present  Anus:     Anus within normal limits, no hemorrhoids present  Inguinal Lymph Nodes:     No lymphadenopathy present  Pubic Hair:     Normal pubic hair distribution for age  Genitalia and Groin:     No rashes present, no lesions present, no areas of discoloration, no masses present      COUNSELING:   Reviewed preventive health counseling, as reflected in patient instructions    BMI: Body mass index is 24.58 kg/m .      ASSESSMENT:  66 year old female with satisfactory annual exam.    ICD-10-CM    1. Encounter for gynecological examination without abnormal finding  Z01.419        PLAN:  Return 1 year  Discussed my USP    Nuvia Riley MD

## 2022-03-23 ENCOUNTER — MYC MEDICAL ADVICE (OUTPATIENT)
Dept: FAMILY MEDICINE | Facility: CLINIC | Age: 67
End: 2022-03-23
Payer: COMMERCIAL

## 2022-03-23 DIAGNOSIS — E78.5 HYPERLIPIDEMIA LDL GOAL <100: Primary | ICD-10-CM

## 2022-03-23 LAB
CHOLEST SERPL-MCNC: 236 MG/DL
FASTING STATUS PATIENT QL REPORTED: YES
HDLC SERPL-MCNC: 78 MG/DL
LDLC SERPL CALC-MCNC: 143 MG/DL
NONHDLC SERPL-MCNC: 158 MG/DL
TRIGL SERPL-MCNC: 74 MG/DL

## 2022-03-23 ASSESSMENT — ANXIETY QUESTIONNAIRES: GAD7 TOTAL SCORE: 0

## 2022-03-24 NOTE — TELEPHONE ENCOUNTER
.  Berna Goodson MD  [facility] Triage; Dorian Sullivan CMA 1 hour ago (8:18 AM)     XH    Should I wait a few weeks and come in for another lipid panel?     Yes, please advise pt to schedule repeat fasting lipid panel lab and virtual visit appointment with me for further evaluation. Repeat lipid panel lab ordered in Epic.

## 2022-04-01 ENCOUNTER — LAB (OUTPATIENT)
Dept: LAB | Facility: CLINIC | Age: 67
End: 2022-04-01
Payer: COMMERCIAL

## 2022-04-01 DIAGNOSIS — E78.5 HYPERLIPIDEMIA LDL GOAL <100: ICD-10-CM

## 2022-04-01 PROCEDURE — 80061 LIPID PANEL: CPT

## 2022-04-01 PROCEDURE — 36415 COLL VENOUS BLD VENIPUNCTURE: CPT

## 2022-04-02 LAB
CHOLEST SERPL-MCNC: 231 MG/DL
FASTING STATUS PATIENT QL REPORTED: YES
HDLC SERPL-MCNC: 72 MG/DL
LDLC SERPL CALC-MCNC: 144 MG/DL
NONHDLC SERPL-MCNC: 159 MG/DL
TRIGL SERPL-MCNC: 73 MG/DL

## 2022-04-04 ENCOUNTER — MYC MEDICAL ADVICE (OUTPATIENT)
Dept: FAMILY MEDICINE | Facility: CLINIC | Age: 67
End: 2022-04-04
Payer: COMMERCIAL

## 2022-04-07 NOTE — TELEPHONE ENCOUNTER
Berna Goodson MD  [facility] Triage 22 hours ago (10:30 AM)     XH    Returned call, spoke to pt by phone, answered questions    Message text

## 2022-05-18 ENCOUNTER — TRANSFERRED RECORDS (OUTPATIENT)
Dept: HEALTH INFORMATION MANAGEMENT | Facility: CLINIC | Age: 67
End: 2022-05-18
Payer: COMMERCIAL

## 2022-06-13 ENCOUNTER — VIRTUAL VISIT (OUTPATIENT)
Dept: FAMILY MEDICINE | Facility: CLINIC | Age: 67
End: 2022-06-13
Payer: COMMERCIAL

## 2022-06-13 DIAGNOSIS — J06.9 UPPER RESPIRATORY TRACT INFECTION, UNSPECIFIED TYPE: Primary | ICD-10-CM

## 2022-06-13 DIAGNOSIS — R93.89 ABNORMAL CXR: ICD-10-CM

## 2022-06-13 DIAGNOSIS — E78.5 HYPERLIPIDEMIA LDL GOAL <100: ICD-10-CM

## 2022-06-13 DIAGNOSIS — I10 ESSENTIAL HYPERTENSION: ICD-10-CM

## 2022-06-13 DIAGNOSIS — K86.2 PANCREATIC CYST: ICD-10-CM

## 2022-06-13 DIAGNOSIS — R05.9 COUGH: ICD-10-CM

## 2022-06-13 DIAGNOSIS — R91.8 PULMONARY NODULES: ICD-10-CM

## 2022-06-13 PROCEDURE — 99214 OFFICE O/P EST MOD 30 MIN: CPT | Mod: TEL | Performed by: INTERNAL MEDICINE

## 2022-06-13 RX ORDER — PREDNISONE 20 MG/1
TABLET ORAL
Qty: 20 TABLET | Refills: 0 | Status: SHIPPED | OUTPATIENT
Start: 2022-06-13 | End: 2022-08-29 | Stop reason: ALTCHOICE

## 2022-06-13 RX ORDER — AZITHROMYCIN 250 MG/1
TABLET, FILM COATED ORAL
Qty: 6 TABLET | Refills: 0 | Status: SHIPPED | OUTPATIENT
Start: 2022-06-13 | End: 2022-06-18

## 2022-06-13 NOTE — PROGRESS NOTES
Neli is a 67 year old who is being evaluated via a billable telephone visit.      What phone number would you like to be contacted at? [phone]  How would you like to obtain your AVS? Birdie      Layton Quinteros is a 67 year old who presents for the following health issues  accompanied by her spouse.    HPI       Chief Complaint   Patient presents with     URI         Chief Complaint:   multiple concerns including hyperlipidemia, Blood pressure concern  Cough, URI concerns        HPI:   Patient Neli Gregorio is a very pleasant 66 year old female with history of allergies, hypertension, hyperlipidemia today for telephone visit for follow up of multiple concerns including recent cough, URI symptoms, hyperlipidemia, chronic hypertension. No fever or chills at this time. Patient also reports well controlled chronic insomnia symptoms at this time without requiring the trazodone medication. No chest pain, headaches, fever or chills at this time.       Current Medications:     Current Outpatient Medications   Medication Sig Dispense Refill     amLODIPine (NORVASC) 2.5 MG tablet Take 1 tablet (2.5 mg) by mouth daily 90 tablet 1     atorvastatin (LIPITOR) 40 MG tablet Take 1 tablet (40 mg) by mouth daily 90 tablet 3     azithromycin (ZITHROMAX) 250 MG tablet Take 2 tablets (500 mg) by mouth daily for 1 day, THEN 1 tablet (250 mg) daily for 4 days. 6 tablet 0     BIOTIN PO Take 5,000 mcg by mouth daily       Cholecalciferol (VITAMIN D3 PO) Take 4,000 Units by mouth daily        desonide (DESOWEN) 0.05 % external cream Apply sparingly to perineal area two times daily for 14 days. 15 g 3     EPINEPHrine (ANY BX GENERIC EQUIV) 0.3 MG/0.3ML injection 2-pack Inject 0.3 mLs (0.3 mg) into the muscle once as needed for anaphylaxis 2 each 3     predniSONE (DELTASONE) 20 MG tablet Take 3 tabs by mouth daily x 3 days, then 2 tabs daily x 3 days, then 1 tab daily x 3 days, then 1/2 tab daily x 3 days. 20 tablet 0     zinc  gluconate 50 MG tablet Take 50 mg by mouth daily           Allergies:      Allergies   Allergen Reactions     Bee Venom Anaphylaxis     Cats Other (See Comments)     scratchy throat              Past Medical History:     Past Medical History:   Diagnosis Date     Abdominal pain      Atrophic vaginitis      Decreased libido 2015    trial of testosterone cream. Not interested in IM.     Hypercholesteraemia 2010     Hyperlipidemia     PATIENT STARTED LOVASTATIN 12/09     Insomnia, unspecified     Takes 1/4 tab of Ambien 5mg nightly.      Proctitis      Psoriasis     Dx originally by derm, mild disease. TCM prn.         Past Surgical History:     Past Surgical History:   Procedure Laterality Date     COLONOSCOPY       COLONOSCOPY N/A 12/21/2018    Procedure: INTRAOPERATIVE COLONOSCOPY;  Surgeon: Mary Grigsby MD;  Location:  OR     EYE SURGERY  2004    lasik     HEMORRHOIDECTOMY INTERNAL N/A 12/21/2018    Procedure: HEMORRHOIDECTOMY INTERNAL;  Surgeon: Mary Grigsby MD;  Location:  OR     MAMMOPLASTY AUGMENTATION  2007    Breast lift     ORTHOPEDIC SURGERY  2018    left shoulder repair         Family Medical History:     Family History   Problem Relation Age of Onset     Heart Disease Father      Hypertension Father      Hyperlipidemia Father      Alzheimer Disease Mother      Hyperlipidemia Mother      Osteoporosis Other         aunt     Osteoporosis Maternal Grandmother          Social History:     Social History     Socioeconomic History     Marital status:      Spouse name: Samuel     Number of children: 1     Years of education: Not on file     Highest education level: Not on file   Occupational History     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 No   Social History Narrative     Not on file     Social Determinants of Health     Financial Resource Strain: Not on file   Food Insecurity: Not on file   Transportation Needs: Not on file   Physical Activity: Not on file   Stress: Not on file   Social Connections: Not on file   Intimate Partner Violence: Not on file   Housing Stability: Not on file           Review of System:     Constitutional: Negative for fever or chills  Skin: Negative for rashes  Ears/Nose/Throat: positive for nasal congestion, sore throat  Respiratory: positive for cough  Cardiovascular: Negative for chest pain  Gastrointestinal: Negative for nausea, vomiting  Genitourinary: Negative for dysuria, hematuria  Musculoskeletal: negative   Neurologic: Negative for headaches  Psychiatric: positive for insomnia  Hematologic/Lymphatic/Immunologic: Negative  Endocrine: Negative  Behavioral: Negative for tobacco use       Physical Exam:   There were no vitals taken for this visit.      RESP: cough present  NEURO: Alert & Oriented x 3.   PSYCH: mentation appears normal, affect normal        Diagnostic Test Results:     Diagnostic Test Results:  Labs reviewed in [facility]    CHEST TWO VIEWS  6/15/2022 8:09 AM      HISTORY: Upper respiratory tract infection, unspecified type.     COMPARISON: None.     FINDINGS: Solitary pulmonary nodule projected over the right lower  lobe measures 1.2 cm. Lungs otherwise clear. No pneumothorax or  pleural effusion.                                                                       IMPRESSION: Solitary pulmonary nodule. CT scan of the chest  recommended for further  evaluation.      MARLEN OLIVIER MD         SYSTEM ID:  [id number]      CT CHEST W CONTRAST 6/15/2022 12:18 PM     CLINICAL HISTORY: Abnormal xray - lung nodule, >= 1 cm; abnormal CXR;  Abnormal CXR; Pulmonary nodules  TECHNIQUE: CT chest with IV contrast. Multiplanar reformats were  obtained. Dose reduction techniques were used.     CONTRAST:  64 mL isovue 370     COMPARISON: Same-day chest x-ray     FINDINGS:   LUNGS AND PLEURA: Biapical scarring. There is small focus of  consolidation in the right middle lobe which likely corresponds to  findings on same-day chest radiograph (series 5 image 222). Incidental  3 mm nodule in the left lower lobe (series 5 image 221). Triangular  nodule in the left upper lobe abutting the major fissure, most likely  an intrapulmonary lymph node (series 5 image 61). No pleural effusion.     MEDIASTINUM/AXILLAE: No lymphadenopathy. No thoracic aneurysm.     CORONARY ARTERY CALCIFICATION: None.     UPPER ABDOMEN: Well-circumscribed cystic lesions in the pancreas  measuring 0.9 and 0.7 cm respectively (series 4 images 152 and 148).  No definite solid mass or ductal dilation. Well-circumscribed cystic  lesion in the right lobe of the liver, likely benign cyst or  hemangioma, no specific follow-up recommended.     MUSCULOSKELETAL: No acute bony abnormality.                                                                      IMPRESSION:   1.  Small focus of airspace consolidation in the right middle lobe  which likely corresponds to findings on same day chest radiograph,  likely infectious/inflammatory etiology.  2.  Incidental 3 mm left lower lobe pulmonary nodule and likely  intrapulmonary lymph node in the left upper lobe. Recommend follow-up  described below.  3.  Incidental cystic pancreatic lesions, largest measuring 0.9 cm,  most likely side branch IPMNs. Recommend MRCP in 6-12 months to  document size stability.     REFERENCE:  Guidelines for Management of Incidental Pulmonary  Nodules Detected on  CT Images: From the Fleischner Society 2017.   Guidelines apply to incidental nodules in patients who are 35 years or  older.  Guidelines do not apply to lung cancer screening, patients with  immunosuppression, or patients with known primary cancer.     MULTIPLE NODULES  Nodule size <6 mm  Low-risk patients: No follow-up needed.  High-risk patients: Optional follow-up at 12 months.     Findings discussed with Dr. Goodson at 1:21 PM on 6/15/2022 by Dr. Jose De Jesus RAMÍREZ MD         SYSTEM ID:  [id number]    ASSESSMENT/PLAN:   Neli was seen today for uri.    Diagnoses and all orders for this visit:    Upper respiratory tract infection, unspecified type    Abnormal CXR  -     CT Chest w Contrast; Future    Pulmonary nodules  -     CT Chest w Contrast; Future    Pancreatic cyst  -     Adult Gastro Ref - Consult Only; Future    Cough    Essential hypertension    Hyperlipidemia LDL goal <100    Other orders  -     REVIEW OF HEALTH MAINTENANCE PROTOCOL ORDERS  -     azithromycin (ZITHROMAX) 250 MG tablet; Take 2 tablets (500 mg) by mouth daily for 1 day, THEN 1 tablet (250 mg) daily for 4 days.  -     predniSONE (DELTASONE) 20 MG tablet; Take 3 tabs by mouth daily x 3 days, then 2 tabs daily x 3 days, then 1 tab daily x 3 days, then 1/2 tab daily x 3 days.      Primary hypertension  - continue amLODIPine (NORVASC) 2.5 MG tablet      Hyperlipidemia with target LDL less than 100  - continue current therapy      Follow Up Plan:     Patient is instructed to return to Internal Medicine clinic for follow-up visit in 1 month.        Berna Goodson MD  Internal Medicine  [facility]        Phone call duration: 30 minutes

## 2022-06-15 ENCOUNTER — ANCILLARY PROCEDURE (OUTPATIENT)
Dept: GENERAL RADIOLOGY | Facility: CLINIC | Age: 67
End: 2022-06-15
Attending: INTERNAL MEDICINE
Payer: COMMERCIAL

## 2022-06-15 ENCOUNTER — HOSPITAL ENCOUNTER (OUTPATIENT)
Dept: CT IMAGING | Facility: CLINIC | Age: 67
Discharge: HOME | End: 2022-06-15
Attending: INTERNAL MEDICINE | Admitting: INTERNAL MEDICINE
Payer: COMMERCIAL

## 2022-06-15 ENCOUNTER — TELEPHONE (OUTPATIENT)
Dept: FAMILY MEDICINE | Facility: CLINIC | Age: 67
End: 2022-06-15

## 2022-06-15 ENCOUNTER — TELEPHONE (OUTPATIENT)
Dept: GASTROENTEROLOGY | Facility: CLINIC | Age: 67
End: 2022-06-15

## 2022-06-15 DIAGNOSIS — R91.8 PULMONARY NODULES: ICD-10-CM

## 2022-06-15 DIAGNOSIS — J06.9 UPPER RESPIRATORY TRACT INFECTION, UNSPECIFIED TYPE: ICD-10-CM

## 2022-06-15 DIAGNOSIS — R93.89 ABNORMAL CXR: ICD-10-CM

## 2022-06-15 PROCEDURE — 71046 X-RAY EXAM CHEST 2 VIEWS: CPT | Mod: TC | Performed by: RADIOLOGY

## 2022-06-15 PROCEDURE — 71260 CT THORAX DX C+: CPT

## 2022-06-15 PROCEDURE — 250N000011 HC RX IP 250 OP 636: Performed by: INTERNAL MEDICINE

## 2022-06-15 PROCEDURE — 250N000009 HC RX 250: Performed by: INTERNAL MEDICINE

## 2022-06-15 RX ORDER — IOPAMIDOL 755 MG/ML
64 INJECTION, SOLUTION INTRAVASCULAR ONCE
Status: COMPLETED | OUTPATIENT
Start: 2022-06-15 | End: 2022-06-15

## 2022-06-15 RX ADMIN — SODIUM CHLORIDE 60 ML: 9 INJECTION, SOLUTION INTRAVENOUS at 12:01

## 2022-06-15 RX ADMIN — IOPAMIDOL 64 ML: 755 INJECTION, SOLUTION INTRAVENOUS at 12:01

## 2022-06-15 NOTE — TELEPHONE ENCOUNTER
Advanced Endoscopy     Referring provider:  Berna Goodson MD in  [facility]    Referred to: [facility]     Provider Requested:  NA     Referral Received: 06/15/22     Records received: Epic     Images received: PACS    Evaluation for: pancreatic cyst, incidental finding     Clinical History (per RN review):      pleasant 66 year old female with history of allergies, hypertension, hyperlipidemia today for telephone visit for follow up of multiple concerns including recent cough, URI symptoms, hyperlipidemia, chronic hypertension    CT chest done after CRX showed pulmonary nodules- CXR done r/t URI    CT Chest 06/15/22                                                                  IMPRESSION:   1.  Small focus of airspace consolidation in the right middle lobe  which likely corresponds to findings on same day chest radiograph,  likely infectious/inflammatory etiology.  2.  Incidental 3 mm left lower lobe pulmonary nodule and likely  intrapulmonary lymph node in the left upper lobe. Recommend follow-up  described below.  3.  Incidental cystic pancreatic lesions, largest measuring 0.9 cm,  most likely side branch IPMNs. Recommend MRCP in 6-12 months to  document size stability.      MD review date: 06/15/22    MD Decision for clinic consultation/Orders:            Referral updates/Patient contacted:

## 2022-06-15 NOTE — TELEPHONE ENCOUNTER
Dr. Goodson, patient calling back into clinic to state her CT has been completed.     Office visit today.     Ly Sebastian RN  [facility]

## 2022-06-16 NOTE — TELEPHONE ENCOUNTER
Per Dr. Huggins  Clinic visit only; will need serial imaging for the time being    Message sent to clinic coordinators    ML

## 2022-07-28 ENCOUNTER — VIRTUAL VISIT (OUTPATIENT)
Dept: GASTROENTEROLOGY | Facility: CLINIC | Age: 67
End: 2022-07-28
Payer: COMMERCIAL

## 2022-07-28 VITALS — BODY MASS INDEX: 24.32 KG/M2 | WEIGHT: 145 LBS

## 2022-07-28 DIAGNOSIS — K86.2 PANCREATIC CYST: ICD-10-CM

## 2022-07-28 PROCEDURE — 99204 OFFICE O/P NEW MOD 45 MIN: CPT | Mod: GT | Performed by: INTERNAL MEDICINE

## 2022-07-28 ASSESSMENT — PAIN SCALES - GENERAL: PAINLEVEL: NO PAIN (0)

## 2022-07-28 NOTE — LETTER
7/28/2022         RE: Neli Gregorio  [address] [place] [address]        Dear Colleague,    Thank you for referring your patient, Neli Gregorio, to the [facility] [place]. Please see a copy of my visit note below.        [facility]      Referring provider  RAIZA Goodson  Query Pancreatic cyst    HPI  Ms Gregorio is a 68yo woman who was incidentally found to have two simple cystic lesions of the pancreas on imaging of the chest to follow up on a small pulmonary nodule. These were not noted on CT of the abdomen in 2015 and 2019, though by my eye these appear on the latter study as well, perhaps slightly smaller.    The cystic lesions had no concerning features such as main duct abnormality or involvement, nor was a solid lesion or component demonstrated. She has no abdominal complaints and her bowels are regular. She has no personal or family history of pancreatic disease. She is a former smoker and rarely drinks. She is not obese.    She had a normal colonoscopy in 2018 and would not be due for repeat screening until 2028.    Review of Systems   ROS COMP: Constitutional, HEENT, cardiovascular, pulmonary, GI, , musculoskeletal, neuro, skin, endocrine and psych systems are negative, except as otherwise noted.    Medications  Current Outpatient Medications   Medication     amLODIPine (NORVASC) 2.5 MG tablet     atorvastatin (LIPITOR) 40 MG tablet     BIOTIN PO     Cholecalciferol (VITAMIN D3 PO)     desonide (DESOWEN) 0.05 % external cream     EPINEPHrine (ANY BX GENERIC EQUIV) 0.3 MG/0.3ML injection 2-pack     predniSONE (DELTASONE) 20 MG tablet     zinc gluconate 50 MG tablet     No current facility-administered medications for this visit.     Past Medical  Past Medical History:   Diagnosis Date     Abdominal pain      Atrophic vaginitis      Decreased libido 2015    trial of testosterone cream. Not interested in IM.      Hypercholesteraemia 2010     Hyperlipidemia     PATIENT STARTED LOVASTATIN      Insomnia, unspecified     Takes 1/4 tab of Ambien 5mg nightly.      Proctitis      Psoriasis     Dx originally by derm, mild disease. TCM prn.     Past Surgical  Past Surgical History:   Procedure Laterality Date     COLONOSCOPY       COLONOSCOPY N/A 2018    Procedure: INTRAOPERATIVE COLONOSCOPY;  Surgeon: Mary Grigsby MD;  Location:  OR     EYE SURGERY  2004    lasik     HEMORRHOIDECTOMY INTERNAL N/A 2018    Procedure: HEMORRHOIDECTOMY INTERNAL;  Surgeon: Mary Grigsby MD;  Location:  OR     MAMMOPLASTY AUGMENTATION  2007    Breast lift     ORTHOPEDIC SURGERY  2018    left shoulder repair     Social History  Social History     Socioeconomic History     Marital status:      Spouse name: Samuel     Number of children: 1   Occupational History     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 No     Family History  Family History   Problem Relation Age of Onset     Heart Disease Father      Hypertension Father      Hyperlipidemia Father      Alzheimer Disease Mother      Hyperlipidemia Mother      Osteoporosis Other         aunt     Osteoporosis Maternal Grandmother      Objective:  Reported vitals:  There were no vitals taken for this visit.   GEN: Healthy, alert and no distress  PSYCH: Alert and oriented  times 3; coherent speech, normal rate and volume, able to articulate logical thoughts, able to abstract reason, no tangential thoughts, no hallucinations or delusions, affect seems normal  RESP: No cough, no audible wheezing, able to talk in full sentences  Remainder of exam unable to be completed due to virtual visit     Outside Imaging summaries:    Assessment and plan:  Ms Gregorio is a 68yo woman with incidental findings of two simple cystic lesions of the pancreas without concerning findings. We reviewed that these likely represent mucinous lesions given their multiplicity, her demographics (woman >60) and lack of pancreatic history. We discussed the premalignant nature of side branched intraductal papillary mucinous neoplasms, which are favored, and that these will require surveillance imaging by contrasted MRI/MRCP in 6m (rather than 12m as this will be a dedicated study using the preferred modality). We reviewed that we may ultimately evaluate these by endoscopic ultrasound, however this will be deferred until such time as the lesions grow in size to allow adequate sampling (>12mm) or if the cysts demonstrate any concerning findings. She has no modifiable risk factors as she does not drink or smoke and she is not obese.    Plan: contrasted MRI/MRCP in 6m, with stable findings we will repeat surveillance by MRI 12m after that    It was a pleasure to participate in the care of this patient; please contact us with any further questions.  A total of at least 60 minutes was spent in evaluation of this patient today, >50% of which was discussion and counseling regarding the above delineated issues.      Jose Huggins MD PhD FACG NIKI COOPER  Associate Professor of Medicine, Surgery and Pediatrics  Interventional and Therapeutic Endoscopy  GI Service Line Medical Director    [facility]  Division of Gastroenterology and Hepatology  [facility] [address] [place], [place]  [address]    New Consultations  [phone]  Procedure Scheduling [phone]  Clinical Nurse Coordinator [phone]  Clinical Fax   [phone]  Administrative   [phone]  Administrative Fax  [phone]

## 2022-07-28 NOTE — PROGRESS NOTES
"    [facility]    The patient has been notified of following:     \"This video visit will be conducted via a call between you and your physician/provider. We have found that certain health care needs can be provided without the need for an in-person physical exam.  This service lets us provide the care you need with a video conversation.  If a prescription is necessary we can send it directly to your pharmacy.  If lab work is needed we can place an order for that and you can then stop by our lab to have the test done at a later time.    Video visits are billed at different rates depending on your insurance coverage.  Please reach out to your insurance provider with any questions.    If during the course of the call the physician/provider feels a video visit is not appropriate, you will not be charged for this service.\"    Patient has given verbal consent for Video visit? Yes  How would you like to obtain your AVS? My Chart  If you are dropped from the video visit, the video invite should be resent to: Cell phone  Will anyone else be joining your video visit? No  {If patient encounters technical issues they should call [phone]     Video-Visit Details    Type of service:  Video Visit    Video Start Time: 0745  Video End Time: 0830    Originating Location (pt. Location): Home    Distant Location (provider location):  [facility] [place]     Platform used for Video Visit: "[employer]    Referring provider  X Golden Goodson  Query Pancreatic cyst    HPI  Ms Gregorio is a 66yo woman who was incidentally found to have two simple cystic lesions of the pancreas on imaging of the chest to follow up on a small pulmonary nodule. These were not noted on CT of the abdomen in 2015 and 2019, though by my eye these appear on the latter study as well, perhaps slightly smaller.    The cystic lesions had no concerning features such as main duct abnormality or " involvement, nor was a solid lesion or component demonstrated. She has no abdominal complaints and her bowels are regular. She has no personal or family history of pancreatic disease. She is a former smoker and rarely drinks. She is not obese.    She had a normal colonoscopy in 2018 and would not be due for repeat screening until 2028.    Review of Systems   ROS COMP: Constitutional, HEENT, cardiovascular, pulmonary, GI, , musculoskeletal, neuro, skin, endocrine and psych systems are negative, except as otherwise noted.    Medications  Current Outpatient Medications   Medication     amLODIPine (NORVASC) 2.5 MG tablet     atorvastatin (LIPITOR) 40 MG tablet     BIOTIN PO     Cholecalciferol (VITAMIN D3 PO)     desonide (DESOWEN) 0.05 % external cream     EPINEPHrine (ANY BX GENERIC EQUIV) 0.3 MG/0.3ML injection 2-pack     predniSONE (DELTASONE) 20 MG tablet     zinc gluconate 50 MG tablet     No current facility-administered medications for this visit.     Past Medical  Past Medical History:   Diagnosis Date     Abdominal pain      Atrophic vaginitis      Decreased libido 2015    trial of testosterone cream. Not interested in IM.     Hypercholesteraemia 2010     Hyperlipidemia     PATIENT STARTED LOVASTATIN 12/09     Insomnia, unspecified     Takes 1/4 tab of Ambien 5mg nightly.      Proctitis      Psoriasis     Dx originally by derm, mild disease. TCM prn.     Past Surgical  Past Surgical History:   Procedure Laterality Date     COLONOSCOPY       COLONOSCOPY N/A 12/21/2018    Procedure: INTRAOPERATIVE COLONOSCOPY;  Surgeon: Mary Grigsby MD;  Location:  OR     EYE SURGERY  2004    lasik     HEMORRHOIDECTOMY INTERNAL N/A 12/21/2018    Procedure: HEMORRHOIDECTOMY INTERNAL;  Surgeon: Mary Grigsby MD;  Location:  OR     MAMMOPLASTY AUGMENTATION  2007    Breast lift     ORTHOPEDIC SURGERY  2018    left shoulder repair     Social History  Social History     Socioeconomic History     Marital status:       Spouse name: Samuel     Number of children: 1   Occupational History     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 No     Family History  Family History   Problem Relation Age of Onset     Heart Disease Father      Hypertension Father      Hyperlipidemia Father      Alzheimer Disease Mother      Hyperlipidemia Mother      Osteoporosis Other         aunt     Osteoporosis Maternal Grandmother      Objective:  Reported vitals:  There were no vitals taken for this visit.   GEN: Healthy, alert and no distress  PSYCH: Alert and oriented times 3; coherent speech, normal rate and volume, able to articulate logical thoughts, able to abstract reason, no tangential thoughts, no hallucinations or delusions, affect seems normal  RESP: No cough, no audible wheezing, able to talk in full sentences  Remainder of exam unable to be completed due to virtual visit     Outside Imaging summaries:    Assessment and plan:  Ms Gregorio is a 66yo woman with incidental findings of two simple cystic lesions of the pancreas without concerning findings. We reviewed that these likely represent mucinous lesions given their multiplicity, her demographics (woman >60) and lack of pancreatic history. We discussed the premalignant nature of side branched intraductal papillary mucinous neoplasms, which are favored, and that these will require  surveillance imaging by contrasted MRI/MRCP in 6m (rather than 12m as this will be a dedicated study using the preferred modality). We reviewed that we may ultimately evaluate these by endoscopic ultrasound, however this will be deferred until such time as the lesions grow in size to allow adequate sampling (>12mm) or if the cysts demonstrate any concerning findings. She has no modifiable risk factors as she does not drink or smoke and she is not obese.    Plan: contrasted MRI/MRCP in 6m, with stable findings we will repeat surveillance by MRI 12m after that    It was a pleasure to participate in the care of this patient; please contact us with any further questions.  A total of at least 60 minutes was spent in evaluation of this patient today, >50% of which was discussion and counseling regarding the above delineated issues.      Jose Huggins MD PhD FACG NIKI COOPER  Associate Professor of Medicine, Surgery and Pediatrics  Interventional and Therapeutic Endoscopy  GI Service Line Medical Director    [facility]  Division of Gastroenterology and Hepatology  [facility] [address] [place], [place] [address]    New Consultations  [phone]  Procedure Scheduling [phone]  Clinical Nurse Coordinator [phone]  Clinical Fax   [phone]  Administrative   [phone]  Administrative Fax  [phone]

## 2022-08-01 NOTE — PATIENT INSTRUCTIONS
Follow up:    Dr. Pacheco has outlined the following steps after your recent clinic visit:    MRI/MRCP in 6 months.Please call [phone] to schedule the imaging.         Please call with any questions or concerns regarding your clinic visit today.    It is a pleasure being involved in your health care.    Contacts post-consultation depending on your need:    Schedule Clinic Appointments            [phone] # 1   M-F 7:30 - 5 pm    Corina Cantu RN Care Coordinator  [phone]    Nani Mares, RN Care Coordinator [phone]    Annalee Rogers, RN Care Coordinator [phone]     OR Procedure Scheduling                             [phone]    For urgent/emergent questions after business hours, you may reach the on-call GI Fellow by contacting the [facility]  at [phone].    How do I schedule labs, imaging studies, or procedures that were ordered in clinic today?     Labs: To schedule lab appointment at the [facility], use my chart or call [phone]. If you have a [place] lab closer to home where you are regularly seen you can give them a call.     Procedures: If a colonoscopy, upper endoscopy, breath test, esophageal manometry, or pH impedence was ordered today, our endoscopy team will call you to schedule this. If you have not heard from our endoscopy team within a week, please call [phone] to schedule.     Imaging Studies: If you were scheduled for a CT scan, X-ray, MRI, ultrasound, HIDA scan or other imaging study, please call [phone] to have this scheduled.     Referral: If a referral to another specialty was ordered, expect a phone call or follow instructions above. If you have not heard from anyone regarding your referral in a week, please call our clinic to check the status.     How to I schedule a follow-up visit?  If you did not schedule a follow-up visit today, please call [phone] to schedule a follow-up office visit.

## 2022-08-20 ENCOUNTER — HOSPITAL ENCOUNTER (EMERGENCY)
Facility: CLINIC | Age: 67
Discharge: HOME | End: 2022-08-20
Attending: EMERGENCY MEDICINE | Admitting: EMERGENCY MEDICINE
Payer: COMMERCIAL

## 2022-08-20 VITALS
WEIGHT: 145 LBS | TEMPERATURE: 98.2 F | RESPIRATION RATE: 18 BRPM | BODY MASS INDEX: 24.16 KG/M2 | HEIGHT: 65 IN | HEART RATE: 72 BPM | OXYGEN SATURATION: 98 % | SYSTOLIC BLOOD PRESSURE: 123 MMHG | DIASTOLIC BLOOD PRESSURE: 69 MMHG

## 2022-08-20 DIAGNOSIS — E87.1 HYPONATREMIA: ICD-10-CM

## 2022-08-20 DIAGNOSIS — R11.2 NON-INTRACTABLE VOMITING WITH NAUSEA, UNSPECIFIED VOMITING TYPE: ICD-10-CM

## 2022-08-20 LAB
ALBUMIN SERPL-MCNC: 4.2 G/DL (ref 3.4–5)
ALP SERPL-CCNC: 49 U/L (ref 40–150)
ALT SERPL W P-5'-P-CCNC: 20 U/L (ref 0–50)
ANION GAP SERPL CALCULATED.3IONS-SCNC: 8 MMOL/L (ref 3–14)
AST SERPL W P-5'-P-CCNC: 14 U/L (ref 0–45)
ATRIAL RATE - MUSE: 77 BPM
BASOPHILS # BLD AUTO: 0 10E3/UL (ref 0–0.2)
BASOPHILS NFR BLD AUTO: 0 %
BILIRUB SERPL-MCNC: 0.9 MG/DL (ref 0.2–1.3)
BUN SERPL-MCNC: 12 MG/DL (ref 7–30)
CALCIUM SERPL-MCNC: 8.9 MG/DL (ref 8.5–10.1)
CHLORIDE BLD-SCNC: 96 MMOL/L (ref 94–109)
CO2 SERPL-SCNC: 27 MMOL/L (ref 20–32)
CREAT SERPL-MCNC: 0.75 MG/DL (ref 0.52–1.04)
DIASTOLIC BLOOD PRESSURE - MUSE: NORMAL MMHG
EOSINOPHIL # BLD AUTO: 0 10E3/UL (ref 0–0.7)
EOSINOPHIL NFR BLD AUTO: 0 %
ERYTHROCYTE [DISTWIDTH] IN BLOOD BY AUTOMATED COUNT: 12.3 % (ref 10–15)
GFR SERPL CREATININE-BSD FRML MDRD: 88 ML/MIN/1.73M2
GLUCOSE BLD-MCNC: 119 MG/DL (ref 70–99)
HCT VFR BLD AUTO: 39.9 % (ref 35–47)
HGB BLD-MCNC: 13.6 G/DL (ref 11.7–15.7)
IMM GRANULOCYTES # BLD: 0 10E3/UL
IMM GRANULOCYTES NFR BLD: 0 %
INTERPRETATION ECG - MUSE: NORMAL
LIPASE SERPL-CCNC: 138 U/L (ref 73–393)
LYMPHOCYTES # BLD AUTO: 1.1 10E3/UL (ref 0.8–5.3)
LYMPHOCYTES NFR BLD AUTO: 15 %
MCH RBC QN AUTO: 31.3 PG (ref 26.5–33)
MCHC RBC AUTO-ENTMCNC: 34.1 G/DL (ref 31.5–36.5)
MCV RBC AUTO: 92 FL (ref 78–100)
MONOCYTES # BLD AUTO: 0.5 10E3/UL (ref 0–1.3)
MONOCYTES NFR BLD AUTO: 7 %
NEUTROPHILS # BLD AUTO: 6 10E3/UL (ref 1.6–8.3)
NEUTROPHILS NFR BLD AUTO: 78 %
NRBC # BLD AUTO: 0 10E3/UL
NRBC BLD AUTO-RTO: 0 /100
P AXIS - MUSE: 30 DEGREES
PLATELET # BLD AUTO: 198 10E3/UL (ref 150–450)
POTASSIUM BLD-SCNC: 3.7 MMOL/L (ref 3.4–5.3)
PR INTERVAL - MUSE: 136 MS
PROT SERPL-MCNC: 6.9 G/DL (ref 6.8–8.8)
QRS DURATION - MUSE: 86 MS
QT - MUSE: 390 MS
QTC - MUSE: 441 MS
R AXIS - MUSE: 86 DEGREES
RBC # BLD AUTO: 4.34 10E6/UL (ref 3.8–5.2)
SODIUM SERPL-SCNC: 131 MMOL/L (ref 133–144)
SYSTOLIC BLOOD PRESSURE - MUSE: NORMAL MMHG
T AXIS - MUSE: 66 DEGREES
TROPONIN I SERPL HS-MCNC: 6 NG/L
VENTRICULAR RATE- MUSE: 77 BPM
WBC # BLD AUTO: 7.7 10E3/UL (ref 4–11)

## 2022-08-20 PROCEDURE — 85025 COMPLETE CBC W/AUTO DIFF WBC: CPT | Performed by: EMERGENCY MEDICINE

## 2022-08-20 PROCEDURE — 93005 ELECTROCARDIOGRAM TRACING: CPT

## 2022-08-20 PROCEDURE — 258N000003 HC RX IP 258 OP 636: Performed by: EMERGENCY MEDICINE

## 2022-08-20 PROCEDURE — 96374 THER/PROPH/DIAG INJ IV PUSH: CPT

## 2022-08-20 PROCEDURE — 250N000011 HC RX IP 250 OP 636: Performed by: EMERGENCY MEDICINE

## 2022-08-20 PROCEDURE — 96361 HYDRATE IV INFUSION ADD-ON: CPT

## 2022-08-20 PROCEDURE — 36415 COLL VENOUS BLD VENIPUNCTURE: CPT | Performed by: EMERGENCY MEDICINE

## 2022-08-20 PROCEDURE — 82040 ASSAY OF SERUM ALBUMIN: CPT | Performed by: EMERGENCY MEDICINE

## 2022-08-20 PROCEDURE — 84484 ASSAY OF TROPONIN QUANT: CPT | Performed by: EMERGENCY MEDICINE

## 2022-08-20 PROCEDURE — 99284 EMERGENCY DEPT VISIT MOD MDM: CPT | Mod: 25

## 2022-08-20 PROCEDURE — 80053 COMPREHEN METABOLIC PANEL: CPT | Performed by: EMERGENCY MEDICINE

## 2022-08-20 PROCEDURE — 83690 ASSAY OF LIPASE: CPT | Performed by: EMERGENCY MEDICINE

## 2022-08-20 RX ORDER — ONDANSETRON 2 MG/ML
4 INJECTION INTRAMUSCULAR; INTRAVENOUS EVERY 30 MIN PRN
Status: DISCONTINUED | OUTPATIENT
Start: 2022-08-20 | End: 2022-08-20 | Stop reason: HOSPADM

## 2022-08-20 RX ORDER — SODIUM CHLORIDE 9 MG/ML
INJECTION, SOLUTION INTRAVENOUS CONTINUOUS
Status: DISCONTINUED | OUTPATIENT
Start: 2022-08-20 | End: 2022-08-20 | Stop reason: HOSPADM

## 2022-08-20 RX ORDER — PROCHLORPERAZINE MALEATE 5 MG
5 TABLET ORAL EVERY 8 HOURS PRN
Qty: 10 TABLET | Refills: 0 | Status: SHIPPED | OUTPATIENT
Start: 2022-08-20 | End: 2023-02-16

## 2022-08-20 RX ORDER — METOCLOPRAMIDE 5 MG/1
5 TABLET ORAL 3 TIMES DAILY PRN
Qty: 10 TABLET | Refills: 0 | Status: SHIPPED | OUTPATIENT
Start: 2022-08-20 | End: 2023-02-16

## 2022-08-20 RX ADMIN — SODIUM CHLORIDE 1000 ML: 9 INJECTION, SOLUTION INTRAVENOUS at 20:09

## 2022-08-20 RX ADMIN — ONDANSETRON 4 MG: 2 INJECTION INTRAMUSCULAR; INTRAVENOUS at 20:10

## 2022-08-20 ASSESSMENT — ENCOUNTER SYMPTOMS
ABDOMINAL PAIN: 0
NAUSEA: 1
DIARRHEA: 0
LIGHT-HEADEDNESS: 1
HEADACHES: 1
SHORTNESS OF BREATH: 0
DIZZINESS: 0
VOMITING: 1

## 2022-08-20 ASSESSMENT — ACTIVITIES OF DAILY LIVING (ADL): ADLS_ACUITY_SCORE: 35

## 2022-08-21 ENCOUNTER — NURSE TRIAGE (OUTPATIENT)
Dept: NURSING | Facility: CLINIC | Age: 67
End: 2022-08-21

## 2022-08-21 NOTE — TELEPHONE ENCOUNTER
Patient hasnt been feeling well for a few days.  Started on Tuesday.  Patient was feeling tired, nauseous and had a dry cough.    Seen in  then went to ED due to feeling horrible.    Patient had some blood work done, IV fluids and some antinausea medication.    Patient states she slept well last night and is feeling much better today.  The reason she is calling is she has some swollen lymph glands in her throat and was concerned.  She states that she didn't feel they were that swollen yesterday.  She is wondering why they are swollen.    Patient denies any breathing or swallowing issues.  Patient has no fever or no tenderness when touching them.    Explained to patient we see these become swollen with result of an infection.  Discussed it is a normal response from your body that is fighting infection.    Patient would like to have her PCP be aware and ask if there is any follow up recommended?    Will route a message to PCP.    Beverly Lisa RN   08/21/22 5:13 PM  [facility] Nurse Advisor    Reason for Disposition    [1] Mildly swollen lymph node AND [2] has cold symptoms (e.g., runny nose, cough, sore throat)    Additional Information    Negative: SEVERE difficulty breathing (e.g., struggling for each breath, speaks in single words)    Negative: Known hernia is main concern (i.e., soft lump or swelling in the groin that goes away when you push on it)    Negative: Swelling of scrotum is main symptom    Negative: Swelling of face is main symptom    Negative: [1] Swollen node is in the neck AND [2] < 1 inch (2.5 cm) in size AND [3] sore throat is main symptom    Negative: [1] Node is in the neck AND [2] causes difficulty breathing    Negative: [1] Node is in the neck AND [2] can't swallow fluids    Negative: Fever > 103 F (39.4 C)    Negative: [1] Lump or swelling in groin AND [2] pulsating (like heartbeat)    Negative: Patient sounds very sick or weak to the triager    Negative: [1] Single large node AND  [2] size > 1 inch (2.5 cm) AND [3] fever    Negative: [1] Overlying skin is red AND [2] fever    Negative: [1] Single large node AND [2] size > 1 inch (2.5 cm) AND [3] no fever    Negative: Rapid increase in size of node over several hours    Negative: [1] Overlying skin is red AND [2] no fever    Negative: [1] Tender node in the groin AND [2] has a sore, scratch, cut or painful red area on that leg    Negative: [1] Tender node in the armpit AND [2] has a sore, scratch, cut or painful red area on that arm    Negative: [1] Tender node in the neck AND [2] also has a sore throat AND [3] minimal/no runny nose or cough    Negative: Fever present > 3 days (72 hours)    Negative: Large nodes at multiple locations    Negative: [1] Very tender to the touch AND [2] no fever    Negative: [1] Large node AND [2] present > 2 weeks    Negative: [1] Normal-sized node (i.e., < 1 cm, < 1/2 in) AND [2] present > 2 weeks AND [3] patient is worried about cancer    Protocols used: LYMPH NODES - [id number]

## 2022-08-21 NOTE — ED TRIAGE NOTES
Patient here with nausea ,vomiting and diarrhea which started 2 days ago.she denies having abdominal pain. She also having dizziness

## 2022-08-21 NOTE — ED PROVIDER NOTES
History   Chief Complaint:  Nausea & Vomiting     HPI   Neli Gregorio is a 67 year old female with history of hyperlipidemia who presents with nausea and vomiting. Three days ago, the patient began experiencing multiple episodes of non-bloody emesis. Since then she has had trouble keeping food down. She has been able to keep small amounts of fluids down but has trouble drinking large volumes. She has developed an accompanying dry cough, lightheadedness and a mild headache. Yesterday, her symptoms became much more severe, and she was unable to go into work due to this. Today, she presented to  and had a negative UI and Covid-19 test. She was prescribed antiemetics and has taken two doses without any relief. Due to her continued symptoms she presented here. She notes, her eye feel as though they are not aligned but denies any blurry vision. She denies any abdominal pain, chest pain, shortness of breath or dizziness. Of note, she occasionally drinks alcohol but does not use tobacco or illegal substances.     Review of Systems   Eyes: Negative for visual disturbance.   Respiratory: Negative for shortness of breath.    Cardiovascular: Negative for chest pain.   Gastrointestinal: Positive for nausea and vomiting (non-bloody). Negative for abdominal pain and diarrhea.   Neurological: Positive for light-headedness and headaches. Negative for dizziness.   All other systems reviewed and are negative.    Allergies:  Bee Venom  Cats    Medications:  Norvasc  Lipitor  Biotin  Vitamin D3  Epinephrine    Past Medical History:    Atrophic vaginitis  Decreased libido  Hyperlipidemia  Insomnia  Proctitis  Psoriasis   Impingement syndrome of shoulder region, right     Past Surgical History:    Colonoscopy  Lasik  Hemorrhoidectomy internal  Mammoplasty augmentation  Left shoulder repair    Family History:    Father: Heart disease, Hypertension, Hyperlipidemia  Mother: Alzheimer disease, Hyperlipidemia     Social History:  The  "patient was accompanied to the ED by her .  The patient occassionally drinks alcohol but does not use tobacco or illegal substances.     Physical Exam     Patient Vitals for the past 24 hrs:   BP Temp Temp src Pulse Resp SpO2 Height Weight   08/20/22 1935 123/69 98.2  F (36.8  C) Oral 78 18 98 % 1.651 m (5' 5\") 65.8 kg (145 lb)       Physical Exam  Constitutional: Well developed, nontox appearance  Head: Atraumatic.   Mouth/Throat: Oropharynx is clear and moist.   Neck:  no stridor  Eyes: no scleral icterus, PERRL, EOMI, visual fields intact  Cardiovascular: RRR, 2+ bilat radial pulses  Pulmonary/Chest: nml resp effort  Abdominal: ND, soft, NT, no rebound or guarding   Ext: Warm, well perfused, no edema  Neurological: A&O,  CNII-XII intact, nml finger to nose, 5/5 strength throughout upper and lower ext, symmetric; sensation grossly intact  Skin: Skin is warm and dry.   Psychiatric: Behavior is normal. Thought content normal.   Nursing note and vitals reviewed.    Emergency Department Course   ECG:  ECG taken at 2013, ECG read at 2018  Normal sinus rhythm  Normal ECG  No significant change compared to EKG dated 09/10/2018  Rate 77 bpm. KS interval 136 ms. QRS duration 86 ms. QT/QTc 390/441 ms. P-R-T axes 30 86 66.    Imaging:  No orders to display       Laboratory:  Labs Ordered and Resulted from Time of ED Arrival to Time of ED Departure   COMPREHENSIVE METABOLIC PANEL - Abnormal       Result Value    Sodium 131 (*)     Potassium 3.7      Chloride 96      Carbon Dioxide (CO2) 27      Anion Gap 8      Urea Nitrogen 12      Creatinine 0.75      Calcium 8.9      Glucose 119 (*)     Alkaline Phosphatase 49      AST 14      ALT 20      Protein Total 6.9      Albumin 4.2      Bilirubin Total 0.9      GFR Estimate 88     LIPASE - Normal    Lipase 138     TROPONIN I - Normal    Troponin I High Sensitivity 6     CBC WITH PLATELETS AND DIFFERENTIAL    WBC Count 7.7      RBC Count 4.34      Hemoglobin 13.6      " Hematocrit 39.9      MCV 92      MCH 31.3      MCHC 34.1      RDW 12.3      Platelet Count 198      % Neutrophils 78      % Lymphocytes 15      % Monocytes 7      % Eosinophils 0      % Basophils 0      % Immature Granulocytes 0      NRBCs per 100 WBC 0      Absolute Neutrophils 6.0      Absolute Lymphocytes 1.1      Absolute Monocytes 0.5      Absolute Eosinophils 0.0      Absolute Basophils 0.0      Absolute Immature Granulocytes 0.0      Absolute NRBCs 0.0        Emergency Department Course:     Reviewed:  I reviewed nursing notes, vitals, past medical history and care everywhere    Assessments:   I obtained history and examined the patient as noted above.      I rechecked and updated the patient regarding the imaging results, laboratory results and the plan for care.    Interventions:   NS 1L IV Bolus   Zofran 4mg IV    Disposition:  The patient was discharged to home.     Impression & Plan   Medical Decision Makin year old female presenting w/ nausea, vomiting     DDx includes gastritis, hepatitis, pancreatitis, food poisoning, dehydration, electrolyte abnormality, atypical ACS.  EKG interpretation as noted above.   Labs significant for mild hyponatremia otherwise unremarkable.  Patient's abdominal exam is benign therefore imaging was deferred.  Interventions as noted above with improvement in symptoms.  Doubt atypical ACS, CVA, intracranial abnormality causing persistent nausea and vomiting.  Presentation seems most consistent with viral gastritis prolonged food poisoning.  Prescriptions written as noted below for outpatient management.  At this time I feel the pt is safe for discharge.  Recommendations given regarding follow up with PCP and return to the emergency department as needed for new or worsening symptoms.  Patient counseled on all results, disposition and diagnosis.  They are understanding and agreeable to plan. Patient discharged in stable condition.    Diagnosis:    ICD-10-CM     1. Non-intractable vomiting with nausea, unspecified vomiting type  R11.2    2. Hyponatremia  E87.1        Discharge Medications:  New Prescriptions    METOCLOPRAMIDE (REGLAN) 5 MG TABLET    Take 1 tablet (5 mg) by mouth 3 times daily as needed (Nausea or Vomiting)    PROCHLORPERAZINE (COMPAZINE) 5 MG TABLET    Take 1 tablet (5 mg) by mouth every 8 hours as needed for nausea       Scribe Disclosure:  I, Akshat Johnson, am serving as a scribe at 7:55 PM on 8/20/2022 to document services personally performed by Marco Jaeger MD based on my observations and the provider's statements to me.      Marco Jaeger MD  08/20/22 [id number]

## 2022-08-21 NOTE — DISCHARGE INSTRUCTIONS
Discharge Instructions  Vomiting    You have been seen today for vomiting (throwing up). This is usually caused by a virus, but some bacteria, parasites, medicines or other medical conditions can cause similar symptoms. At this time your provider does not find that your vomiting is a sign of anything dangerous or life-threatening. However, sometimes the signs of serious illness do not show up right away. If you have new or worse symptoms, you may need to be seen again in the Emergency Department or by your primary provider. Remember that serious problems like appendicitis can start as vomiting.    Generally, every Emergency Department visit should have a follow-up clinic visit with either a primary or a specialty clinic/provider. Please follow-up as instructed by your emergency provider today.    Return to the Emergency Department if:  You keep vomiting and you are not able to keep liquids down.   You feel you are getting dehydrated, such as being very thirsty, not urinating (peeing) at least every 8-12 hours, or feeling faint or lightheaded.   You develop a new fever, or your fever continues for more than 2 days.   You have abdominal (belly pain) that seems worse than cramps, is in one spot, or is getting worse over time. Appendicitis usually causes pain in the right lower abdomen (to the right and below your belly button) so watch for pain in this location.  You have blood in your vomit or stools.   You feel very weak.  You are not starting to improve within 24 hours of your visit here.     What can I do to help myself?  The most important thing to do is to drink clear liquids. If you have been vomiting a lot, it is best to have only small, frequent sips of liquids. Drinking too much at once may cause more vomiting. If you are vomiting often, you must replace minerals, sodium and potassium lost with your illness. Pedialyte  is the best available rehydration liquid but some find that it doesn t taste good so  sports drinks are an alterative. You can also drink clear liquids such as water, weak tea, apple juice, and 7-Up . Avoid acid liquids (orange), caffeine (coffee) or alcohol. Do not drink milk until you no longer have diarrhea (loose stools).   After liquids are staying down, you may start eating mild foods. Soda crackers, toast, plain noodles, gelatin, applesauce and bananas are good first choices. Avoid foods that have acid, are spicy, fatty or have a lot of fiber (such as meats, coarse grains, vegetables). You may start eating these foods again in about 3 days when you are better.   Sometimes treatment includes prescription medicine to prevent nausea (sick to your stomach) and vomiting. If your provider prescribes these for you, take them as directed.   Do not take ibuprofen, naproxen, or other nonsteroidal anti-inflammatory (NSAID) medicines without checking with your healthcare provider.     If you were given a prescription for medicine here today, be sure to read all of the information (including the package insert) that comes with your prescription.  This will include important information about the medicine, its side effects, and any warnings that you need to know about.  The pharmacist who fills the prescription can provide more information and answer questions you may have about the medicine.  If you have questions or concerns that the pharmacist cannot address, please call or return to the Emergency Department.     Remember that you can always come back to the Emergency Department if you are not able to see your regular provider in the amount of time listed above, if you get any new symptoms, or if there is anything that worries you.

## 2022-08-22 ENCOUNTER — VIRTUAL VISIT (OUTPATIENT)
Dept: FAMILY MEDICINE | Facility: CLINIC | Age: 67
End: 2022-08-22
Payer: COMMERCIAL

## 2022-08-22 ENCOUNTER — MYC MEDICAL ADVICE (OUTPATIENT)
Dept: FAMILY MEDICINE | Facility: CLINIC | Age: 67
End: 2022-08-22

## 2022-08-22 DIAGNOSIS — E78.5 HYPERLIPIDEMIA LDL GOAL <100: ICD-10-CM

## 2022-08-22 DIAGNOSIS — I10 ESSENTIAL HYPERTENSION: ICD-10-CM

## 2022-08-22 DIAGNOSIS — R05.9 COUGH: ICD-10-CM

## 2022-08-22 DIAGNOSIS — R09.81 NASAL CONGESTION: ICD-10-CM

## 2022-08-22 DIAGNOSIS — J02.9 SORE THROAT: ICD-10-CM

## 2022-08-22 DIAGNOSIS — R53.81 MALAISE: ICD-10-CM

## 2022-08-22 DIAGNOSIS — U07.1 INFECTION DUE TO 2019 NOVEL CORONAVIRUS: Primary | ICD-10-CM

## 2022-08-22 PROCEDURE — 99214 OFFICE O/P EST MOD 30 MIN: CPT | Mod: CS | Performed by: INTERNAL MEDICINE

## 2022-08-22 RX ORDER — GUAIFENESIN/DEXTROMETHORPHAN 100-10MG/5
10 SYRUP ORAL EVERY 4 HOURS PRN
Qty: 354 ML | Refills: 3 | Status: SHIPPED | OUTPATIENT
Start: 2022-08-22 | End: 2023-02-16

## 2022-08-22 RX ORDER — PREDNISONE 20 MG/1
TABLET ORAL
Qty: 20 TABLET | Refills: 0 | Status: SHIPPED | OUTPATIENT
Start: 2022-08-22 | End: 2023-02-16

## 2022-08-22 NOTE — PROGRESS NOTES
Neli is a 67 year old who is being evaluated via a billable telephone visit.      What phone number would you like to be contacted at? [phone]  How would you like to obtain your AVS? Birdie Quinteros is a 67 year old presenting for the following health issues:      HPI         Neli is a 67 year old who is being evaluated via a billable telephone visit.      What phone number would you like to be contacted at? [phone]  How would you like to obtain your AVS? Birdie Quinteros is a 67 year old who presents for the following health issues  accompanied by her spouse.    HPI       Chief Complaint   Patient presents with     URI         Chief Complaint:   multiple concerns including hyperlipidemia, Blood pressure concern  Cough, COVID concerns        HPI:   Patient Neli Gregorio is a very pleasant 67 year old female with history of allergies, hypertension, hyperlipidemia today for telephone visit for follow up of multiple concerns including recent cough, COVID symptoms, hyperlipidemia, chronic hypertension. No fever or chills at this time. Patient also reports well controlled chronic insomnia symptoms at this time without requiring the trazodone medication. No chest pain, headaches, fever or chills at this time.       Current Medications:     Current Outpatient Medications   Medication Sig Dispense Refill     amLODIPine (NORVASC) 2.5 MG tablet Take 1 tablet (2.5 mg) by mouth daily 90 tablet 1     atorvastatin (LIPITOR) 40 MG tablet Take 1 tablet (40 mg) by mouth daily 90 tablet 3     BIOTIN PO Take 5,000 mcg by mouth daily       Cholecalciferol (VITAMIN D3 PO) Take 4,000 Units by mouth daily        desonide (DESOWEN) 0.05 % external cream Apply sparingly to perineal area two times daily for 14 days. 15 g 3     EPINEPHrine (ANY BX GENERIC EQUIV) 0.3 MG/0.3ML injection 2-pack Inject 0.3 mLs (0.3 mg) into the muscle once as needed for anaphylaxis 2 each 3      guaiFENesin-dextromethorphan (ROBITUSSIN DM) 100-10 MG/5ML syrup Take 10 mLs by mouth every 4 hours as needed for cough 354 mL 3     metoclopramide (REGLAN) 5 MG tablet Take 1 tablet (5 mg) by mouth 3 times daily as needed (Nausea or Vomiting) 10 tablet 0     molnupiravir (LAGEVRIO) 200 MG capsule Take 4 capsules (800 mg) by mouth every 12 hours 40 each 0     predniSONE (DELTASONE) 20 MG tablet Take 3 tabs by mouth daily x 3 days, then 2 tabs daily x 3 days, then 1 tab daily x 3 days, then 1/2 tab daily x 3 days. 20 tablet 0     prochlorperazine (COMPAZINE) 5 MG tablet Take 1 tablet (5 mg) by mouth every 8 hours as needed for nausea 10 tablet 0     zinc gluconate 50 MG tablet Take 50 mg by mouth daily           Allergies:      Allergies   Allergen Reactions     Bee Venom Anaphylaxis     Cats Other (See Comments)     scratchy throat              Past Medical History:     Past Medical History:   Diagnosis Date     Abdominal pain      Atrophic vaginitis      Decreased libido 2015    trial of testosterone cream. Not interested in IM.     Hypercholesteraemia 2010     Hyperlipidemia     PATIENT STARTED LOVASTATIN 12/09     Insomnia, unspecified     Takes 1/4 tab of Ambien 5mg nightly.      Proctitis      Psoriasis     Dx originally by derm, mild disease. TCM prn.         Past Surgical History:     Past Surgical History:   Procedure Laterality Date     COLONOSCOPY       COLONOSCOPY N/A 12/21/2018    Procedure: INTRAOPERATIVE COLONOSCOPY;  Surgeon: Mary Grigsby MD;  Location:  OR     EYE SURGERY  2004    lasik     HEMORRHOIDECTOMY INTERNAL N/A 12/21/2018    Procedure: HEMORRHOIDECTOMY INTERNAL;  Surgeon: Mary Grigsby MD;  Location:  OR     MAMMOPLASTY AUGMENTATION  2007    Breast lift     ORTHOPEDIC SURGERY  2018    left shoulder repair         Family Medical History:     Family History   Problem Relation Age of Onset     Heart Disease Father      Hypertension Father      Hyperlipidemia Father       Alzheimer Disease Mother      Hyperlipidemia Mother      Osteoporosis Other         aunt     Osteoporosis Maternal Grandmother          Social History:     Social History     Socioeconomic History     Marital status:      Spouse name: Samuel     Number of children: 1     Years of education: Not on file     Highest education level: Not on file   Occupational History     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 No   Social History Narrative     Not on file     Social Determinants of Health     Financial Resource Strain: Not on file   Food Insecurity: Not on file   Transportation Needs: Not on file   Physical Activity: Not on file   Stress: Not on file   Social Connections: Not on file   Intimate Partner Violence: Not on file   Housing Stability: Not on file           Review of System:     Constitutional: Negative for fever or chills  Skin: Negative for rashes  Ears/Nose/Throat: positive for nasal congestion, sore throat  Respiratory: positive for cough  Cardiovascular: Negative for chest pain  Gastrointestinal: Negative for nausea, vomiting  Genitourinary: Negative for dysuria, hematuria  Musculoskeletal: negative   Neurologic: Negative for headaches  Psychiatric: positive for insomnia  Hematologic/Lymphatic/Immunologic: Negative  Endocrine: Negative  Behavioral: Negative for tobacco use       Physical Exam:   There  were no vitals taken for this visit.      RESP: cough present  NEURO: Alert & Oriented x 3.   PSYCH: mentation appears normal, affect normal        Diagnostic Test Results:     Diagnostic Test Results:  Labs reviewed in Epic      ASSESSMENT/PLAN:   Neli was seen today for nausea and throat problem.    Diagnoses and all orders for this visit:    Infection due to 2019 novel coronavirus  -     molnupiravir (LAGEVRIO) 200 MG capsule; Take 4 capsules (800 mg) by mouth every 12 hours  -     predniSONE (DELTASONE) 20 MG tablet; Take 3 tabs by mouth daily x 3 days, then 2 tabs daily x 3 days, then 1 tab daily x 3 days, then 1/2 tab daily x 3 days.  -     guaiFENesin-dextromethorphan (ROBITUSSIN DM) 100-10 MG/5ML syrup; Take 10 mLs by mouth every 4 hours as needed for cough    Cough  -     molnupiravir (LAGEVRIO) 200 MG capsule; Take 4 capsules (800 mg) by mouth every 12 hours  -     predniSONE (DELTASONE) 20 MG tablet; Take 3 tabs by mouth daily x 3 days, then 2 tabs daily x 3 days, then 1 tab daily x 3 days, then 1/2 tab daily x 3 days.  -     guaiFENesin-dextromethorphan (ROBITUSSIN DM) 100-10 MG/5ML syrup; Take 10 mLs by mouth every 4 hours as needed for cough    Malaise    Sore throat    Nasal congestion      Primary hypertension  - continue amLODIPine (NORVASC) 2.5 MG tablet      Hyperlipidemia with target LDL less than 100  - continue current therapy      Follow Up Plan:     Patient is instructed to return to Internal Medicine clinic for follow-up visit in 1 month.        Berna Goodson MD  Internal Medicine  [facility]        telephone visit duration: 30 minutes

## 2022-08-24 NOTE — TELEPHONE ENCOUNTER
Please see mychart from patient and advise appropriate course of action.      Lawson Bañuelos RN  [facility] Triage Nurse

## 2022-10-22 ENCOUNTER — HEALTH MAINTENANCE LETTER (OUTPATIENT)
Age: 67
End: 2022-10-22

## 2022-12-15 ENCOUNTER — ANCILLARY PROCEDURE (OUTPATIENT)
Dept: MRI IMAGING | Facility: CLINIC | Age: 67
End: 2022-12-15
Attending: INTERNAL MEDICINE
Payer: COMMERCIAL

## 2022-12-15 ENCOUNTER — MYC MEDICAL ADVICE (OUTPATIENT)
Dept: GASTROENTEROLOGY | Facility: CLINIC | Age: 67
End: 2022-12-15

## 2022-12-15 DIAGNOSIS — K86.2 PANCREATIC CYST: ICD-10-CM

## 2022-12-15 PROCEDURE — A9585 GADOBUTROL INJECTION: HCPCS | Performed by: INTERNAL MEDICINE

## 2022-12-15 PROCEDURE — 255N000002 HC RX 255 OP 636: Performed by: INTERNAL MEDICINE

## 2022-12-15 PROCEDURE — 74183 MRI ABD W/O CNTR FLWD CNTR: CPT

## 2022-12-15 RX ORDER — GADOBUTROL 604.72 MG/ML
6.5 INJECTION INTRAVENOUS ONCE
Status: COMPLETED | OUTPATIENT
Start: 2022-12-15 | End: 2022-12-15

## 2022-12-15 RX ADMIN — GADOBUTROL 6.5 ML: 604.72 INJECTION INTRAVENOUS at 08:06

## 2022-12-15 NOTE — TELEPHONE ENCOUNTER
Returned call to patient regarding Mychart about Dr. Huggins letter  We are writing to inform you of your test results. In short, the findings are reassuring. The cystic lesions remain without concerning feature, though one appears to have enlarged slightly. I would recommend we evaluate the cystic lesion by endoscopic ultrasound and sample the largest to ensure these are mucinous as suspected. My team will reach out.      Called patient to discuss recommended procedure- pt leaves for [place] on 1/11/23, spouse is in [place] until April- she wants her spouse there for procedure. Agreed I'd message Dr. Huggins to confirm that EUS in April would be appropriate.    ML

## 2023-02-15 ENCOUNTER — NURSE TRIAGE (OUTPATIENT)
Dept: FAMILY MEDICINE | Facility: CLINIC | Age: 68
End: 2023-02-15
Payer: COMMERCIAL

## 2023-02-15 PROBLEM — Z12.11 ENCOUNTER FOR SCREENING FOR MALIGNANT NEOPLASM OF COLON: Status: ACTIVE | Noted: 2023-02-15

## 2023-02-15 PROBLEM — K64.9 HEMORRHOIDS, UNSPECIFIED HEMORRHOID TYPE: Status: ACTIVE | Noted: 2018-10-24

## 2023-02-15 NOTE — PROGRESS NOTES
Subjective   Neli is a 67 year old presenting for the following health issues:     Chief Complaint:     Follow up on multiple concerns including known pancreatic cyst    HPI:   Patient Neli Gregorio is a very pleasant 67 year old female with history of chronic IBS, known pancreatic cysts who presents to Internal Medicine clinic today for follow up on multiple concerns including known pancreatic cyst.     She eats 2-3 servings of fruits and vegetables daily.She consumes 0 sweetened beverage(s) daily.She exercises with enough effort to increase her heart rate 9 or less minutes per day.  She exercises with enough effort to increase her heart rate 3 or less days per week.   She is taking medications regularly.    Current Medications:     Current Outpatient Medications   Medication Sig Dispense Refill     amLODIPine (NORVASC) 2.5 MG tablet Take 1 tablet (2.5 mg) by mouth daily 90 tablet 1     atorvastatin (LIPITOR) 40 MG tablet Take 1 tablet (40 mg) by mouth daily 90 tablet 3     BIOTIN PO Take 5,000 mcg by mouth daily       Cholecalciferol (VITAMIN D3 PO) Take 4,000 Units by mouth daily        desonide (DESOWEN) 0.05 % external cream Apply sparingly to perineal area two times daily for 14 days. 15 g 3     EPINEPHrine (ANY BX GENERIC EQUIV) 0.3 MG/0.3ML injection 2-pack Inject 0.3 mLs (0.3 mg) into the muscle once as needed for anaphylaxis 2 each 3     zinc gluconate 50 MG tablet Take 50 mg by mouth daily           Allergies:      Allergies   Allergen Reactions     Bee Venom Anaphylaxis     Cats Other (See Comments)     scratchy throat              Past Medical History:     Past Medical History:   Diagnosis Date     Abdominal pain      Atrophic vaginitis      Decreased libido 2015    trial of testosterone cream. Not interested in IM.     Hypercholesteraemia 2010     Hyperlipidemia     PATIENT STARTED LOVASTATIN 12/09     Insomnia, unspecified     Takes 1/4 tab of Ambien 5mg nightly.      Proctitis      Psoriasis      Dx originally by derm, mild disease. TCM prn.         Past Surgical History:     Past Surgical History:   Procedure Laterality Date     COLONOSCOPY       COLONOSCOPY N/A 2018    Procedure: INTRAOPERATIVE COLONOSCOPY;  Surgeon: Mary Grigsby MD;  Location:  OR     EYE SURGERY  2004    lasik     HEMORRHOIDECTOMY INTERNAL N/A 2018    Procedure: HEMORRHOIDECTOMY INTERNAL;  Surgeon: Mary Grigsby MD;  Location:  OR     MAMMOPLASTY AUGMENTATION      Breast lift     ORTHOPEDIC SURGERY  2018    left shoulder repair         Family Medical History:     Family History   Problem Relation Age of Onset     Heart Disease Father      Hypertension Father      Hyperlipidemia Father      Alzheimer Disease Mother      Hyperlipidemia Mother      Osteoporosis Other         aunt     Osteoporosis Maternal Grandmother          Social History:     Social History     Socioeconomic History     Marital status:      Spouse name: Samuel     Number of children: 1     Years of education: Not on file     Highest education level: Not on file   Occupational History     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 No   Social History Narrative     Not on file     Social Determinants of Health     Financial Resource Strain: Not on file   Food Insecurity: Not on file   Transportation Needs: Not on file   Physical Activity: Not on file  "  Stress: Not on file   Social Connections: Not on file   Intimate Partner Violence: Not on file   Housing Stability: Not on file           Review of System:     Constitutional: Negative for fever or chills  Skin: Negative for rashes  Ears/Nose/Throat: Negative for nasal congestion, sore throat  Respiratory: No shortness of breath, dyspnea on exertion, cough, or hemoptysis  Cardiovascular: Negative for chest pain  Gastrointestinal: Negative for nausea, vomiting, positive for chronic IBS, known pancreatic cysts  Genitourinary: Negative for dysuria, hematuria  Musculoskeletal: Negative for myalgias  Neurologic: Negative for headaches  Psychiatric: Negative for depression, anxiety  Hematologic/Lymphatic/Immunologic: Negative  Endocrine: Negative  Behavioral: Negative for tobacco use       Physical Exam:   /82   Pulse 63   Temp 97.3  F (36.3  C) (Oral)   Resp 14   Ht 1.67 m (5' 5.75\")   Wt 65.3 kg (144 lb)   LMP  (LMP Unknown)   SpO2 100%   Breastfeeding No   BMI 23.42 kg/m      GENERAL: alert and no distress  EYES: eyes grossly normal to inspection, and conjunctivae and sclerae normal  HENT: Normocephalic atraumatic. Nose and mouth without ulcers or lesions  NECK: supple  RESP: lungs clear to auscultation   CV: regular rate and rhythm, normal S1 S2  LYMPH: no peripheral edema   ABDOMEN: soft, nontender, nondistended   MS: no gross musculoskeletal defects noted  SKIN: no suspicious lesions or rashes  NEURO: Alert & Oriented x 3.   PSYCH: mentation appears normal, affect normal        Diagnostic Test Results:     Diagnostic Test Results:  Labs reviewed in [facility]    EXAM: MR ABDOMEN MRCP W/O and W CONTRAST  LOCATION: [facility]  DATE/TIME: 12/15/2022 9:22 AM     INDICATION: Pancreatic cyst.  COMPARISON: Abdomen and pelvis CT from 09/11/2019.  TECHNIQUE: Routine MR liver/pancreas protocol including axial and coronal MRCP sequences. 2D and 3D reconstruction performed by MR technologist " including MIP reconstruction and slab cholangiograms. If performed with contrast, additional dynamic T1 post   IV contrast images.  CONTRAST: 6.5mL Gadavist.     FINDINGS:      MRCP: 1.5 x 0.9 cm cyst in the body/tail of the pancreas. A few subcentimeter additional cystic lesions noted. No pancreatic ductal dilatation. No intra or extrahepatic biliary dilatation. No strictures. No cholelithiasis or choledocholithiasis.     LIVER: Subcentimeter simple cysts. No enhancing lesions. Normal in size and contour. No hepatic or portal venous thrombus demonstrated. No hepatic steatosis.     PANCREAS: No focal lesions or inflammatory change. Unremarkable signal intensity.     ADDITIONAL FINDINGS: Tiny simple cyst in the left kidney. Right kidney unremarkable. Spleen and adrenal glands unremarkable.                                                                      IMPRESSION:  1.  1.5 x 0.9 cm cyst in the body/tail of the pancreas, additional subcentimeter cysts noted in the pancreas. No aggressive features demonstrated in the cystic lesions.     Size of largest cyst: 1-2 cm: CT or MRI with contrast yearly for 2 years, then lengthen interval if no change. Consider Gastroenterology consultation for all categories of pancreatic cysts.     REFERENCE: International Consensus Guidelines for Management of IPMN and MCN of the Pancreas. Pancreatology 12 (2012) 183-197. Asymptomatic patient without high-risk stigmata of malignancy (obstructive jaundice with cystic lesion in head of pancreas,   enhancing solid component within cyst, or main pancreatic duct greater than 10 mm), and without worrisome features (cyst greater than 3 cm, thickened/enhancing cyst walls, main pancreatic duct 5-9 mm, mural nodule, or abrupt change in caliber of   pancreatic duct with distal pancreatic atrophy).      ASSESSMENT/PLAN:       Neli was seen today for pain and recheck medication.    Diagnoses and all orders for this visit:  Irritable bowel  syndrome with both constipation and diarrhea  Pancreatic cyst  - known pancreatic cysts on recent MRI scan, per MRI report from 12/2022, No aggressive features demonstrated in the cystic lesions.  - continue outpatinet GI specialist clinic follow up with the [facility] going forward.     Hypercholesterolemia  - continue Lipitor    Essential hypertension  - BP is at goal, continue amlodipine      Follow Up Plan:     Patient is instructed to return to Internal Medicine clinic for follow-up visit in 1 to 3 months.        Berna Goodson MD  Internal Medicine  [facility]

## 2023-02-15 NOTE — TELEPHONE ENCOUNTER
"Has cyst on pancrease and is worried that the mild intermittent pain she has been having throughout her abdomen and lower back the past few days could be related. Pt dispo'd to office today due to age; no openings with PCP until tomorrow; pt requesting to wait until then.     Pt scheduled, routing to pcp for review/approval.     Pt does not need a call back from clinic unless further questions or an appointment change is needed.     1. LOCATION: \"Where does it hurt?\"       Mostly on the left side but abdomen and lower back pain, superficial, nerve like.   2. RADIATION: \"Does the pain shoot anywhere else?\" (e.g., chest, back)      Starts in back and radiates to abdomen on left side  3. ONSET: \"When did the pain begin?\" (e.g., minutes, hours or days ago)       Few weeks; 2-3  4. SUDDEN: \"Gradual or sudden onset?\"      gradual  5. PATTERN \"Does the pain come and go, or is it constant?\"     - If constant: \"Is it getting better, staying the same, or worsening?\"       (Note: Constant means the pain never goes away completely; most serious pain is constant and it progresses)      - If intermittent: \"How long does it last?\" \"Do you have pain now?\"      (Note: Intermittent means the pain goes away completely between bouts)      Comes and goes, a couple of seconds, denies pain now.   6. SEVERITY: \"How bad is the pain?\"  (e.g., Scale 1-10; mild, moderate, or severe)    - MILD (1-3): doesn't interfere with normal activities, abdomen soft and not tender to touch     - MODERATE (4-7): interferes with normal activities or awakens from sleep, abdomen tender to touch     - SEVERE (8-10): excruciating pain, doubled over, unable to do any normal activities       2/10  7. RECURRENT SYMPTOM: \"Have you ever had this type of stomach pain before?\" If Yes, ask: \"When was the last time?\" and \"What happened that time?\"       New   8. CAUSE: \"What do you think is causing the stomach pain?\"      unsure  9. RELIEVING/AGGRAVATING FACTORS: \"What " "makes it better or worse?\" (e.g., movement, antacids, bowel movement)      advil 800mg every other day for lower back pain  10. OTHER SYMPTOMS: \"Do you have any other symptoms?\" (e.g., back pain, diarrhea, fever, urination pain, vomiting)        Constipation this morning diarrhea now; typical bowel habit for pt.      Reason for Disposition    Patient wants to be seen    Additional Information    Negative: Passed out (i.e., fainted, collapsed and was not responding)    Negative: Shock suspected (e.g., cold/pale/clammy skin, too weak to stand, low BP, rapid pulse)    Negative: Sounds like a life-threatening emergency to the triager    Negative: Chest pain    Negative: Pain is mainly in upper abdomen (if needed ask: 'is it mainly above the belly button?')    Negative: Abdominal pain and pregnant < 20 weeks    Negative: Abdominal pain and pregnant 20 or more weeks    Negative: SEVERE abdominal pain (e.g., excruciating)    Negative: Vomiting red blood or black (coffee ground) material    Negative: Bloody, black, or tarry bowel movements  (Exception: Chronic-unchanged black-grey bowel movements and is taking iron pills or Pepto-Bismol.)    Negative: Constant abdominal pain lasting > 2 hours    Negative: Vomiting bile (green color)    Negative: Patient sounds very sick or weak to the triager    Negative: Vomiting and abdomen looks much more swollen than usual    Negative: White of the eyes have turned yellow (i.e., jaundice)    Negative: Blood in urine (red, pink, or tea-colored)    Negative: Fever > 103 F (39.4 C)    Negative: Fever > 101 F (38.3 C) and over 60 years of age    Negative: Fever > 100.0 F (37.8 C) and has diabetes mellitus or a weak immune system (e.g., HIV positive, cancer chemotherapy, organ transplant, splenectomy, chronic steroids)    Negative: Fever > 100.0 F (37.8 C) and bedridden (e.g., nursing home patient, stroke, chronic illness, recovering from surgery)    Negative: Pregnant or could be pregnant " (i.e., missed last menstrual period)    Negative: Age > 60 years    Negative: Unusual vaginal discharge    Negative: MODERATE pain (e.g., interferes with normal activities that comes and goes (cramps) lasts > 24 hours  (Exception: Pain with Vomiting or Diarrhea - see that Protocol.)    Protocols used: ABDOMINAL PAIN - FEMALE-A-OH

## 2023-02-15 NOTE — TELEPHONE ENCOUNTER
Gastro provider ordered MRI in December    Per GI notes, recommendation following MRI was endoscopic US for findings (slightly enlarged cystic lesion identified), but GI provider had advised ok to wait until April     Called patient to triage - is she having new/differing symptoms?     Patient Contact    Attempt # 1    Was call answered?  No.  Left message on voicemail with information to call back.    Allison RODRIGUEZ Triage RN  [facility]               Current stomach issue  2/14/2023 9:44 AM Reply    To: JUAN[facility] TRIAGE IM      From: Neli Gregorio      Created: 2/14/2023 9:44 AM        *-*-*This message has not been handled.*-*-*    Hi Dr. Goodson,  I did a follow up MRI as Dr. Pacheco requested the end of December for the cyst on my pancreas.   Please see his notes after that MRI.  I'm not sure any of this is related, but I have low grade sporadic back pain on my left side and occasional odd sporadic pains under my left breast near ribcage as well as in my lower left abdomen, sometimes near my waist area.   They come and go, nothing major, but annoying.  The MRI I had was of this entire area.  Seems like nothing major was noted although he did suggest they pull fluid from the cyst to be sure, he thinks it's nothing though.  I'm not wild about that idea.  Not sure, however, what to think about these mild occasional pains.   I also had a CT scan of that area in the summer of 2022, that's why I agreed to the follow up MRI in December.  Please advise, thank you!

## 2023-02-16 ENCOUNTER — TELEPHONE (OUTPATIENT)
Dept: GASTROENTEROLOGY | Facility: CLINIC | Age: 68
End: 2023-02-16

## 2023-02-16 ENCOUNTER — OFFICE VISIT (OUTPATIENT)
Dept: FAMILY MEDICINE | Facility: CLINIC | Age: 68
End: 2023-02-16
Payer: COMMERCIAL

## 2023-02-16 VITALS
WEIGHT: 144 LBS | BODY MASS INDEX: 23.14 KG/M2 | TEMPERATURE: 97.3 F | RESPIRATION RATE: 14 BRPM | HEIGHT: 66 IN | SYSTOLIC BLOOD PRESSURE: 137 MMHG | HEART RATE: 63 BPM | OXYGEN SATURATION: 100 % | DIASTOLIC BLOOD PRESSURE: 82 MMHG

## 2023-02-16 DIAGNOSIS — K58.2 IRRITABLE BOWEL SYNDROME WITH BOTH CONSTIPATION AND DIARRHEA: ICD-10-CM

## 2023-02-16 DIAGNOSIS — E78.00 HYPERCHOLESTEROLEMIA: ICD-10-CM

## 2023-02-16 DIAGNOSIS — I10 ESSENTIAL HYPERTENSION: ICD-10-CM

## 2023-02-16 DIAGNOSIS — K86.2 PANCREATIC CYST: Primary | ICD-10-CM

## 2023-02-16 PROCEDURE — 99214 OFFICE O/P EST MOD 30 MIN: CPT | Performed by: INTERNAL MEDICINE

## 2023-02-16 ASSESSMENT — PAIN SCALES - GENERAL: PAINLEVEL: MILD PAIN (3)

## 2023-02-16 NOTE — TELEPHONE ENCOUNTER
Called patient to discuss EUS in April with Dr. Huggins after their return from [place].     Pt declines EUS at this time, wants to discuss at clinic.Clinic scheduled 5/18 at 9am    ML

## 2023-02-16 NOTE — TELEPHONE ENCOUNTER
See other triage encounter, pt called back - has OV now     Appointments in Next Year    Feb 16, 2023  9:30 AM  (Arrive by 9:10 AM)  Provider Visit with Berna Goodson MD  [facility] ([facility] ) [phone]   Mar 23, 2023  7:30 AM  PHYSICAL with LETICIA Melendez CNP  [facility] ([facility] ) [phone]   Mar 23, 2023  8:00 AM  (Arrive by 7:45 AM)  MA SCREENING DIGITAL BILATERAL with WEMA1  [facility] ([facility] ) [phone]

## 2023-02-16 NOTE — TELEPHONE ENCOUNTER
[facility] Call Center    Phone Message    May a detailed message be left on voicemail: yes     Reason for Call: Other:      Hamzah Ortiz, pt's PCP. is requesting the clinic call the pt to discuss scheduling a follow up appt. In regards to their pancreatic cyst      Action Taken: Message routed to:  [facility] Panc/Bili    Travel Screening: Not Applicable

## 2023-03-22 NOTE — PROGRESS NOTES
Neli is a 67 year old  female who presents for annual exam.     Besides routine health maintenance, she has no other health concerns today.    Do you have a Health Care Directive?: No, advance care planning information given to patient to review.  Patient plans to discuss their wishes with loved ones or provider.      Fall risk:   Fallen 2 or more times in the past year?: No  Any fall with injury in the past year?: No    HPI:   The patient's PCP is Dr. Berna Goodson MD.  She presents today for her annual exam. Denies vaginal bleeding or abnormal discharge. No hot flashes or night sweats. Occasional vaginal dryness. She is sexually active and does not endorse any pain with intercourse. Not currently using a sexual lubricant. She uses a steroid cream prescribed initially by derm for sporadic dry/red spots on her skin. These areas have not changed. She is due for her pap today. Mammogram is scheduled. She requests fasting lab work today. Denies concerns.    GYNECOLOGIC HISTORY:  No LMP recorded (lmp unknown). Patient is postmenopausal.   reports that she quit smoking about 38 years ago. Her smoking use included cigarettes. She started smoking about 48 years ago. She has a 10.00 pack-year smoking history. She has never used smokeless tobacco.    Patient is sexually active.  STD testing offered?  Declined     Last PHQ-9 score on record=   PHQ-9 SCORE 3/23/2023   PHQ-9 Total Score 0     Last GAD7 score on record=   GRIFFIN-7 SCORE 3/18/2021 3/22/2022 3/23/2023   Total Score 0 0 0     Alcohol Score = 4    HEALTH MAINTENANCE:  Cholesterol:   Recent Labs   Lab Test 22  0710 22  1043   CHOL 231* 236*   HDL 72 78   * 143*   TRIG 73 74     TSH   Date Value Ref Range Status   2018 2.75 0.40 - 4.00 mU/L Final     Last Mammo: One year ago, Result: Normal, Next Mammo: 3/31  Pap:   Lab Results   Component Value Date    PAP NIL NEG-HPV 2019      DEXA:  13  Colonoscopy:  18, Result:   Normal, Next Colonoscopy:     Health maintenance updated:  yes    HISTORY:  OB History    Para Term  AB Living   2 1 1 0 1 1   SAB IAB Ectopic Multiple Live Births   0 1 0 0 1      # Outcome Date GA Lbr Delonte/2nd Weight Sex Delivery Anes PTL Lv   2 Term 85 39w0d  3.345 kg (7 lb 6 oz) M Vag-Vacuum   MAX   1 IAB              Patient Active Problem List   Diagnosis     Hyperlipidemia with target LDL less than 100     Impingement syndrome of shoulder region, right     Hemorrhoids, unspecified hemorrhoid type     Encounter for screening for malignant neoplasm of colon     Past Surgical History:   Procedure Laterality Date     COLONOSCOPY       COLONOSCOPY N/A 2018    Procedure: INTRAOPERATIVE COLONOSCOPY;  Surgeon: Mary Grigsby MD;  Location:  OR     EYE SURGERY      lasik     HEMORRHOIDECTOMY INTERNAL N/A 2018    Procedure: HEMORRHOIDECTOMY INTERNAL;  Surgeon: Mary Grigsby MD;  Location:  OR     MAMMOPLASTY AUGMENTATION      Breast lift     ORTHOPEDIC SURGERY  2018    left shoulder repair      Social History     Tobacco Use     Smoking status: Former     Packs/day: 1.00     Years: 10.00     Pack years: 10.00     Types: Cigarettes     Start date: 1975     Quit date: 1985     Years since quittin.2     Smokeless tobacco: Never   Substance Use Topics     Alcohol use: Yes     Alcohol/week: 0.0 standard drinks     Comment: 1/day      Problem (# of Occurrences) Relation (Name,Age of Onset)    Alzheimer Disease (1) Mother (Leticia)    Heart Disease (1) Father (Timmy)    Hypertension (1) Father (Timmy)    Osteoporosis (2) Other (Catherine): aunt, Maternal Grandmother (Catherine)    Hyperlipidemia (2) Father (Timmy), Mother (Leticia)            Current Outpatient Medications   Medication Sig     amLODIPine (NORVASC) 2.5 MG tablet Take 1 tablet (2.5 mg) by mouth daily     atorvastatin (LIPITOR) 40 MG tablet Take 1 tablet (40 mg) by mouth daily     betamethasone  "dipropionate (DIPROSONE) 0.05 % external cream Apply topically 2 times daily     BIOTIN PO Take 5,000 mcg by mouth daily     Cholecalciferol (VITAMIN D3 PO) Take 4,000 Units by mouth daily      desonide (DESOWEN) 0.05 % external cream Apply sparingly to perineal area two times daily for 14 days.     EPINEPHrine (ANY BX GENERIC EQUIV) 0.3 MG/0.3ML injection 2-pack Inject 0.3 mLs (0.3 mg) into the muscle once as needed for anaphylaxis     zinc gluconate 50 MG tablet Take 50 mg by mouth daily     No current facility-administered medications for this visit.       Allergies   Allergen Reactions     Bee Venom Anaphylaxis     Cats Other (See Comments)     scratchy throat         Past medical, surgical, social and family history were reviewed and updated in EPIC.    ROS:   12 point review of systems negative other than symptoms noted below or in the HPI.  No urinary frequency or dysuria, bladder or kidney problems    EXAM:  /60   Ht 1.645 m (5' 4.75\")   Wt 65.8 kg (145 lb)   LMP  (LMP Unknown)   BMI 24.32 kg/m     BMI: Body mass index is 24.32 kg/m .    EXAM:  Constitutional: Appearance: Well nourished, well developed alert, in no acute distress    Breasts: Inspection of Breasts:  No lymphadenopathy present., Palpation of Breasts and Axillae:  No masses present on palpation, no breast tenderness., Axillary Lymph Nodes:  No lymphadenopathy present. and No nodularity, asymmetry or nipple discharge bilaterally. Dense tissue throughout.      Pelvic Exam:  External Genitalia:     Normal appearance for age, no discharge present, no tenderness present, no inflammatory lesions present, color normal  Vagina:     Normal vaginal vault without central or paravaginal defects, no discharge present, no inflammatory lesions present, no masses present  Bladder:     Nontender to palpation  Urethra:   Urethral Body:  Urethra palpation normal, urethra structural support normal   Urethral Meatus:  No erythema or lesions " present  Cervix:     Appearance healthy, no lesions present, nontender to palpation, no bleeding present  Uterus:     Uterus: firm, normal sized and nontender, anteverted in position.   Adnexa:     No adnexal tenderness present, no adnexal masses present  Perineum:     Perineum within normal limits, no evidence of trauma, no rashes or skin lesions present  Anus:     Anus within normal limits, no hemorrhoids present  Inguinal Lymph Nodes:     No lymphadenopathy present  Pubic Hair:     Normal pubic hair distribution for age  Genitalia and Groin:     No rashes present, no lesions present, no areas of discoloration, no masses present      COUNSELING:   Reviewed preventive health counseling, as reflected in patient instructions       Immunizations          BMI:  Body mass index is 24.32 kg/m .     reports that she quit smoking about 38 years ago. Her smoking use included cigarettes. She started smoking about 48 years ago. She has a 10.00 pack-year smoking history. She has never used smokeless tobacco.      ASSESSMENT:  67 year old female with satisfactory annual exam.    ICD-10-CM    1. Encounter for gynecological examination without abnormal finding  Z01.419 Pap screen with HPV - recommended age 30 - 65 years      2. Hypercholesterolemia  E78.00 atorvastatin (LIPITOR) 40 MG tablet     Lipid panel reflex to direct LDL Fasting     Lipid panel reflex to direct LDL Fasting      3. Screening for metabolic disorder  Z13.228 Comprehensive metabolic panel     Comprehensive metabolic panel      4. Screening for thyroid disorder  Z13.29 TSH with free T4 reflex     TSH with free T4 reflex      5. Screening for HIV (human immunodeficiency virus)  Z11.4 HIV Antigen Antibody Combo     HIV Antigen Antibody Combo      6. Need for hepatitis C screening test  Z11.59 Hepatitis C Screen Reflex to HCV RNA Quant and Genotype     Hepatitis C Screen Reflex to HCV RNA Quant and Genotype      7. Rash  R21 betamethasone dipropionate (DIPROSONE)  0.05 % external cream          PLAN:  Pap and labwork today. Will follow up with results.  Refilled lipitor and steroid cream.   Follow up as needed or in one year for annual exam.    I, Kalyn Bagley, completed the PFSH and ROS. I then acted as a scribe for Arlen KELLY CNP for the remainder of the visit.  Kalyn Bagley BSN, RN  [facility] DNP, [employer]NP student    I was present with the student who participated in the service and in the documentation of the note. I have verified the history and personally performed the physical exam and medical decision-making. I agree with the assessment and plan of care as documented in the note.      LETICIA Melendez CNP

## 2023-03-23 ENCOUNTER — OFFICE VISIT (OUTPATIENT)
Dept: OBGYN | Facility: CLINIC | Age: 68
End: 2023-03-23
Payer: COMMERCIAL

## 2023-03-23 VITALS
BODY MASS INDEX: 24.16 KG/M2 | SYSTOLIC BLOOD PRESSURE: 104 MMHG | DIASTOLIC BLOOD PRESSURE: 60 MMHG | WEIGHT: 145 LBS | HEIGHT: 65 IN

## 2023-03-23 DIAGNOSIS — Z01.419 ENCOUNTER FOR GYNECOLOGICAL EXAMINATION WITHOUT ABNORMAL FINDING: Primary | ICD-10-CM

## 2023-03-23 DIAGNOSIS — Z13.228 SCREENING FOR METABOLIC DISORDER: ICD-10-CM

## 2023-03-23 DIAGNOSIS — Z11.59 NEED FOR HEPATITIS C SCREENING TEST: ICD-10-CM

## 2023-03-23 DIAGNOSIS — E78.00 HYPERCHOLESTEROLEMIA: ICD-10-CM

## 2023-03-23 DIAGNOSIS — Z11.4 SCREENING FOR HIV (HUMAN IMMUNODEFICIENCY VIRUS): ICD-10-CM

## 2023-03-23 DIAGNOSIS — R21 RASH: ICD-10-CM

## 2023-03-23 DIAGNOSIS — Z13.29 SCREENING FOR THYROID DISORDER: ICD-10-CM

## 2023-03-23 LAB
ALBUMIN SERPL BCG-MCNC: 4.5 G/DL (ref 3.5–5.2)
ALP SERPL-CCNC: 53 U/L (ref 35–104)
ALT SERPL W P-5'-P-CCNC: 19 U/L (ref 10–35)
ANION GAP SERPL CALCULATED.3IONS-SCNC: 11 MMOL/L (ref 7–15)
AST SERPL W P-5'-P-CCNC: 24 U/L (ref 10–35)
BILIRUB SERPL-MCNC: 0.5 MG/DL
BUN SERPL-MCNC: 9 MG/DL (ref 8–23)
CALCIUM SERPL-MCNC: 9.3 MG/DL (ref 8.8–10.2)
CHLORIDE SERPL-SCNC: 102 MMOL/L (ref 98–107)
CHOLEST SERPL-MCNC: 198 MG/DL
CREAT SERPL-MCNC: 0.69 MG/DL (ref 0.51–0.95)
DEPRECATED HCO3 PLAS-SCNC: 28 MMOL/L (ref 22–29)
GFR SERPL CREATININE-BSD FRML MDRD: >90 ML/MIN/1.73M2
GLUCOSE SERPL-MCNC: 78 MG/DL (ref 70–99)
HCV AB SERPL QL IA: NONREACTIVE
HDLC SERPL-MCNC: 73 MG/DL
HIV 1+2 AB+HIV1 P24 AG SERPL QL IA: NONREACTIVE
LDLC SERPL CALC-MCNC: 112 MG/DL
NONHDLC SERPL-MCNC: 125 MG/DL
POTASSIUM SERPL-SCNC: 3.7 MMOL/L (ref 3.4–5.3)
PROT SERPL-MCNC: 6.6 G/DL (ref 6.4–8.3)
SODIUM SERPL-SCNC: 141 MMOL/L (ref 136–145)
TRIGL SERPL-MCNC: 63 MG/DL
TSH SERPL DL<=0.005 MIU/L-ACNC: 2.2 UIU/ML (ref 0.3–4.2)

## 2023-03-23 PROCEDURE — 99397 PER PM REEVAL EST PAT 65+ YR: CPT | Performed by: NURSE PRACTITIONER

## 2023-03-23 PROCEDURE — 87624 HPV HI-RISK TYP POOLED RSLT: CPT | Performed by: NURSE PRACTITIONER

## 2023-03-23 PROCEDURE — 80053 COMPREHEN METABOLIC PANEL: CPT | Performed by: NURSE PRACTITIONER

## 2023-03-23 PROCEDURE — 86803 HEPATITIS C AB TEST: CPT | Performed by: NURSE PRACTITIONER

## 2023-03-23 PROCEDURE — G0145 SCR C/V CYTO,THINLAYER,RESCR: HCPCS | Performed by: NURSE PRACTITIONER

## 2023-03-23 PROCEDURE — 36415 COLL VENOUS BLD VENIPUNCTURE: CPT | Performed by: NURSE PRACTITIONER

## 2023-03-23 PROCEDURE — 80061 LIPID PANEL: CPT | Performed by: NURSE PRACTITIONER

## 2023-03-23 PROCEDURE — 87389 HIV-1 AG W/HIV-1&-2 AB AG IA: CPT | Performed by: NURSE PRACTITIONER

## 2023-03-23 PROCEDURE — 84443 ASSAY THYROID STIM HORMONE: CPT | Performed by: NURSE PRACTITIONER

## 2023-03-23 RX ORDER — ATORVASTATIN CALCIUM 40 MG/1
40 TABLET, FILM COATED ORAL DAILY
Qty: 90 TABLET | Refills: 3 | Status: SHIPPED | OUTPATIENT
Start: 2023-03-23 | End: 2024-05-06

## 2023-03-23 RX ORDER — BETAMETHASONE DIPROPIONATE 0.5 MG/G
CREAM TOPICAL 2 TIMES DAILY
Qty: 45 G | Refills: 3 | Status: SHIPPED | OUTPATIENT
Start: 2023-03-23

## 2023-03-23 RX ORDER — BETAMETHASONE DIPROPIONATE 0.5 MG/G
CREAM TOPICAL 2 TIMES DAILY
COMMUNITY
End: 2023-03-23

## 2023-03-23 ASSESSMENT — ANXIETY QUESTIONNAIRES
3. WORRYING TOO MUCH ABOUT DIFFERENT THINGS: NOT AT ALL
GAD7 TOTAL SCORE: 0
2. NOT BEING ABLE TO STOP OR CONTROL WORRYING: NOT AT ALL
1. FEELING NERVOUS, ANXIOUS, OR ON EDGE: NOT AT ALL
6. BECOMING EASILY ANNOYED OR IRRITABLE: NOT AT ALL
5. BEING SO RESTLESS THAT IT IS HARD TO SIT STILL: NOT AT ALL
GAD7 TOTAL SCORE: 0
7. FEELING AFRAID AS IF SOMETHING AWFUL MIGHT HAPPEN: NOT AT ALL

## 2023-03-23 ASSESSMENT — PATIENT HEALTH QUESTIONNAIRE - PHQ9
SUM OF ALL RESPONSES TO PHQ QUESTIONS 1-9: 0
5. POOR APPETITE OR OVEREATING: NOT AT ALL

## 2023-03-27 LAB
BKR LAB AP GYN ADEQUACY: NORMAL
BKR LAB AP GYN INTERPRETATION: NORMAL
BKR LAB AP HPV REFLEX: NORMAL
BKR LAB AP PREVIOUS ABNORMAL: NORMAL
PATH REPORT.COMMENTS IMP SPEC: NORMAL
PATH REPORT.COMMENTS IMP SPEC: NORMAL
PATH REPORT.RELEVANT HX SPEC: NORMAL

## 2023-03-29 LAB
HUMAN PAPILLOMA VIRUS 16 DNA: NEGATIVE
HUMAN PAPILLOMA VIRUS 18 DNA: NEGATIVE
HUMAN PAPILLOMA VIRUS FINAL DIAGNOSIS: NORMAL
HUMAN PAPILLOMA VIRUS OTHER HR: NEGATIVE

## 2023-03-31 ENCOUNTER — ANCILLARY PROCEDURE (OUTPATIENT)
Dept: MAMMOGRAPHY | Facility: CLINIC | Age: 68
End: 2023-03-31
Attending: NURSE PRACTITIONER
Payer: COMMERCIAL

## 2023-03-31 DIAGNOSIS — Z12.31 VISIT FOR SCREENING MAMMOGRAM: ICD-10-CM

## 2023-03-31 PROCEDURE — 77067 SCR MAMMO BI INCL CAD: CPT | Mod: TC | Performed by: STUDENT IN AN ORGANIZED HEALTH CARE EDUCATION/TRAINING PROGRAM

## 2023-03-31 PROCEDURE — 77063 BREAST TOMOSYNTHESIS BI: CPT | Mod: TC | Performed by: STUDENT IN AN ORGANIZED HEALTH CARE EDUCATION/TRAINING PROGRAM

## 2023-04-02 DIAGNOSIS — I10 PRIMARY HYPERTENSION: ICD-10-CM

## 2023-04-04 RX ORDER — AMLODIPINE BESYLATE 2.5 MG/1
TABLET ORAL
Qty: 90 TABLET | Refills: 2 | Status: SHIPPED | OUTPATIENT
Start: 2023-04-04 | End: 2024-05-23

## 2023-05-11 ENCOUNTER — ALLIED HEALTH/NURSE VISIT (OUTPATIENT)
Dept: FAMILY MEDICINE | Facility: CLINIC | Age: 68
End: 2023-05-11
Payer: COMMERCIAL

## 2023-05-11 DIAGNOSIS — Z23 NEED FOR VACCINATION: Primary | ICD-10-CM

## 2023-05-11 DIAGNOSIS — Z23 NEED FOR SHINGLES VACCINE: ICD-10-CM

## 2023-05-11 PROCEDURE — 90471 IMMUNIZATION ADMIN: CPT

## 2023-05-11 PROCEDURE — 90750 HZV VACC RECOMBINANT IM: CPT

## 2023-05-11 PROCEDURE — 99207 PR NO CHARGE NURSE ONLY: CPT

## 2023-06-28 ENCOUNTER — NURSE TRIAGE (OUTPATIENT)
Dept: FAMILY MEDICINE | Facility: CLINIC | Age: 68
End: 2023-06-28

## 2023-06-28 NOTE — TELEPHONE ENCOUNTER
"Banged head on car door  Front part of head   Dazed   No lump  Not sore to touch  About an hour ago        1. MECHANISM: \"How did the injury happen?\" For falls, ask: \"What height did you fall from?\" and \"What surface did you fall against?\"       Bumped on car door  2. ONSET: \"When did the injury happen?\" (Minutes or hours ago)       Anhour ago  3. NEUROLOGIC SYMPTOMS: \"Was there any loss of consciousness?\" \"Are there any other neurological symptoms?\"       A little dazed, but no dizziness/room spinning  4. MENTAL STATUS: \"Does the person know who they are, who you are, and where they are?\"       A and o x 3   5. LOCATION: \"What part of the head was hit?\"       Front top of head   6. SCALP APPEARANCE: \"What does the scalp look like? Is it bleeding now?\" If Yes, ask: \"Is it difficult to stop?\"       Not bleeding   7. SIZE: For cuts, bruises, or swelling, ask: \"How large is it?\" (e.g., inches or centimeters)       No bleeding, denies swelling   8. PAIN: \"Is there any pain?\" If Yes, ask: \"How bad is it?\"  (e.g., Scale 1-10; or mild, moderate, severe)      Not sore to touch, not painful   9. TETANUS: For any breaks in the skin, ask: \"When was the last tetanus booster?\"        10. OTHER SYMPTOMS: \"Do you have any other symptoms?\" (e.g., neck pain, vomiting)          11. PREGNANCY: \"Is there any chance you are pregnant?\" \"When was your last menstrual period?\"          Reason for Disposition    Minor head injury    Additional Information    Negative: ACUTE NEUROLOGIC SYMPTOM and symptom present now    Negative: Knocked out (unconscious) > 1 minute    Negative: Seizure (convulsion) occurred  (Exception: Prior history of seizures and now alert and without Acute Neurologic Symptoms.)    Negative: Neck pain after dangerous injury (e.g., MVA, diving, trampoline, contact sports, fall > 10 feet or 3 meters)  (Exception: Neck pain began > 1 hour after injury.)    Negative: Major bleeding (actively dripping or spurting) that can't " be stopped    Negative: Penetrating head injury (e.g., knife, gunshot wound, metal object)    Negative: Sounds like a life-threatening emergency to the triager    Negative: Diagnosed with a concussion within last 14 days    Negative: Can't remember what happened (amnesia)    Negative: Vomiting once or more    Negative: Watery or blood-tinged fluid dripping from the nose or ears    Negative: ACUTE NEUROLOGIC SYMPTOM and now fine    Negative: Knocked out (unconscious) < 1 minute and now fine    Negative: Severe headache    Negative: Dangerous injury (e.g., MVA, diving, trampoline, contact sports, fall > 10 feet or 3 meters) or severe blow from hard object (e.g., golf club or baseball bat)    Negative: Large swelling or bruise (> 2 inches or 5 cm)    Negative: Skin is split open or gaping (length > 1/2 inch or 12 mm)    Negative: Bleeding won't stop after 10 minutes of direct pressure (using correct technique)    Negative: One or two 'black eyes' (bruising, purple color of eyelids), and onset within 24 hours of head injury    Negative: Taking Coumadin (warfarin) or other strong blood thinner, or known bleeding disorder (e.g., thrombocytopenia)    Negative: Age over 65 years with an area of head swelling or bruise    Negative: Sounds like a serious injury to the triager    Negative: Patient is confused or is an unreliable provider of information (e.g., dementia, severe intellectual disability, alcohol intoxication)    Negative: No prior tetanus shots (or is not fully vaccinated) and any wound (e.g., cut or scrape)    Negative: HIV positive or severe immunodeficiency (severely weak immune system) and DIRTY cut or scrape    Negative: Patient wants to be seen    Negative: Last tetanus shot > 5 years ago and DIRTY cut or scrape    Negative: Last tetanus shot > 10 years ago and CLEAN cut or scrape    Negative: After 3 days and headache persists    Negative: Suspicious history for the injury    Protocols used: HEAD  INJURY-A-OH

## 2024-02-19 ENCOUNTER — TRANSFERRED RECORDS (OUTPATIENT)
Dept: HEALTH INFORMATION MANAGEMENT | Facility: CLINIC | Age: 69
End: 2024-02-19
Payer: MEDICARE

## 2024-05-02 PROBLEM — K64.9 HEMORRHOIDS, UNSPECIFIED HEMORRHOID TYPE: Status: RESOLVED | Noted: 2018-10-24 | Resolved: 2024-05-02

## 2024-05-02 PROBLEM — Z12.11 ENCOUNTER FOR SCREENING FOR MALIGNANT NEOPLASM OF COLON: Status: RESOLVED | Noted: 2023-02-15 | Resolved: 2024-05-02

## 2024-05-06 ENCOUNTER — ANCILLARY PROCEDURE (OUTPATIENT)
Dept: MAMMOGRAPHY | Facility: CLINIC | Age: 69
End: 2024-05-06
Payer: MEDICARE

## 2024-05-06 ENCOUNTER — OFFICE VISIT (OUTPATIENT)
Dept: OBGYN | Facility: CLINIC | Age: 69
End: 2024-05-06
Payer: MEDICARE

## 2024-05-06 VITALS
WEIGHT: 148.6 LBS | BODY MASS INDEX: 25.37 KG/M2 | SYSTOLIC BLOOD PRESSURE: 106 MMHG | DIASTOLIC BLOOD PRESSURE: 64 MMHG | HEIGHT: 64 IN

## 2024-05-06 DIAGNOSIS — Z12.31 VISIT FOR SCREENING MAMMOGRAM: ICD-10-CM

## 2024-05-06 DIAGNOSIS — E78.00 HYPERCHOLESTEROLEMIA: ICD-10-CM

## 2024-05-06 DIAGNOSIS — Z13.228 SCREENING FOR METABOLIC DISORDER: ICD-10-CM

## 2024-05-06 DIAGNOSIS — Z13.29 SCREENING FOR THYROID DISORDER: ICD-10-CM

## 2024-05-06 DIAGNOSIS — Z01.419 ENCOUNTER FOR GYNECOLOGICAL EXAMINATION WITHOUT ABNORMAL FINDING: Primary | ICD-10-CM

## 2024-05-06 LAB
ALBUMIN SERPL BCG-MCNC: 4.5 G/DL (ref 3.5–5.2)
ALP SERPL-CCNC: 56 U/L (ref 40–150)
ALT SERPL W P-5'-P-CCNC: 20 U/L (ref 0–50)
ANION GAP SERPL CALCULATED.3IONS-SCNC: 11 MMOL/L (ref 7–15)
AST SERPL W P-5'-P-CCNC: 25 U/L (ref 0–45)
BILIRUB SERPL-MCNC: 0.6 MG/DL
BUN SERPL-MCNC: 11.2 MG/DL (ref 8–23)
CALCIUM SERPL-MCNC: 9.6 MG/DL (ref 8.8–10.2)
CHLORIDE SERPL-SCNC: 103 MMOL/L (ref 98–107)
CHOLEST SERPL-MCNC: 205 MG/DL
CREAT SERPL-MCNC: 0.68 MG/DL (ref 0.51–0.95)
DEPRECATED HCO3 PLAS-SCNC: 26 MMOL/L (ref 22–29)
EGFRCR SERPLBLD CKD-EPI 2021: >90 ML/MIN/1.73M2
FASTING STATUS PATIENT QL REPORTED: YES
GLUCOSE SERPL-MCNC: 95 MG/DL (ref 70–99)
HBA1C MFR BLD: 5.2 % (ref 0–5.6)
HDLC SERPL-MCNC: 77 MG/DL
LDLC SERPL CALC-MCNC: 113 MG/DL
NONHDLC SERPL-MCNC: 128 MG/DL
POTASSIUM SERPL-SCNC: 4.2 MMOL/L (ref 3.4–5.3)
PROT SERPL-MCNC: 6.8 G/DL (ref 6.4–8.3)
SODIUM SERPL-SCNC: 140 MMOL/L (ref 135–145)
TRIGL SERPL-MCNC: 77 MG/DL
TSH SERPL DL<=0.005 MIU/L-ACNC: 2.7 UIU/ML (ref 0.3–4.2)

## 2024-05-06 PROCEDURE — G0101 CA SCREEN;PELVIC/BREAST EXAM: HCPCS | Performed by: NURSE PRACTITIONER

## 2024-05-06 PROCEDURE — 83036 HEMOGLOBIN GLYCOSYLATED A1C: CPT | Mod: GZ | Performed by: NURSE PRACTITIONER

## 2024-05-06 PROCEDURE — 80061 LIPID PANEL: CPT | Performed by: NURSE PRACTITIONER

## 2024-05-06 PROCEDURE — 99459 PELVIC EXAMINATION: CPT | Performed by: NURSE PRACTITIONER

## 2024-05-06 PROCEDURE — 77067 SCR MAMMO BI INCL CAD: CPT | Mod: TC | Performed by: RADIOLOGY

## 2024-05-06 PROCEDURE — 36415 COLL VENOUS BLD VENIPUNCTURE: CPT | Performed by: NURSE PRACTITIONER

## 2024-05-06 PROCEDURE — 77063 BREAST TOMOSYNTHESIS BI: CPT | Mod: TC | Performed by: RADIOLOGY

## 2024-05-06 PROCEDURE — 84443 ASSAY THYROID STIM HORMONE: CPT | Performed by: NURSE PRACTITIONER

## 2024-05-06 PROCEDURE — 80053 COMPREHEN METABOLIC PANEL: CPT | Performed by: NURSE PRACTITIONER

## 2024-05-06 PROCEDURE — 99207 PR ANNUAL WELLNESS VISIT, PPS, SUBSEQUENT STAT: CPT | Performed by: NURSE PRACTITIONER

## 2024-05-06 RX ORDER — ATORVASTATIN CALCIUM 40 MG/1
40 TABLET, FILM COATED ORAL DAILY
Qty: 90 TABLET | Refills: 3 | Status: SHIPPED | OUTPATIENT
Start: 2024-05-06 | End: 2024-08-15

## 2024-05-06 ASSESSMENT — PATIENT HEALTH QUESTIONNAIRE - PHQ9
5. POOR APPETITE OR OVEREATING: NOT AT ALL
SUM OF ALL RESPONSES TO PHQ QUESTIONS 1-9: 0

## 2024-05-06 ASSESSMENT — ANXIETY QUESTIONNAIRES
1. FEELING NERVOUS, ANXIOUS, OR ON EDGE: NOT AT ALL
6. BECOMING EASILY ANNOYED OR IRRITABLE: NOT AT ALL
2. NOT BEING ABLE TO STOP OR CONTROL WORRYING: NOT AT ALL
7. FEELING AFRAID AS IF SOMETHING AWFUL MIGHT HAPPEN: NOT AT ALL
5. BEING SO RESTLESS THAT IT IS HARD TO SIT STILL: NOT AT ALL
3. WORRYING TOO MUCH ABOUT DIFFERENT THINGS: NOT AT ALL
GAD7 TOTAL SCORE: 0
IF YOU CHECKED OFF ANY PROBLEMS ON THIS QUESTIONNAIRE, HOW DIFFICULT HAVE THESE PROBLEMS MADE IT FOR YOU TO DO YOUR WORK, TAKE CARE OF THINGS AT HOME, OR GET ALONG WITH OTHER PEOPLE: NOT DIFFICULT AT ALL
GAD7 TOTAL SCORE: 0

## 2024-05-06 NOTE — PROGRESS NOTES
SUBJECTIVE:                                                   Neli Gregorio is a 69 year old female who presents to clinic today for the following health issue(s):  Patient presents with:  Physical        HPI:  Patient here today for her annual GYN exam and mammogram.  She is feeling well with no GYN concerns.  She has her mammogram today.  She is postmenopausal and on no hormone replacement therapy.  She has no vaginal bleeding.  She has questions about starting phentermine as she has hopes to lose 10 pounds.  She exercises on a consistent basis and has a healthy diet.    She is fasting for blood work today.    She needs no further Pap testing    No LMP recorded (lmp unknown). Patient is postmenopausal..     Patient is sexually active, .  Using menopause for contraception.    reports that she quit smoking about 39 years ago. Her smoking use included cigarettes. She started smoking about 49 years ago. She has a 10 pack-year smoking history. She has never used smokeless tobacco.    STD testing offered?  Declined    Health maintenance updated:  yes    Today's PHQ-2 Score:       2024     8:08 AM   PHQ-2 (  [employer])   Q1: Little interest or pleasure in doing things 0   Q2: Feeling down, depressed or hopeless 0   PHQ-2 Score 0     Today's PHQ-9 Score:       2024     8:08 AM   PHQ-9 SCORE   PHQ-9 Total Score 0     Today's GRIFFIN-7 Score:       2024     8:08 AM   GRIFFIN-7 SCORE   Total Score 0       Problem list and histories reviewed & adjusted, as indicated.  Additional history: as documented.    Patient Active Problem List   Diagnosis    Hyperlipidemia with target LDL less than 100    Impingement syndrome of shoulder region, right     Past Surgical History:   Procedure Laterality Date    COLONOSCOPY      COLONOSCOPY N/A 2018    Procedure: INTRAOPERATIVE COLONOSCOPY;  Surgeon: Mary Grigsby MD;  Location:  OR    EYE SURGERY      lasik    HEMORRHOIDECTOMY INTERNAL N/A 2018     Procedure: HEMORRHOIDECTOMY INTERNAL;  Surgeon: Mary Grigsby MD;  Location: [facility] OR    MAMMOPLASTY AUGMENTATION  2007    Breast lift    ORTHOPEDIC SURGERY  2018    left shoulder repair      Social History     Tobacco Use    Smoking status: Former     Current packs/day: 0.00     Average packs/day: 1 pack/day for 10.0 years (10.0 ttl pk-yrs)     Types: Cigarettes     Start date: 1975     Quit date: 1985     Years since quittin.3    Smokeless tobacco: Never   Substance Use Topics    Alcohol use: Yes     Alcohol/week: 0.0 standard drinks of alcohol     Comment: 1/day      Problem (# of Occurrences) Relation (Name,Age of Onset)    Alzheimer Disease (1) Mother (Leticia)    Heart Disease (1) Father (Timmy)    Hypertension (1) Father (Timmy)    Osteoporosis (2) Other (Catherine): aunt, Maternal Grandmother (Catherine)    Hyperlipidemia (2) Father (Timmy), Mother (Leticia)              Current Outpatient Medications   Medication Sig Dispense Refill    amLODIPine (NORVASC) 2.5 MG tablet TAKE ONE TABLET BY MOUTH ONE TIME DAILY 90 tablet 2    atorvastatin (LIPITOR) 40 MG tablet Take 1 tablet (40 mg) by mouth daily 90 tablet 3    betamethasone dipropionate (DIPROSONE) 0.05 % external cream Apply topically 2 times daily 45 g 3    BIOTIN PO Take 5,000 mcg by mouth daily      Cholecalciferol (VITAMIN D3 PO) Take 4,000 Units by mouth daily       zinc gluconate 50 MG tablet Take 50 mg by mouth daily      desonide (DESOWEN) 0.05 % external cream Apply sparingly to perineal area two times daily for 14 days. (Patient not taking: Reported on 2024) 15 g 3    EPINEPHrine (ANY BX GENERIC EQUIV) 0.3 MG/0.3ML injection 2-pack Inject 0.3 mLs (0.3 mg) into the muscle once as needed for anaphylaxis (Patient not taking: Reported on 2024) 2 each 3     No current facility-administered medications for this visit.     Allergies   Allergen Reactions    Bee Venom Anaphylaxis    Cats Other (See Comments)     scratchy throat    "      ROS:  12 point review of systems negative other than symptoms noted below or in the HPI.  No urinary frequency or dysuria, bladder or kidney problems      OBJECTIVE:     /64   Ht 1.631 m (5' 4.2\")   Wt 67.4 kg (148 lb 9.6 oz)   LMP  (LMP Unknown)   BMI 25.35 kg/m    Body mass index is 25.35 kg/m .    Exam:  Constitutional:  Appearance: Well nourished, well developed alert, in no acute distress  Breasts:  Inspection of Breasts:  Symmetric bilaterally.  No puckering.  No skin changes.  Palpation of Breasts and Axillae:  No masses present on palpation, no breast tenderness Axillary Lymph Nodes:  No lymphadenopathy present  Neurologic:  Mental Status:  Oriented X3.  Normal strength and tone, sensory exam grossly normal, mentation intact and speech normal.    Psychiatric:  Mentation appears normal and affect normal/bright.  Pelvic Exam:  External Genitalia:     Normal appearance for age, no discharge present, no tenderness present, no inflammatory lesions present, color normal  Vagina:     Normal vaginal vault without central or paravaginal defects, no discharge present, no inflammatory lesions present, no masses present  Bladder:     Nontender to palpation  Urethra:   Urethral Body:  Urethra palpation normal, urethra structural support normal   Urethral Meatus:  No erythema or lesions present  Cervix:     Appearance healthy, no lesions present, nontender to palpation, no bleeding present  Uterus:     Uterus: firm, normal sized and nontender, anteverted in position.   Adnexa:     No adnexal tenderness present, no adnexal masses present  Perineum:     Perineum within normal limits, no evidence of trauma, no rashes or skin lesions present  Anus:     Anus within normal limits, no hemorrhoids present  Inguinal Lymph Nodes:     No lymphadenopathy present  Pubic Hair:     Normal pubic hair distribution for age  Genitalia and Groin:     No rashes present, no lesions present, no areas of discoloration, no masses " present     In-Clinic Test Results:  Results for orders placed or performed in visit on 05/06/24 (from the past 24 hour(s))   MA Screen Bilateral w/Red    Narrative    BILATERAL FULL FIELD DIGITAL SCREENING MAMMOGRAM WITH TOMOSYNTHESIS    Performed on: 5/6/24    Compared to: 03/31/2023, 03/21/2022, 03/18/2021, and 02/25/2020    Technique:  This study was evaluated with the assistance of Computer-Aided   Detection.  Breast Tomosynthesis was used in interpretation.    Findings: The breasts have scattered areas of fibroglandular density.    There is no radiographic evidence of malignancy.     Impression    IMPRESSION: ACR BI-RADS Category 1: Negative    BREAST CANCER SCREENING RECOMMENDATION: Routine yearly mammography   beginning at age 40 or as discussed with your provider.    The results and recommendations of this examination will be communicated   to the patient.        Sasha Lee MD         ASSESSMENT/PLAN:                                                        ICD-10-CM    1. Encounter for gynecological examination without abnormal finding  Z01.419 TX PELVIC EXAMINATION      2. Hypercholesterolemia  E78.00 atorvastatin (LIPITOR) 40 MG tablet     Lipid Profile      3. Screening for thyroid disorder  Z13.29 TSH with free T4 reflex      4. Screening for metabolic disorder  Z13.228 Comprehensive metabolic panel     Hemoglobin A1c          There are no Patient Instructions on file for this visit.    69-year-old postmenopausal female with a normal GYN exam.  We discussed weight training and diet.  We have dissuaded her from using any sort of weight loss aid.  Lab work be completed today.  She is no further Pap screening.  She is to continue with annual mammograms and her bone scan will be due next year.    LETICIA Melendez CNP  [facility] [place]

## 2024-05-20 DIAGNOSIS — I10 PRIMARY HYPERTENSION: ICD-10-CM

## 2024-05-23 ENCOUNTER — TELEPHONE (OUTPATIENT)
Dept: FAMILY MEDICINE | Facility: CLINIC | Age: 69
End: 2024-05-23

## 2024-05-23 ENCOUNTER — VIRTUAL VISIT (OUTPATIENT)
Dept: FAMILY MEDICINE | Facility: CLINIC | Age: 69
End: 2024-05-23
Payer: MEDICARE

## 2024-05-23 ENCOUNTER — MYC REFILL (OUTPATIENT)
Dept: OBGYN | Facility: CLINIC | Age: 69
End: 2024-05-23
Payer: MEDICARE

## 2024-05-23 DIAGNOSIS — I10 PRIMARY HYPERTENSION: ICD-10-CM

## 2024-05-23 PROCEDURE — 99207 PR NO CHARGE LOS: CPT | Mod: 93 | Performed by: NURSE PRACTITIONER

## 2024-05-23 RX ORDER — AMLODIPINE BESYLATE 2.5 MG/1
2.5 TABLET ORAL DAILY
Qty: 30 TABLET | Refills: 0 | Status: CANCELLED | OUTPATIENT
Start: 2024-05-23

## 2024-05-23 RX ORDER — AMLODIPINE BESYLATE 2.5 MG/1
TABLET ORAL
Qty: 90 TABLET | Refills: 0 | OUTPATIENT
Start: 2024-05-23

## 2024-05-23 RX ORDER — AMLODIPINE BESYLATE 2.5 MG/1
2.5 TABLET ORAL DAILY
Qty: 90 TABLET | Refills: 3 | Status: SHIPPED | OUTPATIENT
Start: 2024-05-23 | End: 2024-08-15

## 2024-05-23 NOTE — PROGRESS NOTES
"Neli is a 69 year old who is being evaluated via a billable video visit.    How would you like to obtain your AVS? MyChart  If the video visit is dropped, the invitation should be resent by: Text to cell phone: [phone]  Will anyone else be joining your video visit? No  {If patient encounters technical issues they should call [phone] :437953}    {PROVIDER CHARTING PREFERENCE:688978}    Subjective   Neli is a 69 year old, presenting for the following health issues:  Refill Request      Video Start Time: {video visit start/end time for provider to select:195465}    History of Present Illness       Reason for visit:  Amlodipine prescription refill    She eats 2-3 servings of fruits and vegetables daily.She consumes 0 sweetened beverage(s) daily.She exercises with enough effort to increase her heart rate 20 to 29 minutes per day.  She exercises with enough effort to increase her heart rate 4 days per week. She is missing 1 dose(s) of medications per week.  She is not taking prescribed medications regularly due to remembering to take.       {SUPERLIST (Optional):084017}  {additonal problems for provider to add (Optional):923349}    {ROS Picklists (Optional):729663}      Objective           Vitals:  No vitals were obtained today due to virtual visit.    Physical Exam   {video visit exam brief selected:405156}    {Diagnostic Test Results (Optional):217576}      Video-Visit Details    Type of service:  Video Visit   Video End Time:{video visit start/end time for provider to select:670492}  Originating Location (pt. Location): {video visit patient location:474481::\"Home\"}  {PROVIDER LOCATION On-site should be selected for visits conducted from your clinic location or adjoining [facility] hospital, academic office, or other nearby [facility] building. Off-site should be selected for all other provider locations, including home:979196}  Distant Location (provider location):  {virtual location provider:419714}  Platform used for " "Video Visit: {Virtual Visit Platforms:525017::\"Lisa\"}  Signed Electronically by: LETICIA Krause CNP  {Email feedback regarding this note to [email or web address]   :328640}  "

## 2024-05-23 NOTE — PROGRESS NOTES
Neli is a 69 year old who is being evaluated via a billable telephone visit.    What phone number would you like to be contacted at? cell  How would you like to obtain your AVS? [place]  Originating Location (pt. Location): Home    Distant Location (provider location):  On-site    Assessment & Plan     Primary hypertension  Refilled for 1 year   - amLODIPine (NORVASC) 2.5 MG tablet; Take 1 tablet (2.5 mg) by mouth daily    Will no charge this visit as she just had an annual with GYN with all the proper workup.       Subjective   Neli is a 69 year old, presenting for the following health issues:  Refill Request    History of Present Illness       Reason for visit:  Amlodipine prescription refill    She eats 2-3 servings of fruits and vegetables daily.She consumes 0 sweetened beverage(s) daily.She exercises with enough effort to increase her heart rate 20 to 29 minutes per day.  She exercises with enough effort to increase her heart rate 4 days per week. She is missing 1 dose(s) of medications per week.  She is not taking prescribed medications regularly due to remembering to take.       She just had an annual with GYN. BP well controlled. No concerns         Review of Systems  Detailed as above         Objective           Vitals:  No vitals were obtained today due to virtual visit.    Physical Exam   General: Alert and no distress //Respiratory: No audible wheeze, cough, or shortness of breath // Psychiatric:  Appropriate affect, tone, and pace of words            Phone call duration: 5 minutes  Signed Electronically by: LETICIA Krause CNP

## 2024-05-23 NOTE — TELEPHONE ENCOUNTER
Medication Question or Refill        What medication are you calling about (include dose and sig)?: AMLOPIPINE 2.5MG, ATORVASTATIN 40 MG     Preferred Pharmacy:[id number] - [place], [place] - [address]  [id number] [address] [facility] [address]  Phone: [phone] Fax: [phone]      Controlled Substance Agreement on file:   CSA -- Patient Level:    CSA: None found at the patient level.       Who prescribed the medication?: PCP    Do you need a refill? Yes    When did you use the medication last? N/A    Patient offered an appointment? No    Do you have any questions or concerns?  No      Could we send this information to you in [employer] or would you prefer to receive a phone call?:   Patient would prefer a phone call   Okay to leave a detailed message?: Yes at Cell number on file:    Telephone Information:   Mobile [phone]

## 2024-07-25 ENCOUNTER — TRANSFERRED RECORDS (OUTPATIENT)
Dept: HEALTH INFORMATION MANAGEMENT | Facility: CLINIC | Age: 69
End: 2024-07-25
Payer: MEDICARE

## 2024-07-30 ENCOUNTER — MYC REFILL (OUTPATIENT)
Dept: OBGYN | Facility: CLINIC | Age: 69
End: 2024-07-30
Payer: MEDICARE

## 2024-07-30 DIAGNOSIS — E78.00 HYPERCHOLESTEROLEMIA: ICD-10-CM

## 2024-07-30 RX ORDER — ATORVASTATIN CALCIUM 40 MG/1
40 TABLET, FILM COATED ORAL DAILY
Qty: 90 TABLET | Refills: 3 | OUTPATIENT
Start: 2024-07-30

## 2024-07-30 NOTE — TELEPHONE ENCOUNTER
Requested Prescriptions   Pending Prescriptions Disp Refills    atorvastatin (LIPITOR) 40 MG tablet 90 tablet 3     Sig: Take 1 tablet (40 mg) by mouth daily       Antihyperlipidemic agents Passed - 7/30/2024 11:42 AM        Passed - LDL on file in the past 12 months        Passed - Medication is active on med list        Passed - Recent (12 mo) or future (90 days) visit within the authorizing provider's specialty     The patient must have completed an in-person or virtual visit within the past 12 months or has a future visit scheduled within the next 90 days with the authorizing provider s specialty.  Urgent care and e-visits do not quality as an office visit for this protocol.          Passed - Patient is age 18 years or older        Passed - No active pregnancy on record        Passed - No positive pregnancy test in past 12 mos           Last Written Prescription Date:  5/6/24  Last Fill Quantity: 90,  # refills: 3   Last office visit: 5/6/2024 ; last virtual visit: Visit date not found with prescribing provider:  Arlen Baird NP   Future Office Visit:  none    Rx denied. Should have refills on file    Sandhya Ledbetter RN on 7/30/2024 at 11:45 AM  [facility] Triage

## 2024-08-15 ENCOUNTER — VIRTUAL VISIT (OUTPATIENT)
Dept: FAMILY MEDICINE | Facility: CLINIC | Age: 69
End: 2024-08-15
Payer: MEDICARE

## 2024-08-15 ENCOUNTER — TRANSFERRED RECORDS (OUTPATIENT)
Dept: HEALTH INFORMATION MANAGEMENT | Facility: CLINIC | Age: 69
End: 2024-08-15

## 2024-08-15 DIAGNOSIS — I10 PRIMARY HYPERTENSION: ICD-10-CM

## 2024-08-15 DIAGNOSIS — E78.00 HYPERCHOLESTEROLEMIA: ICD-10-CM

## 2024-08-15 DIAGNOSIS — F41.8 DEPRESSION WITH ANXIETY: Primary | ICD-10-CM

## 2024-08-15 PROCEDURE — 99443 PR PHYSICIAN TELEPHONE EVALUATION 21-30 MIN: CPT | Mod: 93 | Performed by: INTERNAL MEDICINE

## 2024-08-15 RX ORDER — CHLORHEXIDINE GLUCONATE ORAL RINSE 1.2 MG/ML
SOLUTION DENTAL
COMMUNITY
Start: 2024-08-06

## 2024-08-15 RX ORDER — AMLODIPINE BESYLATE 2.5 MG/1
2.5 TABLET ORAL DAILY
Qty: 90 TABLET | Refills: 3 | Status: SHIPPED | OUTPATIENT
Start: 2024-08-15

## 2024-08-15 RX ORDER — ATORVASTATIN CALCIUM 40 MG/1
40 TABLET, FILM COATED ORAL DAILY
Qty: 90 TABLET | Refills: 3 | Status: SHIPPED | OUTPATIENT
Start: 2024-08-15

## 2024-08-15 RX ORDER — ESCITALOPRAM OXALATE 5 MG/1
5 TABLET ORAL DAILY
Qty: 90 TABLET | Refills: 3 | Status: SHIPPED | OUTPATIENT
Start: 2024-08-15

## 2024-08-15 RX ORDER — AMOXICILLIN 875 MG
TABLET ORAL
COMMUNITY
Start: 2024-08-06

## 2024-08-15 NOTE — PROGRESS NOTES
Neli is a 69 year old who is being evaluated via a billable telephone visit.    What phone number would you like to be contacted at? [phone]    How would you like to obtain your AVS? [place]  Originating Location (pt. Location): Home    Distant Location (provider location):  On-site      Subjective   Neli is a 69 year old, presenting for the following health issues:    History of Present Illness       Reason for visit:  Sleepy, emotional    She eats 2-3 servings of fruits and vegetables daily.She consumes 0 sweetened beverage(s) daily.She exercises with enough effort to increase her heart rate 60 or more minutes per day.  She exercises with enough effort to increase her heart rate 4 days per week.   She is taking medications regularly.       Current Medications:     Current Outpatient Medications   Medication Sig Dispense Refill    amLODIPine (NORVASC) 2.5 MG tablet Take 1 tablet (2.5 mg) by mouth daily 90 tablet 3    amoxicillin (AMOXIL) 875 MG tablet       atorvastatin (LIPITOR) 40 MG tablet Take 1 tablet (40 mg) by mouth daily 90 tablet 3    betamethasone dipropionate (DIPROSONE) 0.05 % external cream Apply topically 2 times daily 45 g 3    BIOTIN PO Take 5,000 mcg by mouth daily      chlorhexidine (PERIDEX) 0.12 % solution       Cholecalciferol (VITAMIN D3 PO) Take 4,000 Units by mouth daily       desonide (DESOWEN) 0.05 % external cream Apply sparingly to perineal area two times daily for 14 days. 15 g 3    EPINEPHrine (ANY BX GENERIC EQUIV) 0.3 MG/0.3ML injection 2-pack Inject 0.3 mLs (0.3 mg) into the muscle once as needed for anaphylaxis 2 each 3    escitalopram (LEXAPRO) 5 MG tablet Take 1 tablet (5 mg) by mouth daily 90 tablet 3    zinc gluconate 50 MG tablet Take 50 mg by mouth daily           Allergies:      Allergies   Allergen Reactions    Bee Venom Anaphylaxis    Cats Other (See Comments)     scratchy throat              Past Medical History:     Past Medical History:   Diagnosis Date     Abdominal pain     Atrophic vaginitis     Decreased libido     trial of testosterone cream. Not interested in IM.    Hypercholesteraemia 2010    Hyperlipidemia     PATIENT STARTED LOVASTATIN     Insomnia, unspecified     Takes 1/4 tab of Ambien 5mg nightly.     Proctitis     Psoriasis     Dx originally by derm, mild disease. TCM prn.         Past Surgical History:     Past Surgical History:   Procedure Laterality Date    COLONOSCOPY      COLONOSCOPY N/A 2018    Procedure: INTRAOPERATIVE COLONOSCOPY;  Surgeon: Mary Grigsby MD;  Location:  OR    EYE SURGERY      lasik    HEMORRHOIDECTOMY INTERNAL N/A 2018    Procedure: HEMORRHOIDECTOMY INTERNAL;  Surgeon: Mary Grigsby MD;  Location:  OR    MAMMOPLASTY AUGMENTATION      Breast lift    ORTHOPEDIC SURGERY  2018    left shoulder repair         Family Medical History:     Family History   Problem Relation Age of Onset    Heart Disease Father     Hypertension Father     Hyperlipidemia Father     Alzheimer Disease Mother     Hyperlipidemia Mother     Osteoporosis Other         aunt    Osteoporosis Maternal Grandmother          Social History:     Social History     Socioeconomic History    Marital status:      Spouse name: Samuel    Number of children: 1    Years of education: Not on file    Highest education level: Not on file   Occupational History    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    Occupation:      Comment: [employer]   Tobacco Use    Smoking status: Former     Current packs/day: 0.00     Average packs/day: 1 pack/day for 10.0 years (10.0 ttl pk-yrs)     Types: Cigarettes     Start date: 1975     Quit date: 1985     Years since quittin.6    Smokeless tobacco: Never   Vaping Use    Vaping status: Never Used   Substance and Sexual Activity    Alcohol use: Yes     Alcohol/week: 0.0 standard drinks of alcohol     Comment: 1/day    Drug use: No    Sexual activity: Yes      Partners: Male     Birth control/protection: Post-menopausal   Other Topics Concern    Parent/sibling w/ CABG, MI or angioplasty before 65F 55M? No   Social History Narrative    Not on file     Social Determinants of Health     Financial Resource Strain: Not on file   Food Insecurity: Not on file   Transportation Needs: Not on file   Physical Activity: Not on file   Stress: Not on file   Social Connections: Unknown (12/19/2023)    [facility] [place]  [facility]    Social Connections     Frequency of Communication with Friends and Family: Not on file   Interpersonal Safety: Low Risk  (8/15/2024)    Interpersonal Safety     Do you feel physically and emotionally safe where you currently live?: Yes     Within the past 12 months, have you been hit, slapped, kicked or otherwise physically hurt by someone?: No     Within the past 12 months, have you been humiliated or emotionally abused in other ways by your partner or ex-partner?: No   Housing Stability: Not on file           Review of System:     Constitutional: Negative for fever or chills  Skin: Negative for rashes  Ears/Nose/Throat: Negative for nasal congestion, sore throat  Respiratory: No shortness of breath, dyspnea on exertion, cough, or hemoptysis  Cardiovascular: Negative for chest pain  Gastrointestinal: Negative for nausea, vomiting  Genitourinary: Negative for dysuria, hematuria  Musculoskeletal: Negative for myalgias  Neurologic: Negative for headaches  Psychiatric: positive for depression, anxiety  Hematologic/Lymphatic/Immunologic: Negative  Endocrine: Negative  Behavioral: Negative for tobacco use       Physical Exam:   LMP  (LMP Unknown)   RESP: no cough present   NEURO: Alert & Oriented x 3.   PSYCH: mentation appears normal, affect normal        Diagnostic Test Results:     Diagnostic Test Results:  Labs reviewed in Epic    ASSESSMENT/PLAN:       Diagnoses and all orders for  this visit:    Depression with anxiety  -    symptoms not well controlled  -  REVIEW OF HEALTH MAINTENANCE PROTOCOL ORDERS  -     escitalopram (LEXAPRO) 5 MG tablet; Take 1 tablet (5 mg) by mouth daily    Primary hypertension  - stable  - medication refilled today for    amLODIPine (NORVASC) 2.5 MG tablet; Take 1 tablet (2.5 mg) by mouth daily    Hypercholesterolemia  - stable  - medication refilled today for      atorvastatin (LIPITOR) 40 MG tablet; Take 1 tablet (40 mg) by mouth daily            Follow Up Plan:     Patient is instructed to return to Internal Medicine clinic for follow-up visit in 1 month.        Berna Goodson MD  Internal Medicine  [facility]      telephone visit duration including chart review medication management: 22 minutes  Signed Electronically by: Berna Goodson MD

## 2024-12-18 ENCOUNTER — TRANSFERRED RECORDS (OUTPATIENT)
Dept: HEALTH INFORMATION MANAGEMENT | Facility: CLINIC | Age: 69
End: 2024-12-18
Payer: MEDICARE

## 2025-03-19 ENCOUNTER — TRANSFERRED RECORDS (OUTPATIENT)
Dept: HEALTH INFORMATION MANAGEMENT | Facility: CLINIC | Age: 70
End: 2025-03-19
Payer: MEDICARE

## 2025-04-03 ENCOUNTER — OFFICE VISIT (OUTPATIENT)
Dept: FAMILY MEDICINE | Facility: CLINIC | Age: 70
End: 2025-04-03
Payer: MEDICARE

## 2025-04-03 VITALS
TEMPERATURE: 98.5 F | BODY MASS INDEX: 24.36 KG/M2 | SYSTOLIC BLOOD PRESSURE: 129 MMHG | WEIGHT: 146.2 LBS | DIASTOLIC BLOOD PRESSURE: 76 MMHG | HEART RATE: 65 BPM | HEIGHT: 65 IN | OXYGEN SATURATION: 97 % | RESPIRATION RATE: 16 BRPM

## 2025-04-03 DIAGNOSIS — M25.511 RIGHT SHOULDER PAIN, UNSPECIFIED CHRONICITY: ICD-10-CM

## 2025-04-03 DIAGNOSIS — Z01.818 PREOP GENERAL PHYSICAL EXAM: Primary | ICD-10-CM

## 2025-04-03 DIAGNOSIS — R19.7 DIARRHEA, UNSPECIFIED TYPE: ICD-10-CM

## 2025-04-03 LAB
ALBUMIN SERPL BCG-MCNC: 4.3 G/DL (ref 3.5–5.2)
ALP SERPL-CCNC: 58 U/L (ref 40–150)
ALT SERPL W P-5'-P-CCNC: 21 U/L (ref 0–50)
ANION GAP SERPL CALCULATED.3IONS-SCNC: 13 MMOL/L (ref 7–15)
AST SERPL W P-5'-P-CCNC: 22 U/L (ref 0–45)
BILIRUB DIRECT SERPL-MCNC: 0.19 MG/DL (ref 0–0.3)
BILIRUB SERPL-MCNC: 0.5 MG/DL
BUN SERPL-MCNC: 9.2 MG/DL (ref 8–23)
CALCIUM SERPL-MCNC: 9.1 MG/DL (ref 8.8–10.4)
CHLORIDE SERPL-SCNC: 101 MMOL/L (ref 98–107)
CREAT SERPL-MCNC: 0.65 MG/DL (ref 0.51–0.95)
EGFRCR SERPLBLD CKD-EPI 2021: >90 ML/MIN/1.73M2
ERYTHROCYTE [DISTWIDTH] IN BLOOD BY AUTOMATED COUNT: 12.6 % (ref 10–15)
GLUCOSE SERPL-MCNC: 90 MG/DL (ref 70–99)
HCO3 SERPL-SCNC: 25 MMOL/L (ref 22–29)
HCT VFR BLD AUTO: 41.5 % (ref 35–47)
HGB BLD-MCNC: 13.5 G/DL (ref 11.7–15.7)
HOLD SPECIMEN: NORMAL
MCH RBC QN AUTO: 31.4 PG (ref 26.5–33)
MCHC RBC AUTO-ENTMCNC: 32.5 G/DL (ref 31.5–36.5)
MCV RBC AUTO: 97 FL (ref 78–100)
PLATELET # BLD AUTO: 165 10E3/UL (ref 150–450)
POTASSIUM SERPL-SCNC: 3.9 MMOL/L (ref 3.4–5.3)
PROT SERPL-MCNC: 6.4 G/DL (ref 6.4–8.3)
RBC # BLD AUTO: 4.3 10E6/UL (ref 3.8–5.2)
SODIUM SERPL-SCNC: 139 MMOL/L (ref 135–145)
WBC # BLD AUTO: 4.6 10E3/UL (ref 4–11)

## 2025-04-03 RX ORDER — IBUPROFEN 200 MG
1 CAPSULE ORAL DAILY
COMMUNITY

## 2025-04-03 ASSESSMENT — PAIN SCALES - GENERAL: PAINLEVEL_OUTOF10: SEVERE PAIN (8)

## 2025-04-03 NOTE — PROGRESS NOTES
Preoperative Evaluation  [facility]  [address], [address]  [facility] [address]  Phone: [phone]  Primary Provider: Berna Goodson MD  Pre-op Performing Provider: LETICIA Krause CNP  Apr 3, 2025             4/3/2025   Surgical Information   What procedure is being done? right shoulder replacement   Facility or Hospital where procedure/surgery will be performed: [facility]   Who is doing the procedure / surgery? dr Walt Hadley   Date of surgery / procedure: 04-   Time of surgery / procedure: unknown   Where do you plan to recover after surgery? at home with family     Fax number for surgical facility: [phone]    Assessment & Plan     The proposed surgical procedure is considered INTERMEDIATE risk.    Preop general physical exam  Ok for surgery. She will reconsider if her diarrhea illness doesn't resolve but I suspect it will before surgery.   - EKG 12-lead complete w/read - Clinics  - Basic Metabolic Panel; Future  - CBC w/ Reflex to Iron Studies; Future  - Basic Metabolic Panel  - CBC w/ Reflex to Iron Studies    Right shoulder pain, unspecified chronicity      Diarrhea, unspecified type  Has rather significant diarrhea for the last few weeks. Suspect infectious. Recommend high dose pepto for treatment. Do stool test at any time. This likely will resolve before surgery. She knows she shouldn't go into surgery if this is still going on.   - Enteric Bacteria and Virus Panel by [facility] Stool; Future  - Hepatic panel (Albumin, ALT, AST, Bili, Alk Phos, TP); Future  - Enteric Bacteria and Virus Panel by [facility] Stool  - Hepatic panel (Albumin, ALT, AST, Bili, Alk Phos, TP)            - No identified additional risk factors other than previously addressed    Preoperative Medication Instructions  Antiplatelet or Anticoagulation Medication Instructions   - We reviewed the medication list and the patient is not on an antiplatelet or anticoagulation  medications.    Additional Medication Instructions  Take all scheduled medications on the day of surgery  Except hold vitamins morning of surgery     Recommendation  Approval given to proceed with proposed procedure, without further diagnostic evaluation.    Layton Quinteros is a 69 year old, presenting for the following:  Pre-Op Exam          4/3/2025    10:25 AM   Additional Questions   Roomed by Brina   Accompanied by none     HPI: going for shoulder replacement. Has had multiple cortisone injections that stopped working       Loose stools for the last few weeks. No fever. Was in [place] recently. No antibiotics. Felt a little sick when it first started. No known sick contacts. Prior to [place] she was very constipated. On a bad day she can have 5 BMs a day. Is taking imodium on occasion which will stop her up for a day or so.           4/3/2025   Pre-Op Questionnaire   Have you ever had a heart attack or stroke? No   Have you ever had surgery on your heart or blood vessels, such as a stent placement, a coronary artery bypass, or surgery on an artery in your head, neck, heart, or legs? No   Do you have chest pain with activity? No   Do you have a history of heart failure? No   Do you currently have a cold, bronchitis or symptoms of other infection? No   Do you have a cough, shortness of breath, or wheezing? No   Do you or anyone in your family have previous history of blood clots? No   Do you or does anyone in your family have a serious bleeding problem such as prolonged bleeding following surgeries or cuts? No   Have you ever had problems with anemia or been told to take iron pills? (!) YES as a teen   Have you had any abnormal blood loss such as black, tarry or bloody stools, or abnormal vaginal bleeding? No   Have you ever had a blood transfusion? No   Are you willing to have a blood transfusion if it is medically needed before, during, or after your surgery? Yes   Have you or any of your relatives ever had problems  with anesthesia? No   Do you have sleep apnea, excessive snoring or daytime drowsiness? No   Do you have any artifical heart valves or other implanted medical devices like a pacemaker, defibrillator, or continuous glucose monitor? No   Do you have artificial joints? No   Are you allergic to latex? No     Health Care Directive  Patient does not have a Health Care Directive:     Preoperative Review of    reviewed - no record of controlled substances prescribed.      Status of Chronic Conditions:  See problem list for active medical problems.  Problems all longstanding and stable, except as noted/documented.  See ROS for pertinent symptoms related to these conditions.    Patient Active Problem List    Diagnosis Date Noted    Impingement syndrome of shoulder region, right 03/21/2018     Priority: Medium    Hyperlipidemia with target LDL less than 100 07/16/2015     Priority: Medium     Diagnosis updated by automated process. Provider to review and confirm.        Past Medical History:   Diagnosis Date    Abdominal pain     Atrophic vaginitis     Decreased libido 2015    trial of testosterone cream. Not interested in IM.    Hypercholesteraemia 2010    Hyperlipidemia     PATIENT STARTED LOVASTATIN 12/09    Insomnia, unspecified     Takes 1/4 tab of Ambien 5mg nightly.     Proctitis     Psoriasis     Dx originally by derm, mild disease. TCM prn.     Past Surgical History:   Procedure Laterality Date    COLONOSCOPY      COLONOSCOPY N/A 12/21/2018    Procedure: INTRAOPERATIVE COLONOSCOPY;  Surgeon: Mary Grigsby MD;  Location:  OR    EYE SURGERY  2004    lasik    HEMORRHOIDECTOMY INTERNAL N/A 12/21/2018    Procedure: HEMORRHOIDECTOMY INTERNAL;  Surgeon: Mary Grigsby MD;  Location:  OR    MAMMOPLASTY AUGMENTATION  2007    Breast lift    ORTHOPEDIC SURGERY  2018    left shoulder repair     Current Outpatient Medications   Medication Sig Dispense Refill    amLODIPine (NORVASC) 2.5 MG tablet Take 1  tablet (2.5 mg) by mouth daily 90 tablet 3    atorvastatin (LIPITOR) 40 MG tablet Take 1 tablet (40 mg) by mouth daily (Patient taking differently: Take 40 mg by mouth daily. Taking 10 mg daily) 90 tablet 3    betamethasone dipropionate (DIPROSONE) 0.05 % external cream Apply topically 2 times daily 45 g 3    BIOTIN PO Take 5,000 mcg by mouth daily      calcium citrate (CITRACAL) 950 (200 Ca) MG tablet Take 1 tablet by mouth daily.      Cholecalciferol (VITAMIN D3 PO) Take 4,000 Units by mouth daily       EPINEPHrine (ANY BX GENERIC EQUIV) 0.3 MG/0.3ML injection 2-pack Inject 0.3 mLs (0.3 mg) into the muscle once as needed for anaphylaxis 2 each 3    escitalopram (LEXAPRO) 5 MG tablet Take 1 tablet (5 mg) by mouth daily 90 tablet 3    zinc gluconate 50 MG tablet Take 50 mg by mouth daily      amoxicillin (AMOXIL) 875 MG tablet  (Patient not taking: Reported on 4/3/2025)      chlorhexidine (PERIDEX) 0.12 % solution  (Patient not taking: Reported on 4/3/2025)      desonide (DESOWEN) 0.05 % external cream Apply sparingly to perineal area two times daily for 14 days. (Patient not taking: Reported on 4/3/2025) 15 g 3       Allergies   Allergen Reactions    Bee Venom Anaphylaxis    Cats Other (See Comments)     scratchy throat          Social History     Tobacco Use    Smoking status: Former     Current packs/day: 0.00     Average packs/day: 1 pack/day for 10.0 years (10.0 ttl pk-yrs)     Types: Cigarettes     Start date: 1975     Quit date: 1985     Years since quittin.2    Smokeless tobacco: Never   Substance Use Topics    Alcohol use: Yes     Alcohol/week: 0.0 standard drinks of alcohol     Comment: 1/day     Family History   Problem Relation Age of Onset    Heart Disease Father     Hypertension Father     Hyperlipidemia Father     Alzheimer Disease Mother     Hyperlipidemia Mother     Osteoporosis Other         aunt    Osteoporosis Maternal Grandmother      History   Drug Use No             Review of  "Systems  Constitutional, neuro, ENT, endocrine, pulmonary, cardiac, gastrointestinal, genitourinary, musculoskeletal, integument and psychiatric systems are negative, except as otherwise noted.    Objective    /76 (BP Location: Left arm, Patient Position: Sitting, Cuff Size: Adult Regular)   Pulse 65   Temp 98.5  F (36.9  C) (Tympanic)   Resp 16   Ht 1.651 m (5' 5\")   Wt 66.3 kg (146 lb 3.2 oz)   LMP  (LMP Unknown)   SpO2 97%   BMI 24.33 kg/m     Estimated body mass index is 24.33 kg/m  as calculated from the following:    Height as of this encounter: 1.651 m (5' 5\").    Weight as of this encounter: 66.3 kg (146 lb 3.2 oz).  Physical Exam  GENERAL: alert and no distress  EYES: Eyes grossly normal to inspection, conjunctivae and sclerae normal  RESP: lungs clear to auscultation - no rales, rhonchi or wheezes  CV: regular rate and rhythm, normal S1 S2, no S3 or S4, no murmur, click or rub, no peripheral edema  MS: no gross musculoskeletal defects noted, no edema  SKIN: no suspicious lesions or rashes  NEURO: Normal strength and tone, mentation intact and speech normal  PSYCH: mentation appears normal, affect normal/bright    Recent Labs   Lab Test 05/06/24  0842      POTASSIUM 4.2   CR 0.68   A1C 5.2        Diagnostics  Recent Results (from the past 24 hours)   CBC with Platelets and Reflex to Iron Studies    Collection Time: 04/03/25 11:24 AM   Result Value Ref Range    WBC Count 4.6 4.0 - 11.0 10e3/uL    RBC Count 4.30 3.80 - 5.20 10e6/uL    Hemoglobin 13.5 11.7 - 15.7 g/dL    Hematocrit 41.5 35.0 - 47.0 %    MCV 97 78 - 100 fL    MCH 31.4 26.5 - 33.0 pg    MCHC 32.5 31.5 - 36.5 g/dL    RDW 12.6 10.0 - 15.0 %    Platelet Count 165 150 - 450 10e3/uL   Extra Green Top (Lithium Heparin) Tube    Collection Time: 04/03/25 11:24 AM   Result Value Ref Range    Hold Specimen JIC       EKG: appears normal, NSR, normal axis, normal intervals, no acute ST/T changes c/w ischemia, no LVH by voltage " criteria    Revised Cardiac Risk Index (RCRI)  The patient has the following serious cardiovascular risks for perioperative complications:   - No serious cardiac risks = 0 points     RCRI Interpretation: 0 points: Class I (very low risk - 0.4% complication rate)         Signed Electronically by: LETICIA Krause CNP  A copy of this evaluation report is provided to the requesting physician.

## 2025-04-03 NOTE — PATIENT INSTRUCTIONS
For diarrhea, take Bismuth subsalicylate (pepto bismol) 30 mL (or 2 tablets) every 30 minutes until the diarrhea resolves or eight doses have been taken.   Maximum of 16 tablets or 240ml in 24 hours.  This high dose can make your stool black.    I recommend saccaromyces boulardii (Florastor) Probiotic. Take 1 pill twice a day for 30 days. You can cut back to once a day if you start getting better.         How to Take Your Medication Before Surgery  Preoperative Medication Instructions   Antiplatelet or Anticoagulation Medication Instructions   - We reviewed the medication list and the patient is not on an antiplatelet or anticoagulation medications.    Additional Medication Instructions  Take all scheduled medications on the day of surgery  Except hold vitamins morning of surgery          Patient Education   Preparing for Your Surgery  For Adults  Getting started  In most cases, a nurse will call to review your health history and instructions. They will give you an arrival time based on your scheduled surgery time. Please be ready to share:  Your doctor's clinic name and phone number  Your medical, surgical, and anesthesia history  A list of allergies and sensitivities  A list of medicines, including herbal treatments and over-the-counter drugs  Whether the patient has a legal guardian (ask how to send us the papers in advance)  Note: You may not receive a call if you were seen at our PAC (Preoperative Assessment Center).  Please tell us if you're pregnant--or if there's any chance you might be pregnant. Some surgeries may injure a fetus (unborn baby), so they require a pregnancy test. Surgeries that are safe for a fetus don't always need a test, and you can choose whether to have one.   Preparing for surgery  Within 10 to 30 days of surgery: Have a pre-op exam (sometimes called an H&P, or History and Physical). This can be done at a clinic or pre-operative center.  If you're having a , you may not need  this exam. Talk to your care team.  At your pre-op exam, talk to your care team about all medicines you take. (This includes CBD oil and any drugs, such as THC, marijuana, and other forms of cannabis.) If you need to stop any medicine before surgery, ask when to start taking it again.  This is for your safety. Many medicines and drugs can make you bleed too much during surgery. Some change how well surgery (anesthesia) drugs work.  Call your insurance company to let them know you're having surgery. (If you don't have insurance, call [phone].)  Call your clinic if there's any change in your health. This includes a scrape or scratch near the surgery site, or any signs of a cold (sore throat, runny nose, cough, rash, fever).  Eating and drinking guidelines  For your safety: Unless your surgeon tells you otherwise, follow the guidelines below.  Eat and drink as normal until 8 hours before you arrive for surgery. After that, no food or milk. You can spit out gum when you arrive.  Drink clear liquids until 2 hours before you arrive. These are liquids you can see through, like water, Gatorade, and Propel Water. They also include plain black coffee and tea (no cream or milk).  No alcohol for 24 hours before you arrive. The night before surgery, stop any drinks that contain THC.  If your care team tells you to take medicine on the morning of surgery, it's okay to take it with a sip of water. No other medicines or drugs are allowed (including CBD oil)--follow your care team's instructions.  If you have questions the day of surgery, call your hospital or surgery center.   Preventing infection  Shower or bathe the night before and the morning of surgery. Follow the instructions your clinic gave you. (If no instructions, use regular soap.)  Don't shave or clip hair near your surgery site. We'll remove the hair if needed.  Don't smoke or vape the morning of surgery. No chewing tobacco for 6 hours before you arrive. A  nicotine patch is okay. You may spit out nicotine gum when you arrive.  For some surgeries, the surgeon will tell you to fully quit smoking and nicotine.  We will make every effort to keep you safe from infection. We will:  Clean our hands often with soap and water (or an alcohol-based hand rub).  Clean the skin at your surgery site with a special soap that kills germs.  Give you a special gown to keep you warm. (Cold raises the risk of infection.)  Wear hair covers, masks, gowns, and gloves during surgery.  Give antibiotic medicine, if prescribed. Not all surgeries need this medicine.  What to bring on the day of surgery  Photo ID and insurance card  Copy of your health care directive, if you have one  Glasses and hearing aids (bring cases)  You can't wear contacts during surgery  Inhaler and eye drops, if you use them (tell us about these when you arrive)  CPAP machine or breathing device, if you use them  A few personal items, if spending the night  If you have . . .  A pacemaker, ICD (cardiac defibrillator), or other implant: Bring the ID card.  An implanted stimulator: Bring the remote control.  A legal guardian: Bring a copy of the certified (court-stamped) guardianship papers.  Please remove any jewelry, including body piercings. Leave jewelry and other valuables at home.  If you're going home the day of surgery  You must have a responsible adult drive you home. They should stay with you overnight as well.  If you don't have someone to stay with you, and you aren't safe to go home alone, we may keep you overnight. Insurance often won't pay for this.  After surgery  If it's hard to control your pain or you need more pain medicine, please call your surgeon's office.  Questions?   If you have any questions for your care team, list them here:    ____________________________________________________________________________________________________________________________________________________________________________________________________________________________________________________________  For informational purposes only. Not to replace the advice of your health care provider. Copyright   2003, 2019 [facility]. All rights reserved. Clinically reviewed by Chava Will MD. [employer] 239074 - REV 08/24.

## 2025-04-15 ENCOUNTER — MYC MEDICAL ADVICE (OUTPATIENT)
Dept: FAMILY MEDICINE | Facility: CLINIC | Age: 70
End: 2025-04-15
Payer: MEDICARE

## 2025-04-17 LAB
ADV 40+41 DNA STL QL NAA+NON-PROBE: NEGATIVE
ASTRO TYP 1-8 RNA STL QL NAA+NON-PROBE: NEGATIVE
C CAYETANENSIS DNA STL QL NAA+NON-PROBE: NEGATIVE
CAMPYLOBACTER DNA SPEC NAA+PROBE: NEGATIVE
CRYPTOSP DNA STL QL NAA+NON-PROBE: NEGATIVE
E COLI O157 DNA STL QL NAA+NON-PROBE: NORMAL
E HISTOLYT DNA STL QL NAA+NON-PROBE: NEGATIVE
EAEC ASTA GENE ISLT QL NAA+PROBE: NEGATIVE
EC STX1+STX2 GENES STL QL NAA+NON-PROBE: NEGATIVE
EPEC EAE GENE STL QL NAA+NON-PROBE: NEGATIVE
ETEC LTA+ST1A+ST1B TOX ST NAA+NON-PROBE: NEGATIVE
G LAMBLIA DNA STL QL NAA+NON-PROBE: NEGATIVE
NOROVIRUS GI+II RNA STL QL NAA+NON-PROBE: NEGATIVE
P SHIGELLOIDES DNA STL QL NAA+NON-PROBE: NEGATIVE
RVA RNA STL QL NAA+NON-PROBE: NEGATIVE
SALMONELLA SP RPOD STL QL NAA+PROBE: NEGATIVE
SAPO I+II+IV+V RNA STL QL NAA+NON-PROBE: NEGATIVE
SHIGELLA SP+EIEC IPAH ST NAA+NON-PROBE: NEGATIVE
V CHOLERAE DNA SPEC QL NAA+PROBE: NEGATIVE
VIBRIO DNA SPEC NAA+PROBE: NEGATIVE
Y ENTEROCOL DNA STL QL NAA+PROBE: NEGATIVE

## 2025-04-18 ENCOUNTER — MYC MEDICAL ADVICE (OUTPATIENT)
Dept: FAMILY MEDICINE | Facility: CLINIC | Age: 70
End: 2025-04-18
Payer: MEDICARE

## 2025-04-18 ENCOUNTER — TELEPHONE (OUTPATIENT)
Dept: FAMILY MEDICINE | Facility: CLINIC | Age: 70
End: 2025-04-18
Payer: MEDICARE

## 2025-04-18 NOTE — TELEPHONE ENCOUNTER
Test Results    Contacts       Contact Date/Time Type Contact Phone/Fax    04/18/2025 11:28 AM CDT Phone (Incoming) Neli Gregorio (Self) [phone] (M)            Who ordered the test:  MARTA Evans    Type of test: Lab- Stool test    Date of test:  4-17-25    Where was the test performed:  Dropped off at [place] [facility]     What are your questions/concerns?:  Please call to discuss results    Could we send this information to you in [employer] or would you prefer to receive a phone call?:   Patient would prefer a phone call   Okay to leave a detailed message?: Yes at Cell number on file:    Telephone Information:   Mobile [phone]

## 2025-04-21 ENCOUNTER — NURSE TRIAGE (OUTPATIENT)
Dept: SCHEDULING | Facility: CLINIC | Age: 70
End: 2025-04-21
Payer: MEDICARE

## 2025-04-21 NOTE — TELEPHONE ENCOUNTER
Patient Returning Call    Reason for call:  Pt would like for Nilesh Evans to call her back. She wants to speak to her to get another medication     Information relayed to patient:  Yes     Patient has additional questions:  No      Could we [facility] this [facility] to you [facility] or would you prefer to receive a phone call?:   Patient would prefer a phone call   Okay to leave a detailed message?: Yes at Cell number on file:    Telephone Information:   Mobile [phone]

## 2025-04-23 NOTE — TELEPHONE ENCOUNTER
"Nurse Triage SBAR    Is this a 2nd Level Triage? YES, LICENSED PRACTITIONER REVIEW IS REQUIRED    Situation: Pt returning call to clinic. Pt reporting chronic diarrhea not resolved with imodium or pepto bismol.    Background:   Off and on for 6-8 weeks. Please see pre op lab 4/17. Pt is worried she might have to cancel surgery if not treated. Incontinent episodes.   Triage completed and dispo given to be seen in office today. Pt denies symptoms of dehydration. Partially agrees to dispo and is requesting OV with PCP tomorrow. Routing request to provider; ok to use same day at 130PM?  Assessment: pt to see provider for follow up regarding continued symptom: frequent diarrhea.     Protocol Recommended Disposition:   See in Office Today    Recommendation: pt to see MD for eval/tx . Continue hydration.     Routed to provider    Does the patient meet one of the following criteria for ADS visit consideration? 16+ years old, with an [facility] PCP     TIP  Providers, please consider if this condition is appropriate for management at one of our Acute and Diagnostic Services sites.     If patient is a good candidate, please use dotphrase <dot>triageresponse and select Refer to ADS to document.       1. DIARRHEA SEVERITY: \"How bad is the diarrhea?\" \"How many more stools have you had in the past 24 hours than normal?\"       4 episodes today. Mon and tues were okay.   2. ONSET: \"When did the diarrhea begin?\"       6-8 weeks  3. STOOL DESCRIPTION:  \"How loose or watery is the diarrhea?\" \"What is the stool color?\" \"Is there any blood or mucous in the stool?\"      Loose and watery, brown   4. VOMITING: \"Are you also vomiting?\" If Yes, ask: \"How many times in the past 24 hours?\"       Denies   5. ABDOMEN PAIN: \"Are you having any abdomen pain?\" If Yes, ask: \"What does it feel like?\" (e.g., crampy, dull, intermittent, constant)       Cramping prior to using bathroom. Resolves after BM.   6. ABDOMEN PAIN SEVERITY: If present, ask: \"How bad " "is the pain?\"  (e.g., Scale 1-10; mild, moderate, or severe)        7. ORAL INTAKE: If vomiting, \"Have you been able to drink liquids?\" \"How much liquids have you had in the past 24 hours?\"      Pt had 40-60 ounces a day   8. HYDRATION: \"Any signs of dehydration?\" (e.g., dry mouth [not just dry lips], too weak to stand, dizziness, new weight loss) \"When did you last urinate?\"      Mouth dry at night. Last urinate: 4/23/2025 3:20PM; clear and yellow  9. EXPOSURE: \"Have you traveled to a foreign country recently?\" \"Have you been exposed to anyone with diarrhea?\" \"Could you have eaten any food that was spoiled?\"      Pt has been to [place] a couple of times. Pt has not been around anyone with this symptom.   10. ANTIBIOTIC USE: \"Are you taking antibiotics now or have you taken antibiotics in the past 2 months?\"        Denies   11. OTHER SYMPTOMS: \"Do you have any other symptoms?\" (e.g., fever, blood in stool)        afibrile  12. PREGNANCY: \"Is there any chance you are pregnant?\" \"When was your last menstrual period?\"        No       Reason for Disposition   MODERATE diarrhea (e.g., 4-6 times / day more than normal) and present > 48 hours (2 days)    Additional Information   Negative: Shock suspected (e.g., cold/pale/clammy skin, too weak to stand, low BP, rapid pulse)   Negative: Difficult to awaken or acting confused (e.g., disoriented, slurred speech)   Negative: Sounds like a life-threatening emergency to the triager   Negative: Vomiting also present and worse than the diarrhea   Negative: Blood in stool and without diarrhea   Negative: Diarrhea begins while taking an antibiotic by mouth (oral antibiotic)   Negative: SEVERE abdominal pain (e.g., excruciating) and present > 1 hour   Negative: SEVERE abdominal pain and age > 60 years   Negative: Bloody, black, or tarry bowel movements  (Exception: Chronic-unchanged black-grey bowel movements and is taking iron pills or Pepto-Bismol.)   Negative: SEVERE diarrhea " (e.g., 7 or more times / day more than normal) and age > 60 years   Negative: Constant abdominal pain lasting > 2 hours   Negative: Drinking very little and dehydration suspected (e.g., no urine > 12 hours, very dry mouth, very lightheaded)   Negative: Patient sounds very sick or weak to the triager   Negative: SEVERE diarrhea (e.g., 7 or more times / day more than normal) and present > 24 hours (1 day)    Protocols used: Diarrhea-A-[place]

## 2025-04-23 NOTE — TELEPHONE ENCOUNTER
Attempt #1: Left Message    Writer left message for patient requesting that they return call to discuss message below:  Triage: Inquire what medication the patient is requesting~

## 2025-04-24 ENCOUNTER — OFFICE VISIT (OUTPATIENT)
Dept: FAMILY MEDICINE | Facility: CLINIC | Age: 70
End: 2025-04-24
Payer: MEDICARE

## 2025-04-24 VITALS
HEIGHT: 65 IN | SYSTOLIC BLOOD PRESSURE: 154 MMHG | HEART RATE: 56 BPM | TEMPERATURE: 97.8 F | OXYGEN SATURATION: 98 % | DIASTOLIC BLOOD PRESSURE: 86 MMHG | RESPIRATION RATE: 16 BRPM | BODY MASS INDEX: 24.66 KG/M2 | WEIGHT: 148 LBS

## 2025-04-24 DIAGNOSIS — I10 PRIMARY HYPERTENSION: ICD-10-CM

## 2025-04-24 DIAGNOSIS — E78.5 HYPERLIPIDEMIA WITH TARGET LDL LESS THAN 100: ICD-10-CM

## 2025-04-24 DIAGNOSIS — M75.41 IMPINGEMENT SYNDROME OF SHOULDER REGION, RIGHT: ICD-10-CM

## 2025-04-24 DIAGNOSIS — R19.7 DIARRHEA OF PRESUMED INFECTIOUS ORIGIN: Primary | ICD-10-CM

## 2025-04-24 DIAGNOSIS — Z12.11 SPECIAL SCREENING FOR MALIGNANT NEOPLASMS, COLON: ICD-10-CM

## 2025-04-24 PROCEDURE — 1126F AMNT PAIN NOTED NONE PRSNT: CPT | Performed by: INTERNAL MEDICINE

## 2025-04-24 PROCEDURE — 3077F SYST BP >= 140 MM HG: CPT | Performed by: INTERNAL MEDICINE

## 2025-04-24 PROCEDURE — 3079F DIAST BP 80-89 MM HG: CPT | Performed by: INTERNAL MEDICINE

## 2025-04-24 PROCEDURE — 99214 OFFICE O/P EST MOD 30 MIN: CPT | Performed by: INTERNAL MEDICINE

## 2025-04-24 PROCEDURE — G2211 COMPLEX E/M VISIT ADD ON: HCPCS | Performed by: INTERNAL MEDICINE

## 2025-04-24 ASSESSMENT — PAIN SCALES - GENERAL: PAINLEVEL_OUTOF10: NO PAIN (0)

## 2025-04-24 NOTE — PROGRESS NOTES
{PROVIDER CHARTING PREFERENCE:590150}    Layton Quinteros is a 69 year old, presenting for the following health issues:  No chief complaint on file.  {(!) Visit Details have not yet been documented.  Please enter Visit Details and then use this list to pull in documentation. (Optional):684885}  History of Present Illness       Reason for visit:  Ongoing diarrhea  Symptom onset:  More than a month  Symptom intensity:  Severe  Symptom progression:  Staying the same  Had these symptoms before:  Yes  Has tried/received treatment for these symptoms:  No She is missing 1 dose(s) of medications per week.        {MA/LPN/RN Pre-Provider Visit Orders- hCG/UA/Strep (Optional):330439}  {SUPERLIST (Optional):937215}  {additonal problems for provider to add (Optional):954254}    {ROS Picklists (Optional):797712}      Objective    LMP  (LMP Unknown)   There is no height or weight on file to calculate BMI.  Physical Exam   {Exam List (Optional):425313}    {Diagnostic Test Results (Optional):289401}        Signed Electronically by: Berna Goodson MD  {Email feedback regarding this note to [email or web address]   :[id number]}   rhonchi or wheezes  CV: regular rate and rhythm, normal S1 S2, no S3 or S4, no murmur, click or rub, no peripheral edema  ABDOMEN: soft, nontender, no hepatosplenomegaly, no masses and bowel sounds normal  MS: no gross musculoskeletal defects noted, no edema  SKIN: no suspicious lesions or rashes  NEURO: Normal strength and tone, mentation intact and speech normal  PSYCH: mentation appears normal, affect normal/bright    Labs reviewed in Epic    Results for orders placed or performed in visit on 04/24/25   C. difficile Toxin B PCR with reflex to C. difficile EIA     Status: Normal    Specimen: Per Rectum; Stool   Result Value Ref Range    C Difficile Toxin B by PCR Negative Negative    Narrative    The [employer] Xpert C. difficile Assay, performed on the [employer], is a qualitative in vitro diagnostic test for rapid detection of toxin B gene sequences from unformed (liquid or soft) stool specimens collected from patients suspected of having Clostridioides difficile infection (CDI). The test utilizes automated real-time polymerase chain reaction (PCR) to detect toxin gene sequences associated with toxin producing C. difficile. The Xpert C. difficile Assay is intended as an aid in the diagnosis of CDI.   Enteric Bacteria and Virus Panel by [employer] Stool     Status: Normal    Specimen: Per Rectum; Stool   Result Value Ref Range    Campylobacter species Negative Negative    Salmonella species Negative Negative    Vibrio species Negative Negative    Vibrio cholerae Negative Negative    Yersinia enterocolitica Negative Negative    Enteropathogenic E. coli (EPEC) Negative Negative, NA    Shiga-like toxin-producing E. coli (STEC) Negative Negative    Shigella/Enteroinvasive E. coli (EIEC) Negative Negative    Cryptosporidium species Negative Negative    Giardia lamblia Negative Negative    Norovirus Gl/Gll Negative Negative    Rotavirus A Negative Negative    Plesiomonas shigelloides Negative Negative     Enteroaggregative E. coli (EAEC) Negative Negative    Enterotoxigenic E. coli (ETEC) Negative Negative    E. coli O157 NA Negative, NA    Cyclospora cayetanensis Negative Negative    Entamoeba histolytica Negative Negative    Adenovirus F40/41 Negative Negative    Astrovirus Negative Negative    Sapovirus Negative Negative    Narrative    Assay performed using the FDA-cleared FilmArray GI Panel from [employer]  A negative result should not rule out infection in patients with a probability for gastrointestinal infection. The assay does not test for all potential infectious agents of diarrheal disease.  Positive results do not distinguish between a viable or replicating organism and the presence of a nonviable organism or nucleic acid, nor do they exclude the possibility of coinfection by organisms not in the panel.  Results are intended to aid in the diagnosis of illness and are meant to be used in conjunction with other clinical findings.  This test has been verified and is performed by the [facility] Laboratory at [facility]. This laboratory is certified under the Clinical Laboratory Improvement Amendments of 1988 (CLIA-88) as qualified to perform high complexity clinical laboratory testing.     The longitudinal plan of care for the diagnosis(es)/condition(s) as documented were addressed during this visit. Due to the added complexity in care, I will continue to support Neli in the subsequent management and with ongoing continuity of care.          Signed Electronically by: Berna Goodson MD

## 2025-04-27 PROCEDURE — 87507 IADNA-DNA/RNA PROBE TQ 12-25: CPT | Mod: GZ | Performed by: INTERNAL MEDICINE

## 2025-04-27 PROCEDURE — 87493 C DIFF AMPLIFIED PROBE: CPT | Performed by: INTERNAL MEDICINE

## 2025-04-28 ENCOUNTER — APPOINTMENT (OUTPATIENT)
Dept: LAB | Facility: CLINIC | Age: 70
End: 2025-04-28
Payer: MEDICARE

## 2025-04-28 LAB

## 2025-04-30 ENCOUNTER — PATIENT OUTREACH (OUTPATIENT)
Dept: CARE COORDINATION | Facility: CLINIC | Age: 70
End: 2025-04-30
Payer: MEDICARE

## 2025-05-01 ENCOUNTER — PATIENT OUTREACH (OUTPATIENT)
Dept: CARE COORDINATION | Facility: CLINIC | Age: 70
End: 2025-05-01
Payer: MEDICARE

## 2025-05-01 ENCOUNTER — TRANSFERRED RECORDS (OUTPATIENT)
Dept: HEALTH INFORMATION MANAGEMENT | Facility: CLINIC | Age: 70
End: 2025-05-01
Payer: MEDICARE

## 2025-05-13 ENCOUNTER — HOSPITAL ENCOUNTER (OUTPATIENT)
Dept: MAMMOGRAPHY | Facility: CLINIC | Age: 70
Discharge: HOME | End: 2025-05-13
Attending: INTERNAL MEDICINE
Payer: MEDICARE

## 2025-05-13 ENCOUNTER — LAB (OUTPATIENT)
Dept: LAB | Facility: CLINIC | Age: 70
End: 2025-05-13
Payer: MEDICARE

## 2025-05-13 DIAGNOSIS — Z12.31 VISIT FOR SCREENING MAMMOGRAM: ICD-10-CM

## 2025-05-13 DIAGNOSIS — Z01.818 PREOP GENERAL PHYSICAL EXAM: ICD-10-CM

## 2025-05-13 DIAGNOSIS — Z13.6 SCREENING FOR CARDIOVASCULAR CONDITION: Primary | ICD-10-CM

## 2025-05-13 DIAGNOSIS — E78.5 HYPERLIPIDEMIA WITH TARGET LDL LESS THAN 100: ICD-10-CM

## 2025-05-13 LAB
ERYTHROCYTE [DISTWIDTH] IN BLOOD BY AUTOMATED COUNT: 12.5 % (ref 10–15)
HCT VFR BLD AUTO: 39.8 % (ref 35–47)
HGB BLD-MCNC: 13.3 G/DL (ref 11.7–15.7)
MCH RBC QN AUTO: 31.4 PG (ref 26.5–33)
MCHC RBC AUTO-ENTMCNC: 33.4 G/DL (ref 31.5–36.5)
MCV RBC AUTO: 94 FL (ref 78–100)
PLATELET # BLD AUTO: 202 10E3/UL (ref 150–450)
RBC # BLD AUTO: 4.23 10E6/UL (ref 3.8–5.2)
WBC # BLD AUTO: 6.4 10E3/UL (ref 4–11)

## 2025-05-13 PROCEDURE — 36415 COLL VENOUS BLD VENIPUNCTURE: CPT

## 2025-05-13 PROCEDURE — 77063 BREAST TOMOSYNTHESIS BI: CPT

## 2025-05-13 PROCEDURE — 80048 BASIC METABOLIC PNL TOTAL CA: CPT

## 2025-05-13 PROCEDURE — 85027 COMPLETE CBC AUTOMATED: CPT

## 2025-05-13 PROCEDURE — 80061 LIPID PANEL: CPT

## 2025-05-14 LAB
ANION GAP SERPL CALCULATED.3IONS-SCNC: 11 MMOL/L (ref 7–15)
BUN SERPL-MCNC: 13.7 MG/DL (ref 8–23)
CALCIUM SERPL-MCNC: 9.5 MG/DL (ref 8.8–10.4)
CHLORIDE SERPL-SCNC: 100 MMOL/L (ref 98–107)
CHOLEST SERPL-MCNC: 192 MG/DL
CREAT SERPL-MCNC: 0.65 MG/DL (ref 0.51–0.95)
EGFRCR SERPLBLD CKD-EPI 2021: >90 ML/MIN/1.73M2
FASTING STATUS PATIENT QL REPORTED: NO
FASTING STATUS PATIENT QL REPORTED: NO
GLUCOSE SERPL-MCNC: 98 MG/DL (ref 70–99)
HCO3 SERPL-SCNC: 26 MMOL/L (ref 22–29)
HDLC SERPL-MCNC: 63 MG/DL
LDLC SERPL CALC-MCNC: 101 MG/DL
NONHDLC SERPL-MCNC: 129 MG/DL
POTASSIUM SERPL-SCNC: 4 MMOL/L (ref 3.4–5.3)
SODIUM SERPL-SCNC: 137 MMOL/L (ref 135–145)
TRIGL SERPL-MCNC: 141 MG/DL

## 2025-05-15 ENCOUNTER — MYC MEDICAL ADVICE (OUTPATIENT)
Dept: FAMILY MEDICINE | Facility: CLINIC | Age: 70
End: 2025-05-15
Payer: MEDICARE

## 2025-05-20 ENCOUNTER — TRANSFERRED RECORDS (OUTPATIENT)
Dept: HEALTH INFORMATION MANAGEMENT | Facility: CLINIC | Age: 70
End: 2025-05-20
Payer: MEDICARE

## 2025-05-21 ENCOUNTER — TRANSFERRED RECORDS (OUTPATIENT)
Dept: HEALTH INFORMATION MANAGEMENT | Facility: CLINIC | Age: 70
End: 2025-05-21

## 2025-05-27 PROBLEM — D12.6 ADENOMATOUS POLYP OF COLON: Status: ACTIVE | Noted: 2025-05-27

## 2025-05-28 ENCOUNTER — TRANSFERRED RECORDS (OUTPATIENT)
Dept: HEALTH INFORMATION MANAGEMENT | Facility: CLINIC | Age: 70
End: 2025-05-28
Payer: MEDICARE

## 2025-05-31 NOTE — TELEPHONE ENCOUNTER
I am not comfortable giving ambien prescriptions.  This med was not intended for any kind of ongoing issues with insomnia.  There are serious concerns about side effects and potential for worsening insomnia due to sleep rebound on this med.     I checked her records and the last time she had ambien it came from family [facility].      I can order trazadone if she likes but I no longer start patients on ambien.    Avoiding any kind of alcohol in evenings is also very helpful in promoting sleep.   -[phone]

## 2025-06-05 ENCOUNTER — TRANSFERRED RECORDS (OUTPATIENT)
Dept: HEALTH INFORMATION MANAGEMENT | Facility: CLINIC | Age: 70
End: 2025-06-05

## 2025-06-14 ENCOUNTER — HEALTH MAINTENANCE LETTER (OUTPATIENT)
Age: 70
End: 2025-06-14

## 2025-06-25 ENCOUNTER — TRANSFERRED RECORDS (OUTPATIENT)
Dept: HEALTH INFORMATION MANAGEMENT | Facility: CLINIC | Age: 70
End: 2025-06-25
Payer: MEDICARE

## 2025-08-12 DIAGNOSIS — F41.8 DEPRESSION WITH ANXIETY: ICD-10-CM

## 2025-08-12 RX ORDER — ESCITALOPRAM OXALATE 5 MG/1
5 TABLET ORAL DAILY
Qty: 90 TABLET | Refills: 0 | Status: SHIPPED | OUTPATIENT
Start: 2025-08-12

## (undated) DEVICE — NDL 19GA 1.5"

## (undated) DEVICE — SYR 10ML FINGER CONTROL W/O NDL 309695

## (undated) DEVICE — DRAPE MINOR PROCEDURE LAP 29496

## (undated) DEVICE — JELLY LUBRICATING SURGILUBE 2OZ TUBE

## (undated) DEVICE — GLOVE PROTEXIS W/NEU-THERA 6.0  2D73TE60

## (undated) DEVICE — SUCTION TIP YANKAUER W/O VENT K86

## (undated) DEVICE — SPONGE BALL KERLIX ROUND XL W/O STRING LATEX 4935

## (undated) DEVICE — PANTIES MESH LG/XLG 2PK 706M2

## (undated) DEVICE — SOL WATER IRRIG 1000ML BOTTLE 2F7114

## (undated) DEVICE — PACK MINOR SBA15MIFSE

## (undated) DEVICE — SYR 10ML SLIP TIP W/O NDL

## (undated) DEVICE — NDL 27GA 1.25" 305136

## (undated) DEVICE — SUCTION CANISTER MEDIVAC LINER 3000ML W/LID 65651-530

## (undated) DEVICE — LINEN TOWEL PACK X5 5464

## (undated) DEVICE — SU CHROMIC 3-0 SH 27" G122H

## (undated) DEVICE — DECANTER VIAL 2006S

## (undated) DEVICE — GLOVE PROTEXIS BLUE W/NEU-THERA 6.5  2D73EB65

## (undated) DEVICE — SOL NACL 0.9% IRRIG 1000ML BOTTLE 07138-09

## (undated) DEVICE — SYR 10ML SLIP TIP W/O NDL 303134

## (undated) DEVICE — SPONGE RAY-TEC 3X3" 30-094

## (undated) DEVICE — NDL 18GA 1.5" 305196

## (undated) DEVICE — ESU ELEC NDL 1" E1552

## (undated) RX ORDER — PROPOFOL 10 MG/ML
INJECTION, EMULSION INTRAVENOUS
Status: DISPENSED
Start: 2018-12-21

## (undated) RX ORDER — LIDOCAINE HYDROCHLORIDE 20 MG/ML
INJECTION, SOLUTION EPIDURAL; INFILTRATION; INTRACAUDAL; PERINEURAL
Status: DISPENSED
Start: 2018-12-21

## (undated) RX ORDER — BUPIVACAINE HYDROCHLORIDE 2.5 MG/ML
INJECTION, SOLUTION EPIDURAL; INFILTRATION; INTRACAUDAL
Status: DISPENSED
Start: 2018-12-21

## (undated) RX ORDER — ACETAMINOPHEN 325 MG/1
TABLET ORAL
Status: DISPENSED
Start: 2018-12-21